# Patient Record
Sex: MALE | Race: WHITE | URBAN - METROPOLITAN AREA
[De-identification: names, ages, dates, MRNs, and addresses within clinical notes are randomized per-mention and may not be internally consistent; named-entity substitution may affect disease eponyms.]

---

## 2017-01-18 ENCOUNTER — TELEPHONE (OUTPATIENT)
Dept: TRANSPLANT | Facility: CLINIC | Age: 57
End: 2017-01-18

## 2017-04-19 ENCOUNTER — TELEPHONE (OUTPATIENT)
Dept: TRANSPLANT | Facility: CLINIC | Age: 57
End: 2017-04-19

## 2017-04-19 NOTE — LETTER
April 19, 2017      Dear Mr. Bailey:      We have made several attempts to get in touch with you but have been unable to reach you to schedule Kidney Evaluation appointments.    If you are still interested and/or have any questions, please contact us at  [phone]   Monday - Friday, between the hours of 7:30-4:00 pm central time.    We look forward to hearing from you.      Regards,     Shirlene Salcedo  KidneyTransplant   [facility]  Solid Organ Transplant  [address] [address]  email: [email or web address]  Office: [phone]  Fax: [phone]

## 2019-09-16 ENCOUNTER — RECORDS - HEALTHEAST (OUTPATIENT)
Dept: SURGERY | Facility: CLINIC | Age: 59
End: 2019-09-16

## 2024-01-01 ENCOUNTER — APPOINTMENT (OUTPATIENT)
Dept: GENERAL RADIOLOGY | Facility: CLINIC | Age: 64
DRG: 003 | End: 2024-01-01
Attending: INTERNAL MEDICINE
Payer: COMMERCIAL

## 2024-01-01 ENCOUNTER — ANCILLARY PROCEDURE (OUTPATIENT)
Dept: CARDIOLOGY | Facility: CLINIC | Age: 64
DRG: 003 | End: 2024-01-01
Attending: STUDENT IN AN ORGANIZED HEALTH CARE EDUCATION/TRAINING PROGRAM
Payer: COMMERCIAL

## 2024-01-01 ENCOUNTER — APPOINTMENT (OUTPATIENT)
Dept: CT IMAGING | Facility: CLINIC | Age: 64
DRG: 003 | End: 2024-01-01
Attending: NURSE PRACTITIONER
Payer: COMMERCIAL

## 2024-01-01 ENCOUNTER — APPOINTMENT (OUTPATIENT)
Dept: NEUROLOGY | Facility: CLINIC | Age: 64
DRG: 003 | End: 2024-01-01
Attending: INTERNAL MEDICINE
Payer: COMMERCIAL

## 2024-01-01 ENCOUNTER — APPOINTMENT (OUTPATIENT)
Dept: CT IMAGING | Facility: CLINIC | Age: 64
DRG: 003 | End: 2024-01-01
Payer: COMMERCIAL

## 2024-01-01 ENCOUNTER — ANESTHESIA (OUTPATIENT)
Dept: CARDIOLOGY | Facility: CLINIC | Age: 64
DRG: 003 | End: 2024-01-01
Payer: COMMERCIAL

## 2024-01-01 ENCOUNTER — APPOINTMENT (OUTPATIENT)
Dept: CT IMAGING | Facility: CLINIC | Age: 64
DRG: 003 | End: 2024-01-01
Attending: STUDENT IN AN ORGANIZED HEALTH CARE EDUCATION/TRAINING PROGRAM
Payer: COMMERCIAL

## 2024-01-01 ENCOUNTER — HOSPITAL ENCOUNTER (INPATIENT)
Facility: CLINIC | Age: 64
LOS: 5 days | DRG: 003 | End: 2024-06-17
Admitting: INTERNAL MEDICINE
Payer: COMMERCIAL

## 2024-01-01 ENCOUNTER — APPOINTMENT (OUTPATIENT)
Dept: ULTRASOUND IMAGING | Facility: CLINIC | Age: 64
DRG: 003 | End: 2024-01-01
Attending: INTERNAL MEDICINE
Payer: COMMERCIAL

## 2024-01-01 ENCOUNTER — APPOINTMENT (OUTPATIENT)
Dept: CARDIOLOGY | Facility: CLINIC | Age: 64
DRG: 003 | End: 2024-01-01
Attending: INTERNAL MEDICINE
Payer: COMMERCIAL

## 2024-01-01 ENCOUNTER — APPOINTMENT (OUTPATIENT)
Dept: ULTRASOUND IMAGING | Facility: CLINIC | Age: 64
DRG: 003 | End: 2024-01-01
Attending: NURSE PRACTITIONER
Payer: COMMERCIAL

## 2024-01-01 ENCOUNTER — ANESTHESIA EVENT (OUTPATIENT)
Dept: CARDIOLOGY | Facility: CLINIC | Age: 64
DRG: 003 | End: 2024-01-01
Payer: COMMERCIAL

## 2024-01-01 VITALS
BODY MASS INDEX: 34.04 KG/M2 | SYSTOLIC BLOOD PRESSURE: 100 MMHG | HEIGHT: 71 IN | RESPIRATION RATE: 10 BRPM | WEIGHT: 243.17 LBS | TEMPERATURE: 98.4 F | OXYGEN SATURATION: 94 % | DIASTOLIC BLOOD PRESSURE: 45 MMHG

## 2024-01-01 DIAGNOSIS — I46.9 CARDIAC ARREST (H): ICD-10-CM

## 2024-01-01 LAB
ABO/RH(D): NORMAL
ACT BLD: 132 SECONDS (ref 74–150)
ACT BLD: 132 SECONDS (ref 74–150)
ACT BLD: 136 SECONDS (ref 74–150)
ACT BLD: 136 SECONDS (ref 74–150)
ACT BLD: 140 SECONDS (ref 74–150)
ACT BLD: 140 SECONDS (ref 74–150)
ACT BLD: 144 SECONDS (ref 74–150)
ACT BLD: 148 SECONDS (ref 74–150)
ACT BLD: 153 SECONDS (ref 74–150)
ACT BLD: 169 SECONDS (ref 74–150)
ACT BLD: 169 SECONDS (ref 74–150)
ACT BLD: 182 SECONDS (ref 74–150)
ACT BLD: 186 SECONDS (ref 74–150)
ACT BLD: 190 SECONDS (ref 74–150)
ACT BLD: 190 SECONDS (ref 74–150)
ACT BLD: 194 SECONDS (ref 74–150)
ACT BLD: 199 SECONDS (ref 74–150)
ACT BLD: 199 SECONDS (ref 74–150)
ACT BLD: 203 SECONDS (ref 74–150)
ACT BLD: 211 SECONDS (ref 74–150)
ACT BLD: 408 SECONDS (ref 74–150)
ACT BLD: 429 SECONDS (ref 74–150)
ACT BLD: >1000 SECONDS (ref 74–150)
ALBUMIN SERPL BCG-MCNC: 1.9 G/DL (ref 3.5–5.2)
ALBUMIN SERPL BCG-MCNC: 2 G/DL (ref 3.5–5.2)
ALBUMIN SERPL BCG-MCNC: 2.1 G/DL (ref 3.5–5.2)
ALBUMIN SERPL BCG-MCNC: 2.2 G/DL (ref 3.5–5.2)
ALLEN'S TEST: ABNORMAL
ALP SERPL-CCNC: 52 U/L (ref 40–150)
ALP SERPL-CCNC: 55 U/L (ref 40–150)
ALP SERPL-CCNC: 56 U/L (ref 40–150)
ALP SERPL-CCNC: 60 U/L (ref 40–150)
ALP SERPL-CCNC: 62 U/L (ref 40–150)
ALP SERPL-CCNC: 62 U/L (ref 40–150)
ALP SERPL-CCNC: 63 U/L (ref 40–150)
ALP SERPL-CCNC: 64 U/L (ref 40–150)
ALP SERPL-CCNC: 65 U/L (ref 40–150)
ALP SERPL-CCNC: 67 U/L (ref 40–150)
ALP SERPL-CCNC: 67 U/L (ref 40–150)
ALP SERPL-CCNC: 68 U/L (ref 40–150)
ALP SERPL-CCNC: 73 U/L (ref 40–150)
ALP SERPL-CCNC: 77 U/L (ref 40–150)
ALP SERPL-CCNC: 86 U/L (ref 40–150)
ALP SERPL-CCNC: 95 U/L (ref 40–150)
ALT SERPL W P-5'-P-CCNC: 16 U/L (ref 0–70)
ALT SERPL W P-5'-P-CCNC: 17 U/L (ref 0–70)
ALT SERPL W P-5'-P-CCNC: 18 U/L (ref 0–70)
ALT SERPL W P-5'-P-CCNC: 19 U/L (ref 0–70)
ALT SERPL W P-5'-P-CCNC: 21 U/L (ref 0–70)
ALT SERPL W P-5'-P-CCNC: 22 U/L (ref 0–70)
ALT SERPL W P-5'-P-CCNC: 25 U/L (ref 0–70)
ALT SERPL W P-5'-P-CCNC: 27 U/L (ref 0–70)
ALT SERPL W P-5'-P-CCNC: 30 U/L (ref 0–70)
ALT SERPL W P-5'-P-CCNC: 34 U/L (ref 0–70)
ALT SERPL W P-5'-P-CCNC: 35 U/L (ref 0–70)
ALT SERPL W P-5'-P-CCNC: 40 U/L (ref 0–70)
ALT SERPL W P-5'-P-CCNC: 40 U/L (ref 0–70)
ALT SERPL W P-5'-P-CCNC: 44 U/L (ref 0–70)
ALT SERPL W P-5'-P-CCNC: 46 U/L (ref 0–70)
ALT SERPL W P-5'-P-CCNC: 48 U/L (ref 0–70)
ALT SERPL W P-5'-P-CCNC: 50 U/L (ref 0–70)
ALT SERPL W P-5'-P-CCNC: 51 U/L (ref 0–70)
ALT SERPL W P-5'-P-CCNC: 52 U/L (ref 0–70)
ANION GAP SERPL CALCULATED.3IONS-SCNC: 10 MMOL/L (ref 7–15)
ANION GAP SERPL CALCULATED.3IONS-SCNC: 11 MMOL/L (ref 7–15)
ANION GAP SERPL CALCULATED.3IONS-SCNC: 12 MMOL/L (ref 7–15)
ANION GAP SERPL CALCULATED.3IONS-SCNC: 13 MMOL/L (ref 7–15)
ANION GAP SERPL CALCULATED.3IONS-SCNC: 16 MMOL/L (ref 7–15)
ANION GAP SERPL CALCULATED.3IONS-SCNC: 19 MMOL/L (ref 7–15)
ANION GAP SERPL CALCULATED.3IONS-SCNC: 20 MMOL/L (ref 7–15)
ANION GAP SERPL CALCULATED.3IONS-SCNC: 20 MMOL/L (ref 7–15)
ANION GAP SERPL CALCULATED.3IONS-SCNC: 21 MMOL/L (ref 7–15)
ANION GAP SERPL CALCULATED.3IONS-SCNC: 21 MMOL/L (ref 7–15)
ANION GAP SERPL CALCULATED.3IONS-SCNC: 7 MMOL/L (ref 7–15)
ANION GAP SERPL CALCULATED.3IONS-SCNC: 8 MMOL/L (ref 7–15)
ANION GAP SERPL CALCULATED.3IONS-SCNC: 9 MMOL/L (ref 7–15)
ANTIBODY SCREEN: NEGATIVE
APTT PPP: 41 SECONDS (ref 22–38)
APTT PPP: 42 SECONDS (ref 22–38)
APTT PPP: 43 SECONDS (ref 22–38)
APTT PPP: 44 SECONDS (ref 22–38)
APTT PPP: 44 SECONDS (ref 22–38)
APTT PPP: 45 SECONDS (ref 22–38)
APTT PPP: 46 SECONDS (ref 22–38)
APTT PPP: 47 SECONDS (ref 22–38)
APTT PPP: 48 SECONDS (ref 22–38)
APTT PPP: 49 SECONDS (ref 22–38)
APTT PPP: 49 SECONDS (ref 22–38)
APTT PPP: 52 SECONDS (ref 22–38)
APTT PPP: 55 SECONDS (ref 22–38)
APTT PPP: 58 SECONDS (ref 22–38)
APTT PPP: 60 SECONDS (ref 22–38)
AST SERPL W P-5'-P-CCNC: 110 U/L (ref 0–45)
AST SERPL W P-5'-P-CCNC: 124 U/L (ref 0–45)
AST SERPL W P-5'-P-CCNC: 130 U/L (ref 0–45)
AST SERPL W P-5'-P-CCNC: 136 U/L (ref 0–45)
AST SERPL W P-5'-P-CCNC: 137 U/L (ref 0–45)
AST SERPL W P-5'-P-CCNC: 156 U/L (ref 0–45)
AST SERPL W P-5'-P-CCNC: 166 U/L (ref 0–45)
AST SERPL W P-5'-P-CCNC: 180 U/L (ref 0–45)
AST SERPL W P-5'-P-CCNC: 187 U/L (ref 0–45)
AST SERPL W P-5'-P-CCNC: 211 U/L (ref 0–45)
AST SERPL W P-5'-P-CCNC: 229 U/L (ref 0–45)
AST SERPL W P-5'-P-CCNC: 244 U/L (ref 0–45)
AST SERPL W P-5'-P-CCNC: 260 U/L (ref 0–45)
AST SERPL W P-5'-P-CCNC: 289 U/L (ref 0–45)
AST SERPL W P-5'-P-CCNC: 350 U/L (ref 0–45)
AST SERPL W P-5'-P-CCNC: 389 U/L (ref 0–45)
AST SERPL W P-5'-P-CCNC: 418 U/L (ref 0–45)
AST SERPL W P-5'-P-CCNC: 437 U/L (ref 0–45)
AST SERPL W P-5'-P-CCNC: 498 U/L (ref 0–45)
AST SERPL W P-5'-P-CCNC: 511 U/L (ref 0–45)
AST SERPL W P-5'-P-CCNC: 512 U/L (ref 0–45)
AT III ACT/NOR PPP CHRO: 37 % (ref 85–135)
AT III ACT/NOR PPP CHRO: 38 % (ref 85–135)
AT III ACT/NOR PPP CHRO: 41 % (ref 85–135)
AT III ACT/NOR PPP CHRO: 41 % (ref 85–135)
AT III ACT/NOR PPP CHRO: 42 % (ref 85–135)
ATRIAL RATE - MUSE: 68 BPM
ATRIAL RATE - MUSE: 74 BPM
ATRIAL RATE - MUSE: 80 BPM
ATRIAL RATE - MUSE: 81 BPM
ATRIAL RATE - MUSE: 88 BPM
ATRIAL RATE - MUSE: NORMAL BPM
BACTERIA SPT CULT: NO GROWTH
BACTERIA SPT CULT: NORMAL
BASE EXCESS BLDA CALC-SCNC: -0.1 MMOL/L (ref -3–3)
BASE EXCESS BLDA CALC-SCNC: -0.2 MMOL/L (ref -3–3)
BASE EXCESS BLDA CALC-SCNC: -0.3 MMOL/L (ref -3–3)
BASE EXCESS BLDA CALC-SCNC: -0.4 MMOL/L (ref -3–3)
BASE EXCESS BLDA CALC-SCNC: -0.4 MMOL/L (ref -3–3)
BASE EXCESS BLDA CALC-SCNC: -0.5 MMOL/L (ref -3–3)
BASE EXCESS BLDA CALC-SCNC: -0.6 MMOL/L (ref -3–3)
BASE EXCESS BLDA CALC-SCNC: -0.9 MMOL/L (ref -3–3)
BASE EXCESS BLDA CALC-SCNC: -1.4 MMOL/L (ref -3–3)
BASE EXCESS BLDA CALC-SCNC: -1.5 MMOL/L (ref -3–3)
BASE EXCESS BLDA CALC-SCNC: -1.6 MMOL/L (ref -3–3)
BASE EXCESS BLDA CALC-SCNC: -1.7 MMOL/L (ref -3–3)
BASE EXCESS BLDA CALC-SCNC: -1.7 MMOL/L (ref -3–3)
BASE EXCESS BLDA CALC-SCNC: -1.8 MMOL/L (ref -3–3)
BASE EXCESS BLDA CALC-SCNC: -1.9 MMOL/L (ref -3–3)
BASE EXCESS BLDA CALC-SCNC: -1.9 MMOL/L (ref -3–3)
BASE EXCESS BLDA CALC-SCNC: -10 MMOL/L (ref -3–3)
BASE EXCESS BLDA CALC-SCNC: -10.1 MMOL/L (ref -3–3)
BASE EXCESS BLDA CALC-SCNC: -10.3 MMOL/L (ref -3–3)
BASE EXCESS BLDA CALC-SCNC: -10.4 MMOL/L (ref -3–3)
BASE EXCESS BLDA CALC-SCNC: -10.4 MMOL/L (ref -3–3)
BASE EXCESS BLDA CALC-SCNC: -10.6 MMOL/L (ref -3–3)
BASE EXCESS BLDA CALC-SCNC: -10.8 MMOL/L (ref -3–3)
BASE EXCESS BLDA CALC-SCNC: -10.8 MMOL/L (ref -3–3)
BASE EXCESS BLDA CALC-SCNC: -11.2 MMOL/L (ref -3–3)
BASE EXCESS BLDA CALC-SCNC: -11.5 MMOL/L (ref -3–3)
BASE EXCESS BLDA CALC-SCNC: -12.8 MMOL/L (ref -3–3)
BASE EXCESS BLDA CALC-SCNC: -19.2 MMOL/L (ref -3–3)
BASE EXCESS BLDA CALC-SCNC: -2.1 MMOL/L (ref -3–3)
BASE EXCESS BLDA CALC-SCNC: -2.3 MMOL/L (ref -3–3)
BASE EXCESS BLDA CALC-SCNC: -2.3 MMOL/L (ref -3–3)
BASE EXCESS BLDA CALC-SCNC: -2.5 MMOL/L (ref -3–3)
BASE EXCESS BLDA CALC-SCNC: -2.6 MMOL/L (ref -3–3)
BASE EXCESS BLDA CALC-SCNC: -2.6 MMOL/L (ref -3–3)
BASE EXCESS BLDA CALC-SCNC: -3.2 MMOL/L (ref -3–3)
BASE EXCESS BLDA CALC-SCNC: -3.3 MMOL/L (ref -3–3)
BASE EXCESS BLDA CALC-SCNC: -3.3 MMOL/L (ref -3–3)
BASE EXCESS BLDA CALC-SCNC: -4 MMOL/L (ref -3–3)
BASE EXCESS BLDA CALC-SCNC: -5 MMOL/L (ref -3–3)
BASE EXCESS BLDA CALC-SCNC: -5.9 MMOL/L (ref -3–3)
BASE EXCESS BLDA CALC-SCNC: -6.4 MMOL/L (ref -3–3)
BASE EXCESS BLDA CALC-SCNC: -6.6 MMOL/L (ref -3–3)
BASE EXCESS BLDA CALC-SCNC: -6.6 MMOL/L (ref -3–3)
BASE EXCESS BLDA CALC-SCNC: -6.7 MMOL/L (ref -3–3)
BASE EXCESS BLDA CALC-SCNC: -7.2 MMOL/L (ref -3–3)
BASE EXCESS BLDA CALC-SCNC: -8.1 MMOL/L (ref -3–3)
BASE EXCESS BLDA CALC-SCNC: -9.2 MMOL/L (ref -3–3)
BASE EXCESS BLDA CALC-SCNC: -9.2 MMOL/L (ref -3–3)
BASE EXCESS BLDA CALC-SCNC: 0 MMOL/L (ref -3–3)
BASE EXCESS BLDA CALC-SCNC: 0 MMOL/L (ref -3–3)
BASE EXCESS BLDA CALC-SCNC: 0.1 MMOL/L (ref -3–3)
BASE EXCESS BLDA CALC-SCNC: 0.2 MMOL/L (ref -3–3)
BASE EXCESS BLDA CALC-SCNC: 0.3 MMOL/L (ref -3–3)
BASE EXCESS BLDA CALC-SCNC: 0.4 MMOL/L (ref -3–3)
BASE EXCESS BLDA CALC-SCNC: 0.4 MMOL/L (ref -3–3)
BASE EXCESS BLDA CALC-SCNC: 0.5 MMOL/L (ref -3–3)
BASE EXCESS BLDA CALC-SCNC: 0.5 MMOL/L (ref -3–3)
BASE EXCESS BLDA CALC-SCNC: 0.6 MMOL/L (ref -3–3)
BASE EXCESS BLDA CALC-SCNC: 0.8 MMOL/L (ref -3–3)
BASE EXCESS BLDA CALC-SCNC: 0.8 MMOL/L (ref -3–3)
BASE EXCESS BLDA CALC-SCNC: 1.1 MMOL/L (ref -3–3)
BASE EXCESS BLDA CALC-SCNC: 1.1 MMOL/L (ref -3–3)
BASE EXCESS BLDA CALC-SCNC: 1.3 MMOL/L (ref -3–3)
BASE EXCESS BLDV CALC-SCNC: -1 MMOL/L (ref -3–3)
BASE EXCESS BLDV CALC-SCNC: -10.4 MMOL/L (ref -3–3)
BASE EXCESS BLDV CALC-SCNC: -10.8 MMOL/L (ref -3–3)
BASE EXCESS BLDV CALC-SCNC: -2.6 MMOL/L (ref -3–3)
BASE EXCESS BLDV CALC-SCNC: -2.7 MMOL/L (ref -3–3)
BASE EXCESS BLDV CALC-SCNC: -5.8 MMOL/L (ref -3–3)
BASE EXCESS BLDV CALC-SCNC: -9.1 MMOL/L (ref -3–3)
BASE EXCESS BLDV CALC-SCNC: 0.6 MMOL/L (ref -3–3)
BASE EXCESS BLDV CALC-SCNC: 0.8 MMOL/L (ref -3–3)
BASE EXCESS BLDV CALC-SCNC: 1.3 MMOL/L (ref -3–3)
BASE EXCESS BLDV CALC-SCNC: 1.5 MMOL/L (ref -3–3)
BASOPHILS # BLD AUTO: 0.1 10E3/UL (ref 0–0.2)
BASOPHILS NFR BLD AUTO: 0 %
BILIRUB DIRECT SERPL-MCNC: 1.05 MG/DL (ref 0–0.3)
BILIRUB DIRECT SERPL-MCNC: 1.41 MG/DL (ref 0–0.3)
BILIRUB SERPL-MCNC: 0.6 MG/DL
BILIRUB SERPL-MCNC: 0.7 MG/DL
BILIRUB SERPL-MCNC: 0.8 MG/DL
BILIRUB SERPL-MCNC: 1 MG/DL
BILIRUB SERPL-MCNC: 1.1 MG/DL
BILIRUB SERPL-MCNC: 1.2 MG/DL
BILIRUB SERPL-MCNC: 1.4 MG/DL
BILIRUB SERPL-MCNC: 1.5 MG/DL
BILIRUB SERPL-MCNC: 1.5 MG/DL
BILIRUB SERPL-MCNC: 1.6 MG/DL
BILIRUB SERPL-MCNC: 1.8 MG/DL
BILIRUB SERPL-MCNC: 1.8 MG/DL
BILIRUB SERPL-MCNC: 2 MG/DL
BILIRUB SERPL-MCNC: 2.4 MG/DL
BILIRUB SERPL-MCNC: 2.5 MG/DL
BLD PROD TYP BPU: NORMAL
BLOOD COMPONENT TYPE: NORMAL
BUN SERPL-MCNC: 11.4 MG/DL (ref 8–23)
BUN SERPL-MCNC: 13.3 MG/DL (ref 8–23)
BUN SERPL-MCNC: 14.1 MG/DL (ref 8–23)
BUN SERPL-MCNC: 15.2 MG/DL (ref 8–23)
BUN SERPL-MCNC: 15.4 MG/DL (ref 8–23)
BUN SERPL-MCNC: 15.6 MG/DL (ref 8–23)
BUN SERPL-MCNC: 15.9 MG/DL (ref 8–23)
BUN SERPL-MCNC: 17.9 MG/DL (ref 8–23)
BUN SERPL-MCNC: 17.9 MG/DL (ref 8–23)
BUN SERPL-MCNC: 19.7 MG/DL (ref 8–23)
BUN SERPL-MCNC: 19.7 MG/DL (ref 8–23)
BUN SERPL-MCNC: 21.4 MG/DL (ref 8–23)
BUN SERPL-MCNC: 22.4 MG/DL (ref 8–23)
BUN SERPL-MCNC: 23.4 MG/DL (ref 8–23)
BUN SERPL-MCNC: 24.3 MG/DL (ref 8–23)
BUN SERPL-MCNC: 24.6 MG/DL (ref 8–23)
BUN SERPL-MCNC: 27.7 MG/DL (ref 8–23)
BUN SERPL-MCNC: 28.2 MG/DL (ref 8–23)
BUN SERPL-MCNC: 30.6 MG/DL (ref 8–23)
BUN SERPL-MCNC: 31.4 MG/DL (ref 8–23)
BUN SERPL-MCNC: 31.4 MG/DL (ref 8–23)
BUN SERPL-MCNC: 32.6 MG/DL (ref 8–23)
BUN SERPL-MCNC: 35.3 MG/DL (ref 8–23)
BUN SERPL-MCNC: 37.4 MG/DL (ref 8–23)
BUN SERPL-MCNC: 39.2 MG/DL (ref 8–23)
BUN SERPL-MCNC: 9.8 MG/DL (ref 8–23)
CA-I BLD-MCNC: 4.3 MG/DL (ref 4.4–5.2)
CA-I BLD-MCNC: 4.3 MG/DL (ref 4.4–5.2)
CA-I BLD-MCNC: 4.7 MG/DL (ref 4.4–5.2)
CA-I BLD-MCNC: 4.7 MG/DL (ref 4.4–5.2)
CA-I BLD-MCNC: 4.9 MG/DL (ref 4.4–5.2)
CA-I BLD-MCNC: 5 MG/DL (ref 4.4–5.2)
CA-I BLD-MCNC: 5.1 MG/DL (ref 4.4–5.2)
CA-I BLD-MCNC: 5.2 MG/DL (ref 4.4–5.2)
CA-I BLD-MCNC: 5.3 MG/DL (ref 4.4–5.2)
CA-I BLD-MCNC: 5.3 MG/DL (ref 4.4–5.2)
CA-I BLD-MCNC: 5.4 MG/DL (ref 4.4–5.2)
CALCIUM SERPL-MCNC: 7.6 MG/DL (ref 8.2–9.6)
CALCIUM SERPL-MCNC: 7.8 MG/DL (ref 8.2–9.6)
CALCIUM SERPL-MCNC: 8.3 MG/DL (ref 8.2–9.6)
CALCIUM SERPL-MCNC: 8.4 MG/DL (ref 8.8–10.2)
CALCIUM SERPL-MCNC: 8.4 MG/DL (ref 8.8–10.2)
CALCIUM SERPL-MCNC: 8.5 MG/DL (ref 8.8–10.2)
CALCIUM SERPL-MCNC: 8.6 MG/DL (ref 8.8–10.2)
CALCIUM SERPL-MCNC: 8.7 MG/DL (ref 8.8–10.2)
CALCIUM SERPL-MCNC: 8.8 MG/DL (ref 8.8–10.2)
CALCIUM SERPL-MCNC: 8.9 MG/DL (ref 8.8–10.2)
CALCIUM SERPL-MCNC: 9 MG/DL (ref 8.8–10.2)
CF REDUC 30M P MA P HEP LENFR BLD TEG: 0 % (ref 0–8)
CF REDUC 30M P MA P HEP LENFR BLD TEG: 0 % (ref 0–8)
CF REDUC 30M P MA P HEP LENFR BLD TEG: 0.1 % (ref 0–8)
CF REDUC 60M P MA P HEPASE LENFR BLD TEG: 0 % (ref 0–15)
CF REDUC 60M P MA P HEPASE LENFR BLD TEG: 0 % (ref 0–15)
CF REDUC 60M P MA P HEPASE LENFR BLD TEG: 2 % (ref 0–15)
CFT P HPASE BLD TEG: 1 MINUTE (ref 1–3)
CFT P HPASE BLD TEG: 1.8 MINUTE (ref 1–3)
CFT P HPASE BLD TEG: 3.2 MINUTE (ref 1–3)
CHLORIDE SERPL-SCNC: 105 MMOL/L (ref 98–107)
CHLORIDE SERPL-SCNC: 106 MMOL/L (ref 98–107)
CHLORIDE SERPL-SCNC: 107 MMOL/L (ref 98–107)
CHLORIDE SERPL-SCNC: 108 MMOL/L (ref 98–107)
CHLORIDE SERPL-SCNC: 109 MMOL/L (ref 98–107)
CHLORIDE SERPL-SCNC: 109 MMOL/L (ref 98–107)
CHLORIDE SERPL-SCNC: 111 MMOL/L (ref 98–107)
CI (COAGULATION INDEX)(Z): -3.3 (ref -3–3)
CI (COAGULATION INDEX)(Z): -8.7 (ref -3–3)
CI (COAGULATION INDEX)(Z): 0.8 (ref -3–3)
CK SERPL-CCNC: 101 U/L (ref 39–308)
CK SERPL-CCNC: 1089 U/L (ref 39–308)
CK SERPL-CCNC: 121 U/L (ref 39–308)
CK SERPL-CCNC: 122 U/L (ref 39–308)
CK SERPL-CCNC: 147 U/L (ref 39–308)
CK SERPL-CCNC: 1489 U/L (ref 39–308)
CK SERPL-CCNC: 162 U/L (ref 39–308)
CK SERPL-CCNC: 1669 U/L (ref 39–308)
CK SERPL-CCNC: 176 U/L (ref 39–308)
CK SERPL-CCNC: 1785 U/L (ref 39–308)
CK SERPL-CCNC: 206 U/L (ref 39–308)
CK SERPL-CCNC: 251 U/L (ref 39–308)
CK SERPL-CCNC: 294 U/L (ref 39–308)
CK SERPL-CCNC: 333 U/L (ref 39–308)
CK SERPL-CCNC: 381 U/L (ref 39–308)
CK SERPL-CCNC: 439 U/L (ref 39–308)
CK SERPL-CCNC: 541 U/L (ref 39–308)
CK SERPL-CCNC: 659 U/L (ref 39–308)
CK SERPL-CCNC: 865 U/L (ref 39–308)
CLOT ANGLE P HPASE BLD TEG: 45.1 DEGREES (ref 53–72)
CLOT ANGLE P HPASE BLD TEG: 60.1 DEGREES (ref 53–72)
CLOT ANGLE P HPASE BLD TEG: 75.3 DEGREES (ref 53–72)
CLOT INIT P HPASE BLD TEG: 14.1 MINUTE (ref 5–10)
CLOT INIT P HPASE BLD TEG: 6.4 MINUTE (ref 5–10)
CLOT INIT P HPASE BLD TEG: 9.2 MINUTE (ref 5–10)
CLOT STRENGTH P HPASE BLD TEG: 4.9 KD/SC (ref 4.5–11)
CLOT STRENGTH P HPASE BLD TEG: 5.8 KD/SC (ref 4.5–11)
CLOT STRENGTH P HPASE BLD TEG: 7.7 KD/SC (ref 4.5–11)
CODING SYSTEM: NORMAL
COHGB MFR BLD: 100 % (ref 95–96)
COHGB MFR BLD: 96.1 % (ref 95–96)
COHGB MFR BLD: 97 % (ref 95–96)
COHGB MFR BLD: 97.3 % (ref 95–96)
COHGB MFR BLD: 97.6 % (ref 95–96)
COHGB MFR BLD: 98.5 % (ref 95–96)
COHGB MFR BLD: 99.2 % (ref 95–96)
COHGB MFR BLD: 99.3 % (ref 95–96)
COHGB MFR BLD: 99.6 % (ref 95–96)
COHGB MFR BLD: 99.8 % (ref 95–96)
COHGB MFR BLD: 99.9 % (ref 95–96)
COHGB MFR BLD: >100 % (ref 95–96)
CORTIS SERPL-MCNC: 13.6 UG/DL
CREAT SERPL-MCNC: 1.89 MG/DL (ref 0.67–1.17)
CREAT SERPL-MCNC: 2 MG/DL (ref 0.67–1.17)
CREAT SERPL-MCNC: 2.03 MG/DL (ref 0.67–1.17)
CREAT SERPL-MCNC: 2.04 MG/DL (ref 0.67–1.17)
CREAT SERPL-MCNC: 2.25 MG/DL (ref 0.67–1.17)
CREAT SERPL-MCNC: 2.51 MG/DL (ref 0.67–1.17)
CREAT SERPL-MCNC: 2.51 MG/DL (ref 0.67–1.17)
CREAT SERPL-MCNC: 2.85 MG/DL (ref 0.67–1.17)
CREAT SERPL-MCNC: 2.9 MG/DL (ref 0.67–1.17)
CREAT SERPL-MCNC: 2.97 MG/DL (ref 0.67–1.17)
CREAT SERPL-MCNC: 3.11 MG/DL (ref 0.67–1.17)
CREAT SERPL-MCNC: 3.2 MG/DL (ref 0.67–1.17)
CREAT SERPL-MCNC: 3.39 MG/DL (ref 0.67–1.17)
CREAT SERPL-MCNC: 3.64 MG/DL (ref 0.67–1.17)
CREAT SERPL-MCNC: 3.75 MG/DL (ref 0.67–1.17)
CREAT SERPL-MCNC: 3.99 MG/DL (ref 0.67–1.17)
CREAT SERPL-MCNC: 4.16 MG/DL (ref 0.67–1.17)
CREAT SERPL-MCNC: 4.16 MG/DL (ref 0.67–1.17)
CREAT SERPL-MCNC: 4.23 MG/DL (ref 0.67–1.17)
CREAT SERPL-MCNC: 4.54 MG/DL (ref 0.67–1.17)
CREAT SERPL-MCNC: 4.77 MG/DL (ref 0.67–1.17)
CREAT SERPL-MCNC: 4.99 MG/DL (ref 0.67–1.17)
CREAT SERPL-MCNC: 5.05 MG/DL (ref 0.67–1.17)
CREAT SERPL-MCNC: 5.1 MG/DL (ref 0.67–1.17)
CREAT SERPL-MCNC: 5.26 MG/DL (ref 0.67–1.17)
CREAT SERPL-MCNC: 5.46 MG/DL (ref 0.67–1.17)
CROSSMATCH: NORMAL
CRP SERPL-MCNC: 12.8 MG/L
CRP SERPL-MCNC: 4.07 MG/L
CRP SERPL-MCNC: 63.2 MG/L
CRP SERPL-MCNC: 68.1 MG/L
CRP SERPL-MCNC: 76.1 MG/L
CRP SERPL-MCNC: 96.8 MG/L
D DIMER PPP FEU-MCNC: 12.74 UG/ML FEU (ref 0–0.5)
D DIMER PPP FEU-MCNC: 2.53 UG/ML FEU (ref 0–0.5)
D DIMER PPP FEU-MCNC: 2.58 UG/ML FEU (ref 0–0.5)
D DIMER PPP FEU-MCNC: 2.69 UG/ML FEU (ref 0–0.5)
D DIMER PPP FEU-MCNC: 2.73 UG/ML FEU (ref 0–0.5)
D DIMER PPP FEU-MCNC: 2.79 UG/ML FEU (ref 0–0.5)
D DIMER PPP FEU-MCNC: 2.99 UG/ML FEU (ref 0–0.5)
D DIMER PPP FEU-MCNC: 3.77 UG/ML FEU (ref 0–0.5)
D DIMER PPP FEU-MCNC: 5 UG/ML FEU (ref 0–0.5)
D DIMER PPP FEU-MCNC: 9.79 UG/ML FEU (ref 0–0.5)
D DIMER PPP FEU-MCNC: <0.27 UG/ML FEU (ref 0–0.5)
DEPRECATED HCO3 PLAS-SCNC: 13 MMOL/L (ref 22–29)
DEPRECATED HCO3 PLAS-SCNC: 13 MMOL/L (ref 22–29)
DEPRECATED HCO3 PLAS-SCNC: 14 MMOL/L (ref 22–29)
DEPRECATED HCO3 PLAS-SCNC: 14 MMOL/L (ref 22–29)
DEPRECATED HCO3 PLAS-SCNC: 16 MMOL/L (ref 22–29)
DEPRECATED HCO3 PLAS-SCNC: 17 MMOL/L (ref 22–29)
DEPRECATED HCO3 PLAS-SCNC: 20 MMOL/L (ref 22–29)
DEPRECATED HCO3 PLAS-SCNC: 21 MMOL/L (ref 22–29)
DEPRECATED HCO3 PLAS-SCNC: 22 MMOL/L (ref 22–29)
DEPRECATED HCO3 PLAS-SCNC: 23 MMOL/L (ref 22–29)
DIASTOLIC BLOOD PRESSURE - MUSE: NORMAL MMHG
EGFRCR SERPLBLD CKD-EPI 2021: 11 ML/MIN/1.73M2
EGFRCR SERPLBLD CKD-EPI 2021: 11 ML/MIN/1.73M2
EGFRCR SERPLBLD CKD-EPI 2021: 12 ML/MIN/1.73M2
EGFRCR SERPLBLD CKD-EPI 2021: 13 ML/MIN/1.73M2
EGFRCR SERPLBLD CKD-EPI 2021: 13 ML/MIN/1.73M2
EGFRCR SERPLBLD CKD-EPI 2021: 14 ML/MIN/1.73M2
EGFRCR SERPLBLD CKD-EPI 2021: 14 ML/MIN/1.73M2
EGFRCR SERPLBLD CKD-EPI 2021: 15 ML/MIN/1.73M2
EGFRCR SERPLBLD CKD-EPI 2021: 16 ML/MIN/1.73M2
EGFRCR SERPLBLD CKD-EPI 2021: 16 ML/MIN/1.73M2
EGFRCR SERPLBLD CKD-EPI 2021: 17 ML/MIN/1.73M2
EGFRCR SERPLBLD CKD-EPI 2021: 18 ML/MIN/1.73M2
EGFRCR SERPLBLD CKD-EPI 2021: 21 ML/MIN/1.73M2
EGFRCR SERPLBLD CKD-EPI 2021: 22 ML/MIN/1.73M2
EGFRCR SERPLBLD CKD-EPI 2021: 24 ML/MIN/1.73M2
EGFRCR SERPLBLD CKD-EPI 2021: 28 ML/MIN/1.73M2
EGFRCR SERPLBLD CKD-EPI 2021: 28 ML/MIN/1.73M2
EGFRCR SERPLBLD CKD-EPI 2021: 31 ML/MIN/1.73M2
EGFRCR SERPLBLD CKD-EPI 2021: 35 ML/MIN/1.73M2
EGFRCR SERPLBLD CKD-EPI 2021: 35 ML/MIN/1.73M2
EGFRCR SERPLBLD CKD-EPI 2021: 36 ML/MIN/1.73M2
EGFRCR SERPLBLD CKD-EPI 2021: 39 ML/MIN/1.73M2
EOSINOPHIL # BLD AUTO: 0 10E3/UL (ref 0–0.7)
EOSINOPHIL NFR BLD AUTO: 0 %
ERYTHROCYTE [DISTWIDTH] IN BLOOD BY AUTOMATED COUNT: 16.7 % (ref 10–15)
ERYTHROCYTE [DISTWIDTH] IN BLOOD BY AUTOMATED COUNT: 17.1 % (ref 10–15)
ERYTHROCYTE [DISTWIDTH] IN BLOOD BY AUTOMATED COUNT: 17.3 % (ref 10–15)
ERYTHROCYTE [DISTWIDTH] IN BLOOD BY AUTOMATED COUNT: 17.3 % (ref 10–15)
ERYTHROCYTE [DISTWIDTH] IN BLOOD BY AUTOMATED COUNT: 17.7 % (ref 10–15)
ERYTHROCYTE [DISTWIDTH] IN BLOOD BY AUTOMATED COUNT: 17.8 % (ref 10–15)
ERYTHROCYTE [DISTWIDTH] IN BLOOD BY AUTOMATED COUNT: 17.8 % (ref 10–15)
ERYTHROCYTE [DISTWIDTH] IN BLOOD BY AUTOMATED COUNT: 17.9 % (ref 10–15)
ERYTHROCYTE [DISTWIDTH] IN BLOOD BY AUTOMATED COUNT: 17.9 % (ref 10–15)
ERYTHROCYTE [DISTWIDTH] IN BLOOD BY AUTOMATED COUNT: 18 % (ref 10–15)
ERYTHROCYTE [DISTWIDTH] IN BLOOD BY AUTOMATED COUNT: 18 % (ref 10–15)
ERYTHROCYTE [DISTWIDTH] IN BLOOD BY AUTOMATED COUNT: 18.1 % (ref 10–15)
ERYTHROCYTE [DISTWIDTH] IN BLOOD BY AUTOMATED COUNT: 18.2 % (ref 10–15)
ERYTHROCYTE [DISTWIDTH] IN BLOOD BY AUTOMATED COUNT: 18.2 % (ref 10–15)
ERYTHROCYTE [DISTWIDTH] IN BLOOD BY AUTOMATED COUNT: 18.3 % (ref 10–15)
ERYTHROCYTE [DISTWIDTH] IN BLOOD BY AUTOMATED COUNT: 18.3 % (ref 10–15)
ERYTHROCYTE [DISTWIDTH] IN BLOOD BY AUTOMATED COUNT: 18.6 % (ref 10–15)
ERYTHROCYTE [DISTWIDTH] IN BLOOD BY AUTOMATED COUNT: 18.7 % (ref 10–15)
ERYTHROCYTE [DISTWIDTH] IN BLOOD BY AUTOMATED COUNT: 18.9 % (ref 10–15)
ERYTHROCYTE [DISTWIDTH] IN BLOOD BY AUTOMATED COUNT: 19 % (ref 10–15)
ERYTHROCYTE [SEDIMENTATION RATE] IN BLOOD BY WESTERGREN METHOD: 16 MM/HR (ref 0–20)
ERYTHROCYTE [SEDIMENTATION RATE] IN BLOOD BY WESTERGREN METHOD: 17 MM/HR (ref 0–20)
ERYTHROCYTE [SEDIMENTATION RATE] IN BLOOD BY WESTERGREN METHOD: 21 MM/HR (ref 0–20)
ERYTHROCYTE [SEDIMENTATION RATE] IN BLOOD BY WESTERGREN METHOD: 22 MM/HR (ref 0–20)
ERYTHROCYTE [SEDIMENTATION RATE] IN BLOOD BY WESTERGREN METHOD: 22 MM/HR (ref 0–30)
ERYTHROCYTE [SEDIMENTATION RATE] IN BLOOD BY WESTERGREN METHOD: 25 MM/HR (ref 0–20)
FIBRINOGEN PPP-MCNC: 212 MG/DL (ref 170–490)
FIBRINOGEN PPP-MCNC: 220 MG/DL (ref 170–490)
FIBRINOGEN PPP-MCNC: 241 MG/DL (ref 170–490)
FIBRINOGEN PPP-MCNC: 242 MG/DL (ref 170–490)
FIBRINOGEN PPP-MCNC: 244 MG/DL (ref 170–490)
FIBRINOGEN PPP-MCNC: 249 MG/DL (ref 170–490)
FIBRINOGEN PPP-MCNC: 251 MG/DL (ref 170–490)
FIBRINOGEN PPP-MCNC: 253 MG/DL (ref 170–490)
FIBRINOGEN PPP-MCNC: 260 MG/DL (ref 170–490)
FIBRINOGEN PPP-MCNC: 263 MG/DL (ref 170–490)
GABAPENTIN SERPLBLD QL SCN: POSITIVE
GABAPENTIN SERPLBLD-MCNC: 5.2 UG/ML
GLUCOSE BLD-MCNC: 116 MG/DL (ref 70–99)
GLUCOSE BLD-MCNC: 118 MG/DL (ref 70–99)
GLUCOSE BLD-MCNC: 119 MG/DL (ref 70–99)
GLUCOSE BLD-MCNC: 121 MG/DL (ref 70–99)
GLUCOSE BLD-MCNC: 121 MG/DL (ref 70–99)
GLUCOSE BLD-MCNC: 125 MG/DL (ref 70–99)
GLUCOSE BLD-MCNC: 126 MG/DL (ref 70–99)
GLUCOSE BLD-MCNC: 126 MG/DL (ref 70–99)
GLUCOSE BLD-MCNC: 135 MG/DL (ref 70–99)
GLUCOSE BLD-MCNC: 136 MG/DL (ref 70–99)
GLUCOSE BLD-MCNC: 137 MG/DL (ref 70–99)
GLUCOSE BLD-MCNC: 140 MG/DL (ref 70–99)
GLUCOSE BLD-MCNC: 144 MG/DL (ref 70–99)
GLUCOSE BLD-MCNC: 145 MG/DL (ref 70–99)
GLUCOSE BLD-MCNC: 146 MG/DL (ref 70–99)
GLUCOSE BLD-MCNC: 150 MG/DL (ref 70–99)
GLUCOSE BLD-MCNC: 150 MG/DL (ref 70–99)
GLUCOSE BLD-MCNC: 154 MG/DL (ref 70–99)
GLUCOSE BLD-MCNC: 157 MG/DL (ref 70–99)
GLUCOSE BLD-MCNC: 161 MG/DL (ref 70–99)
GLUCOSE BLD-MCNC: 91 MG/DL (ref 70–99)
GLUCOSE BLD-MCNC: 98 MG/DL (ref 70–99)
GLUCOSE BLDC GLUCOMTR-MCNC: 102 MG/DL (ref 70–99)
GLUCOSE BLDC GLUCOMTR-MCNC: 102 MG/DL (ref 70–99)
GLUCOSE BLDC GLUCOMTR-MCNC: 103 MG/DL (ref 70–99)
GLUCOSE BLDC GLUCOMTR-MCNC: 109 MG/DL (ref 70–99)
GLUCOSE BLDC GLUCOMTR-MCNC: 112 MG/DL (ref 70–99)
GLUCOSE BLDC GLUCOMTR-MCNC: 121 MG/DL (ref 70–99)
GLUCOSE BLDC GLUCOMTR-MCNC: 126 MG/DL (ref 70–99)
GLUCOSE BLDC GLUCOMTR-MCNC: 127 MG/DL (ref 70–99)
GLUCOSE BLDC GLUCOMTR-MCNC: 128 MG/DL (ref 70–99)
GLUCOSE BLDC GLUCOMTR-MCNC: 132 MG/DL (ref 70–99)
GLUCOSE BLDC GLUCOMTR-MCNC: 138 MG/DL (ref 70–99)
GLUCOSE BLDC GLUCOMTR-MCNC: 142 MG/DL (ref 70–99)
GLUCOSE BLDC GLUCOMTR-MCNC: 143 MG/DL (ref 70–99)
GLUCOSE BLDC GLUCOMTR-MCNC: 144 MG/DL (ref 70–99)
GLUCOSE BLDC GLUCOMTR-MCNC: 144 MG/DL (ref 70–99)
GLUCOSE BLDC GLUCOMTR-MCNC: 145 MG/DL (ref 70–99)
GLUCOSE BLDC GLUCOMTR-MCNC: 145 MG/DL (ref 70–99)
GLUCOSE BLDC GLUCOMTR-MCNC: 146 MG/DL (ref 70–99)
GLUCOSE BLDC GLUCOMTR-MCNC: 146 MG/DL (ref 70–99)
GLUCOSE BLDC GLUCOMTR-MCNC: 147 MG/DL (ref 70–99)
GLUCOSE BLDC GLUCOMTR-MCNC: 147 MG/DL (ref 70–99)
GLUCOSE BLDC GLUCOMTR-MCNC: 150 MG/DL (ref 70–99)
GLUCOSE BLDC GLUCOMTR-MCNC: 151 MG/DL (ref 70–99)
GLUCOSE BLDC GLUCOMTR-MCNC: 152 MG/DL (ref 70–99)
GLUCOSE BLDC GLUCOMTR-MCNC: 153 MG/DL (ref 70–99)
GLUCOSE BLDC GLUCOMTR-MCNC: 153 MG/DL (ref 70–99)
GLUCOSE BLDC GLUCOMTR-MCNC: 154 MG/DL (ref 70–99)
GLUCOSE BLDC GLUCOMTR-MCNC: 156 MG/DL (ref 70–99)
GLUCOSE BLDC GLUCOMTR-MCNC: 158 MG/DL (ref 70–99)
GLUCOSE BLDC GLUCOMTR-MCNC: 161 MG/DL (ref 70–99)
GLUCOSE BLDC GLUCOMTR-MCNC: 162 MG/DL (ref 70–99)
GLUCOSE BLDC GLUCOMTR-MCNC: 163 MG/DL (ref 70–99)
GLUCOSE BLDC GLUCOMTR-MCNC: 166 MG/DL (ref 70–99)
GLUCOSE BLDC GLUCOMTR-MCNC: 167 MG/DL (ref 70–99)
GLUCOSE BLDC GLUCOMTR-MCNC: 169 MG/DL (ref 70–99)
GLUCOSE BLDC GLUCOMTR-MCNC: 76 MG/DL (ref 70–99)
GLUCOSE BLDC GLUCOMTR-MCNC: 80 MG/DL (ref 70–99)
GLUCOSE BLDC GLUCOMTR-MCNC: 92 MG/DL (ref 70–99)
GLUCOSE SERPL-MCNC: 100 MG/DL (ref 70–99)
GLUCOSE SERPL-MCNC: 107 MG/DL (ref 70–99)
GLUCOSE SERPL-MCNC: 108 MG/DL (ref 70–99)
GLUCOSE SERPL-MCNC: 123 MG/DL (ref 70–99)
GLUCOSE SERPL-MCNC: 124 MG/DL (ref 70–99)
GLUCOSE SERPL-MCNC: 124 MG/DL (ref 70–99)
GLUCOSE SERPL-MCNC: 125 MG/DL (ref 70–99)
GLUCOSE SERPL-MCNC: 125 MG/DL (ref 70–99)
GLUCOSE SERPL-MCNC: 126 MG/DL (ref 70–99)
GLUCOSE SERPL-MCNC: 130 MG/DL (ref 70–99)
GLUCOSE SERPL-MCNC: 130 MG/DL (ref 70–99)
GLUCOSE SERPL-MCNC: 131 MG/DL (ref 70–99)
GLUCOSE SERPL-MCNC: 135 MG/DL (ref 70–99)
GLUCOSE SERPL-MCNC: 135 MG/DL (ref 70–99)
GLUCOSE SERPL-MCNC: 143 MG/DL (ref 70–99)
GLUCOSE SERPL-MCNC: 147 MG/DL (ref 70–99)
GLUCOSE SERPL-MCNC: 147 MG/DL (ref 70–99)
GLUCOSE SERPL-MCNC: 150 MG/DL (ref 70–99)
GLUCOSE SERPL-MCNC: 151 MG/DL (ref 70–99)
GLUCOSE SERPL-MCNC: 157 MG/DL (ref 70–99)
GLUCOSE SERPL-MCNC: 158 MG/DL (ref 70–99)
GLUCOSE SERPL-MCNC: 166 MG/DL (ref 70–99)
GLUCOSE SERPL-MCNC: 171 MG/DL (ref 70–99)
GLUCOSE SERPL-MCNC: 178 MG/DL (ref 70–99)
GLUCOSE SERPL-MCNC: 85 MG/DL (ref 70–99)
GLUCOSE SERPL-MCNC: 96 MG/DL (ref 70–99)
GRAM STAIN RESULT: NORMAL
HBA1C MFR BLD: 4.8 %
HCO3 BLD-SCNC: 14 MMOL/L (ref 21–28)
HCO3 BLD-SCNC: 15 MMOL/L (ref 21–28)
HCO3 BLD-SCNC: 16 MMOL/L (ref 21–28)
HCO3 BLD-SCNC: 16 MMOL/L (ref 21–28)
HCO3 BLD-SCNC: 17 MMOL/L (ref 21–28)
HCO3 BLD-SCNC: 18 MMOL/L (ref 21–28)
HCO3 BLD-SCNC: 18 MMOL/L (ref 21–28)
HCO3 BLD-SCNC: 19 MMOL/L (ref 21–28)
HCO3 BLD-SCNC: 20 MMOL/L (ref 21–28)
HCO3 BLD-SCNC: 20 MMOL/L (ref 21–28)
HCO3 BLD-SCNC: 21 MMOL/L (ref 21–28)
HCO3 BLD-SCNC: 21 MMOL/L (ref 21–28)
HCO3 BLD-SCNC: 22 MMOL/L (ref 21–28)
HCO3 BLD-SCNC: 23 MMOL/L (ref 21–28)
HCO3 BLD-SCNC: 24 MMOL/L (ref 21–28)
HCO3 BLD-SCNC: 25 MMOL/L (ref 21–28)
HCO3 BLD-SCNC: 26 MMOL/L (ref 21–28)
HCO3 BLDA-SCNC: 11 MMOL/L (ref 21–28)
HCO3 BLDA-SCNC: 14 MMOL/L (ref 21–28)
HCO3 BLDA-SCNC: 18 MMOL/L (ref 21–28)
HCO3 BLDA-SCNC: 19 MMOL/L (ref 21–28)
HCO3 BLDA-SCNC: 19 MMOL/L (ref 21–28)
HCO3 BLDA-SCNC: 22 MMOL/L (ref 21–28)
HCO3 BLDA-SCNC: 24 MMOL/L (ref 21–28)
HCO3 BLDA-SCNC: 24 MMOL/L (ref 21–28)
HCO3 BLDA-SCNC: 25 MMOL/L (ref 21–28)
HCO3 BLDA-SCNC: 26 MMOL/L (ref 21–28)
HCO3 BLDA-SCNC: 27 MMOL/L (ref 21–28)
HCO3 BLDV-SCNC: 16 MMOL/L (ref 21–28)
HCO3 BLDV-SCNC: 19 MMOL/L (ref 21–28)
HCO3 BLDV-SCNC: 19 MMOL/L (ref 21–28)
HCO3 BLDV-SCNC: 23 MMOL/L (ref 21–28)
HCO3 BLDV-SCNC: 24 MMOL/L (ref 21–28)
HCO3 BLDV-SCNC: 24 MMOL/L (ref 21–28)
HCO3 BLDV-SCNC: 26 MMOL/L (ref 21–28)
HCO3 BLDV-SCNC: 27 MMOL/L (ref 21–28)
HCO3 BLDV-SCNC: 28 MMOL/L (ref 21–28)
HCT VFR BLD AUTO: 20.8 % (ref 40–53)
HCT VFR BLD AUTO: 21.6 % (ref 40–53)
HCT VFR BLD AUTO: 22.2 % (ref 40–53)
HCT VFR BLD AUTO: 23.1 % (ref 40–53)
HCT VFR BLD AUTO: 23.2 % (ref 40–53)
HCT VFR BLD AUTO: 23.2 % (ref 40–53)
HCT VFR BLD AUTO: 23.4 % (ref 40–53)
HCT VFR BLD AUTO: 23.5 % (ref 40–53)
HCT VFR BLD AUTO: 23.9 % (ref 40–53)
HCT VFR BLD AUTO: 24 % (ref 40–53)
HCT VFR BLD AUTO: 24 % (ref 40–53)
HCT VFR BLD AUTO: 24.2 % (ref 40–53)
HCT VFR BLD AUTO: 24.3 % (ref 40–53)
HCT VFR BLD AUTO: 24.5 % (ref 40–53)
HCT VFR BLD AUTO: 24.6 % (ref 40–53)
HCT VFR BLD AUTO: 24.7 % (ref 40–53)
HCT VFR BLD AUTO: 24.8 % (ref 40–53)
HCT VFR BLD AUTO: 24.9 % (ref 40–53)
HCT VFR BLD AUTO: 25.4 % (ref 40–53)
HCT VFR BLD AUTO: 25.5 % (ref 40–53)
HCT VFR BLD AUTO: 26.9 % (ref 40–53)
HCT VFR BLD AUTO: 27.2 % (ref 35–53)
HGB BLD-MCNC: 5.7 G/DL (ref 11.7–17.7)
HGB BLD-MCNC: 6.2 G/DL (ref 11.7–17.7)
HGB BLD-MCNC: 7 G/DL (ref 13.3–17.7)
HGB BLD-MCNC: 7.3 G/DL (ref 13.3–17.7)
HGB BLD-MCNC: 7.6 G/DL (ref 13.3–17.7)
HGB BLD-MCNC: 7.6 G/DL (ref 13.3–17.7)
HGB BLD-MCNC: 7.7 G/DL (ref 13.3–17.7)
HGB BLD-MCNC: 7.8 G/DL (ref 13.3–17.7)
HGB BLD-MCNC: 7.9 G/DL (ref 13.3–17.7)
HGB BLD-MCNC: 7.9 G/DL (ref 13.3–17.7)
HGB BLD-MCNC: 8 G/DL (ref 13.3–17.7)
HGB BLD-MCNC: 8 G/DL (ref 13.3–17.7)
HGB BLD-MCNC: 8.1 G/DL (ref 13.3–17.7)
HGB BLD-MCNC: 8.1 G/DL (ref 13.3–17.7)
HGB BLD-MCNC: 8.2 G/DL (ref 13.3–17.7)
HGB BLD-MCNC: 8.4 G/DL (ref 13.3–17.7)
HGB BLD-MCNC: 8.4 G/DL (ref 13.3–17.7)
HGB BLD-MCNC: 8.5 G/DL (ref 13.3–17.7)
HGB FREE PLAS-MCNC: 30 MG/DL
HGB FREE PLAS-MCNC: 50 MG/DL
HGB FREE PLAS-MCNC: <30 MG/DL
IMM GRANULOCYTES # BLD: 0.3 10E3/UL
IMM GRANULOCYTES NFR BLD: 2 %
INR PPP: 1.38 (ref 0.85–1.15)
INR PPP: 1.41 (ref 0.85–1.15)
INR PPP: 1.43 (ref 0.85–1.15)
INR PPP: 1.43 (ref 0.85–1.15)
INR PPP: 1.5 (ref 0.85–1.15)
INR PPP: 1.54 (ref 0.85–1.15)
INR PPP: 1.58 (ref 0.85–1.15)
INR PPP: 1.62 (ref 0.85–1.15)
INR PPP: 1.66 (ref 0.85–1.15)
INR PPP: 1.79 (ref 0.85–1.15)
INR PPP: 1.88 (ref 0.85–1.15)
INR PPP: 1.98 (ref 0.85–1.15)
INR PPP: 2.04 (ref 0.85–1.15)
INR PPP: 2.16 (ref 0.85–1.15)
INR PPP: 2.17 (ref 0.85–1.15)
INR PPP: 2.37 (ref 0.85–1.15)
INTERPRETATION ECG - MUSE: NORMAL
INTERPRETATION TEGPIA: NORMAL
INTERPRETATION TEGPIA: NORMAL
ISSUE DATE AND TIME: NORMAL
LACTATE BLD-SCNC: 13.2 MMOL/L (ref 0.7–2)
LACTATE BLD-SCNC: 14.1 MMOL/L (ref 0.7–2)
LACTATE SERPL-SCNC: 1.1 MMOL/L (ref 0.7–2)
LACTATE SERPL-SCNC: 1.3 MMOL/L (ref 0.7–2)
LACTATE SERPL-SCNC: 1.6 MMOL/L (ref 0.7–2)
LACTATE SERPL-SCNC: 1.6 MMOL/L (ref 0.7–2)
LACTATE SERPL-SCNC: 1.7 MMOL/L (ref 0.7–2)
LACTATE SERPL-SCNC: 1.8 MMOL/L (ref 0.7–2)
LACTATE SERPL-SCNC: 1.9 MMOL/L (ref 0.7–2)
LACTATE SERPL-SCNC: 10.3 MMOL/L (ref 0.7–2)
LACTATE SERPL-SCNC: 10.4 MMOL/L (ref 0.7–2)
LACTATE SERPL-SCNC: 10.6 MMOL/L (ref 0.7–2)
LACTATE SERPL-SCNC: 10.6 MMOL/L (ref 0.7–2)
LACTATE SERPL-SCNC: 10.8 MMOL/L (ref 0.7–2)
LACTATE SERPL-SCNC: 10.9 MMOL/L (ref 0.7–2)
LACTATE SERPL-SCNC: 10.9 MMOL/L (ref 0.7–2)
LACTATE SERPL-SCNC: 11 MMOL/L (ref 0.7–2)
LACTATE SERPL-SCNC: 11.3 MMOL/L (ref 0.7–2)
LACTATE SERPL-SCNC: 11.3 MMOL/L (ref 0.7–2)
LACTATE SERPL-SCNC: 11.9 MMOL/L (ref 0.7–2)
LACTATE SERPL-SCNC: 11.9 MMOL/L (ref 0.7–2)
LACTATE SERPL-SCNC: 2 MMOL/L (ref 0.7–2)
LACTATE SERPL-SCNC: 2.1 MMOL/L (ref 0.7–2)
LACTATE SERPL-SCNC: 2.3 MMOL/L (ref 0.7–2)
LACTATE SERPL-SCNC: 2.8 MMOL/L (ref 0.7–2)
LACTATE SERPL-SCNC: 3.3 MMOL/L (ref 0.7–2)
LACTATE SERPL-SCNC: 4.2 MMOL/L (ref 0.7–2)
LACTATE SERPL-SCNC: 4.6 MMOL/L (ref 0.7–2)
LACTATE SERPL-SCNC: 5.2 MMOL/L (ref 0.7–2)
LACTATE SERPL-SCNC: 5.6 MMOL/L (ref 0.7–2)
LACTATE SERPL-SCNC: 6.1 MMOL/L (ref 0.7–2)
LACTATE SERPL-SCNC: 6.7 MMOL/L (ref 0.7–2)
LACTATE SERPL-SCNC: 6.8 MMOL/L (ref 0.7–2)
LACTATE SERPL-SCNC: 7.2 MMOL/L (ref 0.7–2)
LACTATE SERPL-SCNC: 8 MMOL/L (ref 0.7–2)
LACTATE SERPL-SCNC: 8.9 MMOL/L (ref 0.7–2)
LACTATE SERPL-SCNC: 9.7 MMOL/L (ref 0.7–2)
LDH SERPL L TO P-CCNC: 698 U/L (ref 0–250)
LDH SERPL L TO P-CCNC: 789 U/L (ref 0–250)
LDH SERPL L TO P-CCNC: 822 U/L (ref 0–250)
LDH SERPL L TO P-CCNC: 827 U/L (ref 0–250)
LDH SERPL L TO P-CCNC: 966 U/L (ref 0–250)
LDH SERPL L TO P-CCNC: 994 U/L (ref 0–250)
LVEF ECHO: NORMAL
LYMPHOCYTES # BLD AUTO: 0.8 10E3/UL (ref 0.8–5.3)
LYMPHOCYTES NFR BLD AUTO: 5 %
MAGNESIUM SERPL-MCNC: 1.9 MG/DL (ref 1.7–2.3)
MAGNESIUM SERPL-MCNC: 1.9 MG/DL (ref 1.7–2.3)
MAGNESIUM SERPL-MCNC: 2 MG/DL (ref 1.7–2.3)
MAGNESIUM SERPL-MCNC: 2 MG/DL (ref 1.7–2.3)
MAGNESIUM SERPL-MCNC: 2.1 MG/DL (ref 1.7–2.3)
MAGNESIUM SERPL-MCNC: 2.2 MG/DL (ref 1.7–2.3)
MAGNESIUM SERPL-MCNC: 2.3 MG/DL (ref 1.7–2.3)
MAGNESIUM SERPL-MCNC: 2.3 MG/DL (ref 1.7–2.3)
MCF P HPASE BLD TEG: 49.7 MM (ref 50–70)
MCF P HPASE BLD TEG: 53.9 MM (ref 50–70)
MCF P HPASE BLD TEG: 60.7 MM (ref 50–70)
MCH RBC QN AUTO: 30.2 PG (ref 26.5–33)
MCH RBC QN AUTO: 30.4 PG (ref 26.5–33)
MCH RBC QN AUTO: 30.5 PG (ref 26.5–33)
MCH RBC QN AUTO: 30.6 PG (ref 26.5–33)
MCH RBC QN AUTO: 30.6 PG (ref 26.5–33)
MCH RBC QN AUTO: 30.7 PG (ref 26.5–33)
MCH RBC QN AUTO: 30.8 PG (ref 26.5–33)
MCH RBC QN AUTO: 30.8 PG (ref 26.5–33)
MCH RBC QN AUTO: 30.9 PG (ref 26.5–33)
MCH RBC QN AUTO: 31 PG (ref 26.5–33)
MCH RBC QN AUTO: 31.1 PG (ref 26.5–33)
MCH RBC QN AUTO: 31.2 PG (ref 26.5–33)
MCH RBC QN AUTO: 31.4 PG (ref 26.5–33)
MCHC RBC AUTO-ENTMCNC: 31.3 G/DL (ref 31.5–36.5)
MCHC RBC AUTO-ENTMCNC: 31.6 G/DL (ref 31.5–36.5)
MCHC RBC AUTO-ENTMCNC: 31.8 G/DL (ref 31.5–36.5)
MCHC RBC AUTO-ENTMCNC: 31.9 G/DL (ref 31.5–36.5)
MCHC RBC AUTO-ENTMCNC: 31.9 G/DL (ref 31.5–36.5)
MCHC RBC AUTO-ENTMCNC: 32.1 G/DL (ref 31.5–36.5)
MCHC RBC AUTO-ENTMCNC: 32.8 G/DL (ref 31.5–36.5)
MCHC RBC AUTO-ENTMCNC: 33.3 G/DL (ref 31.5–36.5)
MCHC RBC AUTO-ENTMCNC: 33.5 G/DL (ref 31.5–36.5)
MCHC RBC AUTO-ENTMCNC: 33.6 G/DL (ref 31.5–36.5)
MCHC RBC AUTO-ENTMCNC: 33.6 G/DL (ref 31.5–36.5)
MCHC RBC AUTO-ENTMCNC: 33.7 G/DL (ref 31.5–36.5)
MCHC RBC AUTO-ENTMCNC: 33.8 G/DL (ref 31.5–36.5)
MCHC RBC AUTO-ENTMCNC: 34 G/DL (ref 31.5–36.5)
MCHC RBC AUTO-ENTMCNC: 34.1 G/DL (ref 31.5–36.5)
MCHC RBC AUTO-ENTMCNC: 34.2 G/DL (ref 31.5–36.5)
MCHC RBC AUTO-ENTMCNC: 34.3 G/DL (ref 31.5–36.5)
MCV RBC AUTO: 90 FL (ref 78–100)
MCV RBC AUTO: 91 FL (ref 78–100)
MCV RBC AUTO: 92 FL (ref 78–100)
MCV RBC AUTO: 95 FL (ref 78–100)
MCV RBC AUTO: 95 FL (ref 78–100)
MCV RBC AUTO: 96 FL (ref 78–100)
MCV RBC AUTO: 97 FL (ref 78–100)
MCV RBC AUTO: 97 FL (ref 78–100)
MCV RBC AUTO: 98 FL (ref 78–100)
MCV RBC AUTO: 99 FL (ref 78–100)
MDC_IDC_EPISODE_DTM: NORMAL
MDC_IDC_EPISODE_DURATION: 0 S
MDC_IDC_EPISODE_DURATION: 1 S
MDC_IDC_EPISODE_DURATION: 1012 S
MDC_IDC_EPISODE_DURATION: 105 S
MDC_IDC_EPISODE_DURATION: 119 S
MDC_IDC_EPISODE_DURATION: 125 S
MDC_IDC_EPISODE_DURATION: 128 S
MDC_IDC_EPISODE_DURATION: 13 S
MDC_IDC_EPISODE_DURATION: 137 S
MDC_IDC_EPISODE_DURATION: 142 S
MDC_IDC_EPISODE_DURATION: 146 S
MDC_IDC_EPISODE_DURATION: 153 S
MDC_IDC_EPISODE_DURATION: 162 S
MDC_IDC_EPISODE_DURATION: 169 S
MDC_IDC_EPISODE_DURATION: 178 S
MDC_IDC_EPISODE_DURATION: 18 S
MDC_IDC_EPISODE_DURATION: 18 S
MDC_IDC_EPISODE_DURATION: 197 S
MDC_IDC_EPISODE_DURATION: 199 S
MDC_IDC_EPISODE_DURATION: 201 S
MDC_IDC_EPISODE_DURATION: 203 S
MDC_IDC_EPISODE_DURATION: 223 S
MDC_IDC_EPISODE_DURATION: 24 S
MDC_IDC_EPISODE_DURATION: 25 S
MDC_IDC_EPISODE_DURATION: 27 S
MDC_IDC_EPISODE_DURATION: 27 S
MDC_IDC_EPISODE_DURATION: 270 S
MDC_IDC_EPISODE_DURATION: 286 S
MDC_IDC_EPISODE_DURATION: 3 S
MDC_IDC_EPISODE_DURATION: 30 S
MDC_IDC_EPISODE_DURATION: 31 S
MDC_IDC_EPISODE_DURATION: 319 S
MDC_IDC_EPISODE_DURATION: 34 S
MDC_IDC_EPISODE_DURATION: 347 S
MDC_IDC_EPISODE_DURATION: 35 S
MDC_IDC_EPISODE_DURATION: 37 S
MDC_IDC_EPISODE_DURATION: 384 S
MDC_IDC_EPISODE_DURATION: 45 S
MDC_IDC_EPISODE_DURATION: 47 S
MDC_IDC_EPISODE_DURATION: 477 S
MDC_IDC_EPISODE_DURATION: 481 S
MDC_IDC_EPISODE_DURATION: 49 S
MDC_IDC_EPISODE_DURATION: 54 S
MDC_IDC_EPISODE_DURATION: 57 S
MDC_IDC_EPISODE_DURATION: 58 S
MDC_IDC_EPISODE_DURATION: 59 S
MDC_IDC_EPISODE_DURATION: 640 S
MDC_IDC_EPISODE_DURATION: 74 S
MDC_IDC_EPISODE_DURATION: 84 S
MDC_IDC_EPISODE_DURATION: 88 S
MDC_IDC_EPISODE_DURATION: 89 S
MDC_IDC_EPISODE_DURATION: 92 S
MDC_IDC_EPISODE_DURATION: 98 S
MDC_IDC_EPISODE_ID: NORMAL
MDC_IDC_EPISODE_TYPE: NORMAL
MDC_IDC_EPISODE_TYPE_INDUCED: NO
MDC_IDC_LEAD_CONNECTION_STATUS: NORMAL
MDC_IDC_LEAD_CONNECTION_STATUS: NORMAL
MDC_IDC_LEAD_IMPLANT_DT: NORMAL
MDC_IDC_LEAD_IMPLANT_DT: NORMAL
MDC_IDC_LEAD_LOCATION: NORMAL
MDC_IDC_LEAD_LOCATION: NORMAL
MDC_IDC_LEAD_LOCATION_DETAIL_1: NORMAL
MDC_IDC_LEAD_LOCATION_DETAIL_1: NORMAL
MDC_IDC_LEAD_MFG: NORMAL
MDC_IDC_LEAD_MFG: NORMAL
MDC_IDC_LEAD_MODEL: NORMAL
MDC_IDC_LEAD_MODEL: NORMAL
MDC_IDC_LEAD_POLARITY_TYPE: NORMAL
MDC_IDC_LEAD_POLARITY_TYPE: NORMAL
MDC_IDC_LEAD_SERIAL: NORMAL
MDC_IDC_LEAD_SERIAL: NORMAL
MDC_IDC_MSMT_BATTERY_DTM: NORMAL
MDC_IDC_MSMT_BATTERY_REMAINING_LONGEVITY: 42 MO
MDC_IDC_MSMT_BATTERY_RRT_TRIGGER: 2.62
MDC_IDC_MSMT_BATTERY_STATUS: NORMAL
MDC_IDC_MSMT_BATTERY_VOLTAGE: 2.95 V
MDC_IDC_MSMT_LEADCHNL_RA_IMPEDANCE_VALUE: 228 OHM
MDC_IDC_MSMT_LEADCHNL_RA_IMPEDANCE_VALUE: 285 OHM
MDC_IDC_MSMT_LEADCHNL_RA_PACING_THRESHOLD_AMPLITUDE: 1 V
MDC_IDC_MSMT_LEADCHNL_RA_PACING_THRESHOLD_PULSEWIDTH: 0.4 MS
MDC_IDC_MSMT_LEADCHNL_RA_SENSING_INTR_AMPL: 0.5 MV
MDC_IDC_MSMT_LEADCHNL_RV_IMPEDANCE_VALUE: 228 OHM
MDC_IDC_MSMT_LEADCHNL_RV_IMPEDANCE_VALUE: 285 OHM
MDC_IDC_MSMT_LEADCHNL_RV_PACING_THRESHOLD_AMPLITUDE: 1.75 V
MDC_IDC_MSMT_LEADCHNL_RV_PACING_THRESHOLD_PULSEWIDTH: 0.4 MS
MDC_IDC_PG_IMPLANT_DTM: NORMAL
MDC_IDC_PG_MFG: NORMAL
MDC_IDC_PG_MODEL: NORMAL
MDC_IDC_PG_SERIAL: NORMAL
MDC_IDC_PG_TYPE: NORMAL
MDC_IDC_SESS_CLINIC_NAME: NORMAL
MDC_IDC_SESS_DTM: NORMAL
MDC_IDC_SESS_TYPE: NORMAL
MDC_IDC_SET_BRADY_AT_MODE_SWITCH_RATE: 171 {BEATS}/MIN
MDC_IDC_SET_BRADY_HYSTRATE: NORMAL
MDC_IDC_SET_BRADY_LOWRATE: 60 {BEATS}/MIN
MDC_IDC_SET_BRADY_MAX_SENSOR_RATE: 130 {BEATS}/MIN
MDC_IDC_SET_BRADY_MAX_TRACKING_RATE: 130 {BEATS}/MIN
MDC_IDC_SET_BRADY_MODE: NORMAL
MDC_IDC_SET_BRADY_PAV_DELAY_LOW: 180 MS
MDC_IDC_SET_BRADY_SAV_DELAY_LOW: 150 MS
MDC_IDC_SET_LEADCHNL_RA_PACING_AMPLITUDE: 1.75 V
MDC_IDC_SET_LEADCHNL_RA_PACING_ANODE_ELECTRODE_1: NORMAL
MDC_IDC_SET_LEADCHNL_RA_PACING_ANODE_LOCATION_1: NORMAL
MDC_IDC_SET_LEADCHNL_RA_PACING_CAPTURE_MODE: NORMAL
MDC_IDC_SET_LEADCHNL_RA_PACING_CATHODE_ELECTRODE_1: NORMAL
MDC_IDC_SET_LEADCHNL_RA_PACING_CATHODE_LOCATION_1: NORMAL
MDC_IDC_SET_LEADCHNL_RA_PACING_POLARITY: NORMAL
MDC_IDC_SET_LEADCHNL_RA_PACING_PULSEWIDTH: 0.4 MS
MDC_IDC_SET_LEADCHNL_RA_SENSING_ANODE_ELECTRODE_1: NORMAL
MDC_IDC_SET_LEADCHNL_RA_SENSING_ANODE_LOCATION_1: NORMAL
MDC_IDC_SET_LEADCHNL_RA_SENSING_CATHODE_ELECTRODE_1: NORMAL
MDC_IDC_SET_LEADCHNL_RA_SENSING_CATHODE_LOCATION_1: NORMAL
MDC_IDC_SET_LEADCHNL_RA_SENSING_POLARITY: NORMAL
MDC_IDC_SET_LEADCHNL_RA_SENSING_SENSITIVITY: 0.15 MV
MDC_IDC_SET_LEADCHNL_RV_PACING_AMPLITUDE: 3.75 V
MDC_IDC_SET_LEADCHNL_RV_PACING_ANODE_ELECTRODE_1: NORMAL
MDC_IDC_SET_LEADCHNL_RV_PACING_ANODE_LOCATION_1: NORMAL
MDC_IDC_SET_LEADCHNL_RV_PACING_CAPTURE_MODE: NORMAL
MDC_IDC_SET_LEADCHNL_RV_PACING_CATHODE_ELECTRODE_1: NORMAL
MDC_IDC_SET_LEADCHNL_RV_PACING_CATHODE_LOCATION_1: NORMAL
MDC_IDC_SET_LEADCHNL_RV_PACING_POLARITY: NORMAL
MDC_IDC_SET_LEADCHNL_RV_PACING_PULSEWIDTH: 0.4 MS
MDC_IDC_SET_LEADCHNL_RV_SENSING_ANODE_ELECTRODE_1: NORMAL
MDC_IDC_SET_LEADCHNL_RV_SENSING_ANODE_LOCATION_1: NORMAL
MDC_IDC_SET_LEADCHNL_RV_SENSING_CATHODE_ELECTRODE_1: NORMAL
MDC_IDC_SET_LEADCHNL_RV_SENSING_CATHODE_LOCATION_1: NORMAL
MDC_IDC_SET_LEADCHNL_RV_SENSING_POLARITY: NORMAL
MDC_IDC_SET_LEADCHNL_RV_SENSING_SENSITIVITY: 0.9 MV
MDC_IDC_SET_ZONE_DETECTION_INTERVAL: 350 MS
MDC_IDC_SET_ZONE_DETECTION_INTERVAL: 350 MS
MDC_IDC_SET_ZONE_STATUS: NORMAL
MDC_IDC_SET_ZONE_STATUS: NORMAL
MDC_IDC_SET_ZONE_TYPE: NORMAL
MDC_IDC_SET_ZONE_VENDOR_TYPE: NORMAL
MDC_IDC_STAT_AT_BURDEN_PERCENT: 36.7 %
MDC_IDC_STAT_AT_DTM_END: NORMAL
MDC_IDC_STAT_AT_DTM_START: NORMAL
MDC_IDC_STAT_BRADY_AP_VP_PERCENT: 40.26 %
MDC_IDC_STAT_BRADY_AP_VS_PERCENT: 0.91 %
MDC_IDC_STAT_BRADY_AS_VP_PERCENT: 45.35 %
MDC_IDC_STAT_BRADY_AS_VS_PERCENT: 13.44 %
MDC_IDC_STAT_BRADY_DTM_END: NORMAL
MDC_IDC_STAT_BRADY_DTM_START: NORMAL
MDC_IDC_STAT_BRADY_RA_PERCENT_PACED: 35.97 %
MDC_IDC_STAT_BRADY_RV_PERCENT_PACED: 84.03 %
MDC_IDC_STAT_EPISODE_RECENT_COUNT: 0
MDC_IDC_STAT_EPISODE_RECENT_COUNT: 0
MDC_IDC_STAT_EPISODE_RECENT_COUNT: 1
MDC_IDC_STAT_EPISODE_RECENT_COUNT: 26
MDC_IDC_STAT_EPISODE_RECENT_COUNT: 5199
MDC_IDC_STAT_EPISODE_RECENT_COUNT_DTM_END: NORMAL
MDC_IDC_STAT_EPISODE_RECENT_COUNT_DTM_START: NORMAL
MDC_IDC_STAT_EPISODE_TOTAL_COUNT: 0
MDC_IDC_STAT_EPISODE_TOTAL_COUNT: 0
MDC_IDC_STAT_EPISODE_TOTAL_COUNT: 1
MDC_IDC_STAT_EPISODE_TOTAL_COUNT: 43
MDC_IDC_STAT_EPISODE_TOTAL_COUNT: NORMAL
MDC_IDC_STAT_EPISODE_TOTAL_COUNT_DTM_END: NORMAL
MDC_IDC_STAT_EPISODE_TOTAL_COUNT_DTM_START: NORMAL
MDC_IDC_STAT_EPISODE_TYPE: NORMAL
MDC_IDC_STAT_TACHYTHERAPY_RECENT_DTM_END: NORMAL
MDC_IDC_STAT_TACHYTHERAPY_RECENT_DTM_START: NORMAL
MDC_IDC_STAT_TACHYTHERAPY_TOTAL_DTM_END: NORMAL
MDC_IDC_STAT_TACHYTHERAPY_TOTAL_DTM_START: NORMAL
MONOCYTES # BLD AUTO: 0.1 10E3/UL (ref 0–1.3)
MONOCYTES NFR BLD AUTO: 1 %
MRSA DNA SPEC QL NAA+PROBE: NEGATIVE
NEUTROPHILS # BLD AUTO: 14.8 10E3/UL (ref 1.6–8.3)
NEUTROPHILS NFR BLD AUTO: 92 %
NRBC # BLD AUTO: 0.1 10E3/UL
NRBC BLD AUTO-RTO: 0 /100
NSE SERPL IA-MCNC: 117.3 NG/ML
NSE SERPL IA-MCNC: 52.6 NG/ML
NSE SERPL IA-MCNC: 91.9 NG/ML
O2/TOTAL GAS SETTING VFR VENT: 100 %
O2/TOTAL GAS SETTING VFR VENT: 100 %
O2/TOTAL GAS SETTING VFR VENT: 30 %
O2/TOTAL GAS SETTING VFR VENT: 40 %
O2/TOTAL GAS SETTING VFR VENT: 50 %
O2/TOTAL GAS SETTING VFR VENT: 55 %
O2/TOTAL GAS SETTING VFR VENT: 60 %
O2/TOTAL GAS SETTING VFR VENT: 70 %
O2/TOTAL GAS SETTING VFR VENT: 70 %
OXYHGB MFR BLDA: 13 % (ref 92–100)
OXYHGB MFR BLDA: 90 % (ref 75–100)
OXYHGB MFR BLDA: 91 % (ref 75–100)
OXYHGB MFR BLDA: 92 % (ref 75–100)
OXYHGB MFR BLDA: 93 % (ref 75–100)
OXYHGB MFR BLDA: 96 % (ref 75–100)
OXYHGB MFR BLDA: 97 % (ref 75–100)
OXYHGB MFR BLDA: 97 % (ref 92–100)
OXYHGB MFR BLDA: 98 % (ref 75–100)
OXYHGB MFR BLDV: 67 %
OXYHGB MFR BLDV: 67 %
OXYHGB MFR BLDV: 68 %
OXYHGB MFR BLDV: 68 %
OXYHGB MFR BLDV: 73 %
OXYHGB MFR BLDV: 75 %
OXYHGB MFR BLDV: 76 %
OXYHGB MFR BLDV: 78 %
OXYHGB MFR BLDV: 78 %
OXYHGB MFR BLDV: 80 %
OXYHGB MFR BLDV: 80 %
P AXIS - MUSE: NORMAL DEGREES
PA AA BLD-ACNC: 100 %
PA AA BLD-ACNC: 21 %
PA ADP BLD-ACNC: 35 %
PA ADP BLD-ACNC: 44 %
PCO2 BLD: 24 MM HG (ref 35–45)
PCO2 BLD: 27 MM HG (ref 35–45)
PCO2 BLD: 30 MM HG (ref 35–45)
PCO2 BLD: 30 MM HG (ref 35–45)
PCO2 BLD: 31 MM HG (ref 35–45)
PCO2 BLD: 31 MM HG (ref 35–45)
PCO2 BLD: 32 MM HG (ref 35–45)
PCO2 BLD: 32 MM HG (ref 35–45)
PCO2 BLD: 33 MM HG (ref 35–45)
PCO2 BLD: 34 MM HG (ref 35–45)
PCO2 BLD: 34 MM HG (ref 35–45)
PCO2 BLD: 35 MM HG (ref 35–45)
PCO2 BLD: 36 MM HG (ref 35–45)
PCO2 BLD: 37 MM HG (ref 35–45)
PCO2 BLD: 38 MM HG (ref 35–45)
PCO2 BLD: 38 MM HG (ref 35–45)
PCO2 BLD: 39 MM HG (ref 35–45)
PCO2 BLD: 40 MM HG (ref 35–45)
PCO2 BLD: 41 MM HG (ref 35–45)
PCO2 BLD: 42 MM HG (ref 35–45)
PCO2 BLD: 43 MM HG (ref 35–45)
PCO2 BLD: 44 MM HG (ref 35–45)
PCO2 BLD: 47 MM HG (ref 35–45)
PCO2 BLDA: 30 MM HG (ref 35–45)
PCO2 BLDA: 45 MM HG (ref 35–45)
PCO2 BLDA: 46 MM HG (ref 35–45)
PCO2 BLDA: 47 MM HG (ref 35–45)
PCO2 BLDA: 49 MM HG (ref 35–45)
PCO2 BLDA: 49 MM HG (ref 35–45)
PCO2 BLDA: 51 MM HG (ref 35–45)
PCO2 BLDA: 52 MM HG (ref 35–45)
PCO2 BLDA: 53 MM HG (ref 35–45)
PCO2 BLDA: 53 MM HG (ref 35–45)
PCO2 BLDA: 54 MM HG (ref 35–45)
PCO2 BLDA: 55 MM HG (ref 35–45)
PCO2 BLDA: 58 MM HG (ref 35–45)
PCO2 BLDA: 59 MM HG (ref 35–45)
PCO2 BLDA: 64 MM HG (ref 35–45)
PCO2 BLDA: >130 MM HG (ref 35–45)
PCO2 BLDV: 36 MM HG (ref 40–50)
PCO2 BLDV: 48 MM HG (ref 40–50)
PCO2 BLDV: 50 MM HG (ref 40–50)
PCO2 BLDV: 50 MM HG (ref 40–50)
PCO2 BLDV: 52 MM HG (ref 40–50)
PCO2 BLDV: 52 MM HG (ref 40–50)
PCO2 BLDV: 53 MM HG (ref 40–50)
PCO2 BLDV: 55 MM HG (ref 40–50)
PCO2 BLDV: 56 MM HG (ref 40–50)
PCO2 BLDV: 57 MM HG (ref 40–50)
PCO2 BLDV: 65 MM HG (ref 40–50)
PEEP: 5 CM H2O
PEEP: 5 CM H2O
PEEP: 7 CM H2O
PEEP: 8 CM H2O
PH BLD: 7.23 [PH] (ref 7.35–7.45)
PH BLD: 7.25 [PH] (ref 7.35–7.45)
PH BLD: 7.26 [PH] (ref 7.35–7.45)
PH BLD: 7.27 [PH] (ref 7.35–7.45)
PH BLD: 7.28 [PH] (ref 7.35–7.45)
PH BLD: 7.3 [PH] (ref 7.35–7.45)
PH BLD: 7.31 [PH] (ref 7.35–7.45)
PH BLD: 7.31 [PH] (ref 7.35–7.45)
PH BLD: 7.32 [PH] (ref 7.35–7.45)
PH BLD: 7.33 [PH] (ref 7.35–7.45)
PH BLD: 7.34 [PH] (ref 7.35–7.45)
PH BLD: 7.35 [PH] (ref 7.35–7.45)
PH BLD: 7.36 [PH] (ref 7.35–7.45)
PH BLD: 7.36 [PH] (ref 7.35–7.45)
PH BLD: 7.37 [PH] (ref 7.35–7.45)
PH BLD: 7.38 [PH] (ref 7.35–7.45)
PH BLD: 7.39 [PH] (ref 7.35–7.45)
PH BLD: 7.4 [PH] (ref 7.35–7.45)
PH BLD: 7.41 [PH] (ref 7.35–7.45)
PH BLD: 7.41 [PH] (ref 7.35–7.45)
PH BLD: 7.42 [PH] (ref 7.35–7.45)
PH BLD: 7.43 [PH] (ref 7.35–7.45)
PH BLD: 7.44 [PH] (ref 7.35–7.45)
PH BLD: 7.45 [PH] (ref 7.35–7.45)
PH BLDA: 6.95 [PH] (ref 7.35–7.45)
PH BLDA: 7.1 [PH] (ref 7.35–7.45)
PH BLDA: 7.13 [PH] (ref 7.35–7.45)
PH BLDA: 7.15 [PH] (ref 7.35–7.45)
PH BLDA: 7.17 [PH] (ref 7.35–7.45)
PH BLDA: 7.28 [PH] (ref 7.35–7.45)
PH BLDA: 7.29 [PH] (ref 7.35–7.45)
PH BLDA: 7.3 [PH] (ref 7.35–7.45)
PH BLDA: 7.3 [PH] (ref 7.35–7.45)
PH BLDA: 7.31 [PH] (ref 7.35–7.45)
PH BLDA: 7.35 [PH] (ref 7.35–7.45)
PH BLDA: 7.36 [PH] (ref 7.35–7.45)
PH BLDA: 7.37 [PH] (ref 7.35–7.45)
PH BLDA: <6.75 [PH] (ref 7.35–7.45)
PH BLDV: 7.13 [PH] (ref 7.32–7.43)
PH BLDV: 7.16 [PH] (ref 7.32–7.43)
PH BLDV: 7.17 [PH] (ref 7.32–7.43)
PH BLDV: 7.25 [PH] (ref 7.32–7.43)
PH BLDV: 7.27 [PH] (ref 7.32–7.43)
PH BLDV: 7.29 [PH] (ref 7.32–7.43)
PH BLDV: 7.3 [PH] (ref 7.32–7.43)
PH BLDV: 7.35 [PH] (ref 7.32–7.43)
PH BLDV: 7.37 [PH] (ref 7.32–7.43)
PHOSPHATE SERPL-MCNC: 3 MG/DL (ref 2.5–4.5)
PHOSPHATE SERPL-MCNC: 3 MG/DL (ref 2.5–4.5)
PHOSPHATE SERPL-MCNC: 3.4 MG/DL (ref 2.5–4.5)
PHOSPHATE SERPL-MCNC: 3.4 MG/DL (ref 2.5–4.5)
PHOSPHATE SERPL-MCNC: 3.5 MG/DL (ref 2.5–4.5)
PHOSPHATE SERPL-MCNC: 3.9 MG/DL (ref 2.5–4.5)
PHOSPHATE SERPL-MCNC: 4.6 MG/DL (ref 2.5–4.5)
PHOSPHATE SERPL-MCNC: 4.6 MG/DL (ref 2.5–4.5)
PHOSPHATE SERPL-MCNC: 4.9 MG/DL (ref 2.5–4.5)
PHOSPHATE SERPL-MCNC: 6.3 MG/DL (ref 2.5–4.5)
PHOSPHATE SERPL-MCNC: 6.3 MG/DL (ref 2.5–4.5)
PHOSPHATE SERPL-MCNC: 7.2 MG/DL (ref 2.5–4.5)
PLATELET # BLD AUTO: 101 10E3/UL (ref 150–450)
PLATELET # BLD AUTO: 102 10E3/UL (ref 150–450)
PLATELET # BLD AUTO: 103 10E3/UL (ref 150–450)
PLATELET # BLD AUTO: 105 10E3/UL (ref 150–450)
PLATELET # BLD AUTO: 105 10E3/UL (ref 150–450)
PLATELET # BLD AUTO: 111 10E3/UL (ref 150–450)
PLATELET # BLD AUTO: 116 10E3/UL (ref 150–450)
PLATELET # BLD AUTO: 123 10E3/UL (ref 150–450)
PLATELET # BLD AUTO: 149 10E3/UL (ref 150–450)
PLATELET # BLD AUTO: 152 10E3/UL (ref 150–450)
PLATELET # BLD AUTO: 153 10E3/UL (ref 150–450)
PLATELET # BLD AUTO: 162 10E3/UL (ref 150–450)
PLATELET # BLD AUTO: 81 10E3/UL (ref 150–450)
PLATELET # BLD AUTO: 81 10E3/UL (ref 150–450)
PLATELET # BLD AUTO: 84 10E3/UL (ref 150–450)
PLATELET # BLD AUTO: 87 10E3/UL (ref 150–450)
PLATELET # BLD AUTO: 87 10E3/UL (ref 150–450)
PLATELET # BLD AUTO: 93 10E3/UL (ref 150–450)
PLATELET # BLD AUTO: 96 10E3/UL (ref 150–450)
PLATELET # BLD AUTO: 97 10E3/UL (ref 150–450)
PLATELET # BLD AUTO: 97 10E3/UL (ref 150–450)
PLATELET # BLD AUTO: 99 10E3/UL (ref 150–450)
PO2 BLD: 100 MM HG (ref 80–105)
PO2 BLD: 101 MM HG (ref 80–105)
PO2 BLD: 106 MM HG (ref 80–105)
PO2 BLD: 107 MM HG (ref 80–105)
PO2 BLD: 107 MM HG (ref 80–105)
PO2 BLD: 110 MM HG (ref 80–105)
PO2 BLD: 111 MM HG (ref 80–105)
PO2 BLD: 113 MM HG (ref 80–105)
PO2 BLD: 113 MM HG (ref 80–105)
PO2 BLD: 114 MM HG (ref 80–105)
PO2 BLD: 116 MM HG (ref 80–105)
PO2 BLD: 117 MM HG (ref 80–105)
PO2 BLD: 117 MM HG (ref 80–105)
PO2 BLD: 119 MM HG (ref 80–105)
PO2 BLD: 121 MM HG (ref 80–105)
PO2 BLD: 122 MM HG (ref 80–105)
PO2 BLD: 124 MM HG (ref 80–105)
PO2 BLD: 128 MM HG (ref 80–105)
PO2 BLD: 130 MM HG (ref 80–105)
PO2 BLD: 131 MM HG (ref 80–105)
PO2 BLD: 132 MM HG (ref 80–105)
PO2 BLD: 134 MM HG (ref 80–105)
PO2 BLD: 136 MM HG (ref 80–105)
PO2 BLD: 136 MM HG (ref 80–105)
PO2 BLD: 138 MM HG (ref 80–105)
PO2 BLD: 140 MM HG (ref 80–105)
PO2 BLD: 141 MM HG (ref 80–105)
PO2 BLD: 146 MM HG (ref 80–105)
PO2 BLD: 147 MM HG (ref 80–105)
PO2 BLD: 147 MM HG (ref 80–105)
PO2 BLD: 148 MM HG (ref 80–105)
PO2 BLD: 149 MM HG (ref 80–105)
PO2 BLD: 155 MM HG (ref 80–105)
PO2 BLD: 160 MM HG (ref 80–105)
PO2 BLD: 166 MM HG (ref 80–105)
PO2 BLD: 170 MM HG (ref 80–105)
PO2 BLD: 171 MM HG (ref 80–105)
PO2 BLD: 171 MM HG (ref 80–105)
PO2 BLD: 176 MM HG (ref 80–105)
PO2 BLD: 176 MM HG (ref 80–105)
PO2 BLD: 193 MM HG (ref 80–105)
PO2 BLD: 198 MM HG (ref 80–105)
PO2 BLD: 200 MM HG (ref 80–105)
PO2 BLD: 70 MM HG (ref 80–105)
PO2 BLD: 72 MM HG (ref 80–105)
PO2 BLD: 73 MM HG (ref 80–105)
PO2 BLD: 75 MM HG (ref 80–105)
PO2 BLD: 92 MM HG (ref 80–105)
PO2 BLDA: 115 MM HG (ref 80–105)
PO2 BLDA: 117 MM HG (ref 80–105)
PO2 BLDA: 121 MM HG (ref 80–105)
PO2 BLDA: 136 MM HG (ref 80–105)
PO2 BLDA: 146 MM HG (ref 80–105)
PO2 BLDA: 153 MM HG (ref 80–105)
PO2 BLDA: 160 MM HG (ref 80–105)
PO2 BLDA: 177 MM HG (ref 80–105)
PO2 BLDA: 182 MM HG (ref 80–105)
PO2 BLDA: 193 MM HG (ref 80–105)
PO2 BLDA: 202 MM HG (ref 80–105)
PO2 BLDA: 22 MM HG (ref 80–105)
PO2 BLDA: 245 MM HG (ref 80–105)
PO2 BLDA: 457 MM HG (ref 80–105)
PO2 BLDA: 63 MM HG (ref 80–105)
PO2 BLDA: 68 MM HG (ref 80–105)
PO2 BLDA: 80 MM HG (ref 80–105)
PO2 BLDA: 83 MM HG (ref 80–105)
PO2 BLDV: 35 MM HG (ref 25–47)
PO2 BLDV: 35 MM HG (ref 25–47)
PO2 BLDV: 37 MM HG (ref 25–47)
PO2 BLDV: 39 MM HG (ref 25–47)
PO2 BLDV: 41 MM HG (ref 25–47)
PO2 BLDV: 42 MM HG (ref 25–47)
PO2 BLDV: 45 MM HG (ref 25–47)
PO2 BLDV: 45 MM HG (ref 25–47)
PO2 BLDV: 47 MM HG (ref 25–47)
PO2 BLDV: 49 MM HG (ref 25–47)
PO2 BLDV: 50 MM HG (ref 25–47)
POTASSIUM BLD-SCNC: 3.4 MMOL/L (ref 3.4–5.3)
POTASSIUM BLD-SCNC: 5.7 MMOL/L (ref 3.4–5.3)
POTASSIUM SERPL-SCNC: 3.3 MMOL/L (ref 3.4–5.3)
POTASSIUM SERPL-SCNC: 3.4 MMOL/L (ref 3.4–5.3)
POTASSIUM SERPL-SCNC: 3.5 MMOL/L (ref 3.4–5.3)
POTASSIUM SERPL-SCNC: 3.5 MMOL/L (ref 3.4–5.3)
POTASSIUM SERPL-SCNC: 3.6 MMOL/L (ref 3.4–5.3)
POTASSIUM SERPL-SCNC: 3.6 MMOL/L (ref 3.4–5.3)
POTASSIUM SERPL-SCNC: 3.8 MMOL/L (ref 3.4–5.3)
POTASSIUM SERPL-SCNC: 3.8 MMOL/L (ref 3.4–5.3)
POTASSIUM SERPL-SCNC: 4.1 MMOL/L (ref 3.4–5.3)
POTASSIUM SERPL-SCNC: 4.3 MMOL/L (ref 3.4–5.3)
POTASSIUM SERPL-SCNC: 4.3 MMOL/L (ref 3.4–5.3)
POTASSIUM SERPL-SCNC: 4.5 MMOL/L (ref 3.4–5.3)
POTASSIUM SERPL-SCNC: 4.7 MMOL/L (ref 3.4–5.3)
POTASSIUM SERPL-SCNC: 4.7 MMOL/L (ref 3.4–5.3)
POTASSIUM SERPL-SCNC: 4.9 MMOL/L (ref 3.4–5.3)
POTASSIUM SERPL-SCNC: 5 MMOL/L (ref 3.4–5.3)
PR INTERVAL - MUSE: NORMAL MS
PROCALCITONIN SERPL IA-MCNC: 0.56 NG/ML
PROCALCITONIN SERPL IA-MCNC: 42.8 NG/ML
PROT SERPL-MCNC: 3.7 G/DL (ref 6.4–8.3)
PROT SERPL-MCNC: 3.8 G/DL (ref 6.4–8.3)
PROT SERPL-MCNC: 3.9 G/DL (ref 6.4–8.3)
PROT SERPL-MCNC: 4 G/DL (ref 6.4–8.3)
PROT SERPL-MCNC: 4.1 G/DL (ref 6.4–8.3)
PROT SERPL-MCNC: 4.2 G/DL (ref 6.4–8.3)
PROT SERPL-MCNC: 4.3 G/DL (ref 6.4–8.3)
PROT SERPL-MCNC: 4.5 G/DL (ref 6.4–8.3)
QRS DURATION - MUSE: 166 MS
QRS DURATION - MUSE: 180 MS
QRS DURATION - MUSE: 192 MS
QRS DURATION - MUSE: 192 MS
QRS DURATION - MUSE: 218 MS
QRS DURATION - MUSE: 232 MS
QT - MUSE: 426 MS
QT - MUSE: 458 MS
QT - MUSE: 476 MS
QT - MUSE: 486 MS
QT - MUSE: 508 MS
QT - MUSE: 530 MS
QTC - MUSE: 501 MS
QTC - MUSE: 509 MS
QTC - MUSE: 528 MS
QTC - MUSE: 530 MS
QTC - MUSE: 564 MS
QTC - MUSE: 596 MS
R AXIS - MUSE: -53 DEGREES
R AXIS - MUSE: 109 DEGREES
R AXIS - MUSE: 165 DEGREES
R AXIS - MUSE: 180 DEGREES
R AXIS - MUSE: 227 DEGREES
R AXIS - MUSE: 247 DEGREES
RADIOLOGIST FLAGS: ABNORMAL
RBC # BLD AUTO: 2.29 10E6/UL (ref 4.4–5.9)
RBC # BLD AUTO: 2.38 10E6/UL (ref 4.4–5.9)
RBC # BLD AUTO: 2.45 10E6/UL (ref 4.4–5.9)
RBC # BLD AUTO: 2.47 10E6/UL (ref 4.4–5.9)
RBC # BLD AUTO: 2.47 10E6/UL (ref 4.4–5.9)
RBC # BLD AUTO: 2.51 10E6/UL (ref 4.4–5.9)
RBC # BLD AUTO: 2.54 10E6/UL (ref 4.4–5.9)
RBC # BLD AUTO: 2.54 10E6/UL (ref 4.4–5.9)
RBC # BLD AUTO: 2.58 10E6/UL (ref 4.4–5.9)
RBC # BLD AUTO: 2.59 10E6/UL (ref 4.4–5.9)
RBC # BLD AUTO: 2.6 10E6/UL (ref 4.4–5.9)
RBC # BLD AUTO: 2.62 10E6/UL (ref 4.4–5.9)
RBC # BLD AUTO: 2.65 10E6/UL (ref 4.4–5.9)
RBC # BLD AUTO: 2.69 10E6/UL (ref 4.4–5.9)
RBC # BLD AUTO: 2.7 10E6/UL (ref 4.4–5.9)
RBC # BLD AUTO: 2.71 10E6/UL (ref 4.4–5.9)
RBC # BLD AUTO: 2.73 10E6/UL (ref 4.4–5.9)
RBC # BLD AUTO: 2.75 10E6/UL (ref 3.8–5.9)
RBC # BLD AUTO: 2.77 10E6/UL (ref 4.4–5.9)
RBC # BLD AUTO: 2.81 10E6/UL (ref 4.4–5.9)
SA TARGET DNA: NEGATIVE
SAO2 % BLDA: 100 % (ref 95–96)
SAO2 % BLDA: 93 % (ref 92–100)
SAO2 % BLDA: 93 % (ref 92–100)
SAO2 % BLDA: 93.8 % (ref 95–96)
SAO2 % BLDA: 94 % (ref 92–100)
SAO2 % BLDA: 94 % (ref 95–96)
SAO2 % BLDA: 94.9 % (ref 95–96)
SAO2 % BLDA: 95 % (ref 92–100)
SAO2 % BLDA: 96 % (ref 92–100)
SAO2 % BLDA: 97 % (ref 92–100)
SAO2 % BLDA: 97.6 % (ref 95–96)
SAO2 % BLDA: 98 % (ref 92–100)
SAO2 % BLDA: 99 % (ref 92–100)
SAO2 % BLDA: 99 % (ref 95–96)
SAO2 % BLDA: 99.3 % (ref 95–96)
SAO2 % BLDA: 99.9 % (ref 95–96)
SAO2 % BLDA: >100 % (ref 95–96)
SAO2 % BLDV: 69.7 % (ref 70–75)
SAO2 % BLDV: 69.8 % (ref 70–75)
SAO2 % BLDV: 70 % (ref 70–75)
SAO2 % BLDV: 70.2 % (ref 70–75)
SAO2 % BLDV: 75.2 % (ref 70–75)
SAO2 % BLDV: 78.5 % (ref 70–75)
SAO2 % BLDV: 78.8 % (ref 70–75)
SAO2 % BLDV: 79.9 % (ref 70–75)
SAO2 % BLDV: 80.7 % (ref 70–75)
SAO2 % BLDV: 81.5 % (ref 70–75)
SAO2 % BLDV: 83.1 % (ref 70–75)
SODIUM BLD-SCNC: 137 MMOL/L (ref 135–145)
SODIUM BLD-SCNC: 141 MMOL/L (ref 135–145)
SODIUM SERPL-SCNC: 136 MMOL/L (ref 135–145)
SODIUM SERPL-SCNC: 137 MMOL/L (ref 135–145)
SODIUM SERPL-SCNC: 138 MMOL/L (ref 135–145)
SODIUM SERPL-SCNC: 139 MMOL/L (ref 135–145)
SODIUM SERPL-SCNC: 139 MMOL/L (ref 135–145)
SODIUM SERPL-SCNC: 140 MMOL/L (ref 135–145)
SODIUM SERPL-SCNC: 141 MMOL/L (ref 135–145)
SODIUM SERPL-SCNC: 142 MMOL/L (ref 135–145)
SODIUM SERPL-SCNC: 143 MMOL/L (ref 135–145)
SODIUM SERPL-SCNC: 143 MMOL/L (ref 135–145)
SODIUM SERPL-SCNC: 144 MMOL/L (ref 135–145)
SODIUM SERPL-SCNC: 144 MMOL/L (ref 135–145)
SPECIMEN EXPIRATION DATE: NORMAL
SYSTOLIC BLOOD PRESSURE - MUSE: NORMAL MMHG
T AXIS - MUSE: -16 DEGREES
T AXIS - MUSE: -85 DEGREES
T AXIS - MUSE: 150 DEGREES
T AXIS - MUSE: 34 DEGREES
T AXIS - MUSE: 50 DEGREES
T AXIS - MUSE: 59 DEGREES
T3 SERPL-MCNC: 47 NG/DL (ref 85–202)
T3 SERPL-MCNC: 49 NG/DL (ref 85–202)
T3 SERPL-MCNC: 49 NG/DL (ref 85–202)
T3 SERPL-MCNC: 51 NG/DL (ref 85–202)
T3 SERPL-MCNC: 58 NG/DL (ref 85–202)
T3 SERPL-MCNC: 69 NG/DL (ref 85–202)
T3FREE SERPL-MCNC: 1 PG/ML (ref 2–4.4)
T3FREE SERPL-MCNC: 1.1 PG/ML (ref 2–4.4)
T3FREE SERPL-MCNC: 1.4 PG/ML (ref 2–4.4)
T3FREE SERPL-MCNC: 1.7 PG/ML (ref 2–4.4)
T4 FREE SERPL-MCNC: 0.5 NG/DL (ref 0.9–1.7)
T4 FREE SERPL-MCNC: 0.54 NG/DL (ref 0.9–1.7)
T4 FREE SERPL-MCNC: 0.58 NG/DL (ref 0.9–1.7)
T4 FREE SERPL-MCNC: 0.61 NG/DL (ref 0.9–1.7)
T4 FREE SERPL-MCNC: 0.77 NG/DL (ref 0.9–1.7)
T4 FREE SERPL-MCNC: 0.84 NG/DL (ref 0.9–1.7)
TROPONIN T SERPL HS-MCNC: 3958 NG/L
TROPONIN T SERPL HS-MCNC: 4079 NG/L
TROPONIN T SERPL HS-MCNC: 4225 NG/L
TROPONIN T SERPL HS-MCNC: 4229 NG/L
TROPONIN T SERPL HS-MCNC: 4463 NG/L
TROPONIN T SERPL HS-MCNC: 4502 NG/L
TROPONIN T SERPL HS-MCNC: 4824 NG/L
TROPONIN T SERPL HS-MCNC: 5312 NG/L
TROPONIN T SERPL HS-MCNC: 6386 NG/L
TROPONIN T SERPL HS-MCNC: 6444 NG/L
TROPONIN T SERPL HS-MCNC: ABNORMAL NG/L
TSH SERPL DL<=0.005 MIU/L-ACNC: 0.2 UIU/ML (ref 0.3–4.2)
TSH SERPL DL<=0.005 MIU/L-ACNC: 0.22 UIU/ML (ref 0.3–4.2)
TSH SERPL DL<=0.005 MIU/L-ACNC: 0.45 UIU/ML (ref 0.3–4.2)
TSH SERPL DL<=0.005 MIU/L-ACNC: 0.8 UIU/ML (ref 0.3–4.2)
TSH SERPL DL<=0.005 MIU/L-ACNC: 1.68 UIU/ML (ref 0.3–4.2)
TSH SERPL DL<=0.005 MIU/L-ACNC: 2.27 UIU/ML (ref 0.3–4.2)
UFH PPP CHRO-ACNC: 0.12 IU/ML
UFH PPP CHRO-ACNC: 0.15 IU/ML
UFH PPP CHRO-ACNC: 0.22 IU/ML
UFH PPP CHRO-ACNC: 0.28 IU/ML
UFH PPP CHRO-ACNC: 0.34 IU/ML
UFH PPP CHRO-ACNC: 0.38 IU/ML
UFH PPP CHRO-ACNC: 0.46 IU/ML
UFH PPP CHRO-ACNC: 0.51 IU/ML
UFH PPP CHRO-ACNC: 0.66 IU/ML
UFH PPP CHRO-ACNC: 0.73 IU/ML
UFH PPP CHRO-ACNC: 0.77 IU/ML
UFH PPP CHRO-ACNC: 0.85 IU/ML
UFH PPP CHRO-ACNC: 1.09 IU/ML
UFH PPP CHRO-ACNC: <0.1 IU/ML
UFH PPP CHRO-ACNC: >1.1 IU/ML
UNIT ABO/RH: NORMAL
UNIT NUMBER: NORMAL
UNIT STATUS: NORMAL
UNIT TYPE ISBT: 6200
VANCOMYCIN SERPL-MCNC: 15.3 UG/ML
VENTRICULAR RATE- MUSE: 60 BPM
VENTRICULAR RATE- MUSE: 72 BPM
VENTRICULAR RATE- MUSE: 74 BPM
VENTRICULAR RATE- MUSE: 81 BPM
VENTRICULAR RATE- MUSE: 83 BPM
VENTRICULAR RATE- MUSE: 86 BPM
WBC # BLD AUTO: 16.2 10E3/UL (ref 4–11)
WBC # BLD AUTO: 16.7 10E3/UL (ref 4–11)
WBC # BLD AUTO: 16.9 10E3/UL (ref 4–11)
WBC # BLD AUTO: 16.9 10E3/UL (ref 4–11)
WBC # BLD AUTO: 17 10E3/UL (ref 4–11)
WBC # BLD AUTO: 17.4 10E3/UL (ref 4–11)
WBC # BLD AUTO: 17.6 10E3/UL (ref 4–11)
WBC # BLD AUTO: 17.9 10E3/UL (ref 4–11)
WBC # BLD AUTO: 18.3 10E3/UL (ref 4–11)
WBC # BLD AUTO: 18.9 10E3/UL (ref 4–11)
WBC # BLD AUTO: 20.1 10E3/UL (ref 4–11)
WBC # BLD AUTO: 20.4 10E3/UL (ref 4–11)
WBC # BLD AUTO: 20.7 10E3/UL (ref 4–11)
WBC # BLD AUTO: 21.7 10E3/UL (ref 4–11)
WBC # BLD AUTO: 23 10E3/UL (ref 4–11)
WBC # BLD AUTO: 23.6 10E3/UL (ref 4–11)
WBC # BLD AUTO: 25.1 10E3/UL (ref 4–11)
WBC # BLD AUTO: 26.3 10E3/UL (ref 4–11)
WBC # BLD AUTO: 27.1 10E3/UL (ref 4–11)
WBC # BLD AUTO: 27.6 10E3/UL (ref 4–11)

## 2024-01-01 PROCEDURE — P9016 RBC LEUKOCYTES REDUCED: HCPCS | Performed by: INTERNAL MEDICINE

## 2024-01-01 PROCEDURE — 83615 LACTATE (LD) (LDH) ENZYME: CPT

## 2024-01-01 PROCEDURE — 80053 COMPREHEN METABOLIC PANEL: CPT | Performed by: INTERNAL MEDICINE

## 2024-01-01 PROCEDURE — 71045 X-RAY EXAM CHEST 1 VIEW: CPT | Mod: 26 | Performed by: RADIOLOGY

## 2024-01-01 PROCEDURE — 80171 DRUG SCREEN QUANT GABAPENTIN: CPT | Performed by: STUDENT IN AN ORGANIZED HEALTH CARE EDUCATION/TRAINING PROGRAM

## 2024-01-01 PROCEDURE — 258N000003 HC RX IP 258 OP 636

## 2024-01-01 PROCEDURE — 250N000011 HC RX IP 250 OP 636: Mod: JZ | Performed by: INTERNAL MEDICINE

## 2024-01-01 PROCEDURE — 250N000013 HC RX MED GY IP 250 OP 250 PS 637

## 2024-01-01 PROCEDURE — 86316 IMMUNOASSAY TUMOR OTHER: CPT | Performed by: STUDENT IN AN ORGANIZED HEALTH CARE EDUCATION/TRAINING PROGRAM

## 2024-01-01 PROCEDURE — 85396 CLOTTING ASSAY WHOLE BLOOD: CPT | Performed by: STUDENT IN AN ORGANIZED HEALTH CARE EDUCATION/TRAINING PROGRAM

## 2024-01-01 PROCEDURE — 70498 CT ANGIOGRAPHY NECK: CPT | Mod: 26 | Performed by: STUDENT IN AN ORGANIZED HEALTH CARE EDUCATION/TRAINING PROGRAM

## 2024-01-01 PROCEDURE — 84439 ASSAY OF FREE THYROXINE: CPT

## 2024-01-01 PROCEDURE — 83051 HEMOGLOBIN PLASMA: CPT

## 2024-01-01 PROCEDURE — 82330 ASSAY OF CALCIUM: CPT

## 2024-01-01 PROCEDURE — 82805 BLOOD GASES W/O2 SATURATION: CPT | Performed by: STUDENT IN AN ORGANIZED HEALTH CARE EDUCATION/TRAINING PROGRAM

## 2024-01-01 PROCEDURE — 82805 BLOOD GASES W/O2 SATURATION: CPT

## 2024-01-01 PROCEDURE — 250N000011 HC RX IP 250 OP 636

## 2024-01-01 PROCEDURE — 84484 ASSAY OF TROPONIN QUANT: CPT | Performed by: INTERNAL MEDICINE

## 2024-01-01 PROCEDURE — 250N000011 HC RX IP 250 OP 636: Mod: JZ | Performed by: NURSE PRACTITIONER

## 2024-01-01 PROCEDURE — 85520 HEPARIN ASSAY: CPT

## 2024-01-01 PROCEDURE — 85300 ANTITHROMBIN III ACTIVITY: CPT

## 2024-01-01 PROCEDURE — 84155 ASSAY OF PROTEIN SERUM: CPT

## 2024-01-01 PROCEDURE — 87040 BLOOD CULTURE FOR BACTERIA: CPT

## 2024-01-01 PROCEDURE — 999N000185 HC STATISTIC TRANSPORT TIME EA 15 MIN

## 2024-01-01 PROCEDURE — 93005 ELECTROCARDIOGRAM TRACING: CPT

## 2024-01-01 PROCEDURE — 71045 X-RAY EXAM CHEST 1 VIEW: CPT

## 2024-01-01 PROCEDURE — 70496 CT ANGIOGRAPHY HEAD: CPT

## 2024-01-01 PROCEDURE — 93455 CORONARY ART/GRFT ANGIO S&I: CPT | Mod: 26 | Performed by: INTERNAL MEDICINE

## 2024-01-01 PROCEDURE — 200N000002 HC R&B ICU UMMC

## 2024-01-01 PROCEDURE — 85379 FIBRIN DEGRADATION QUANT: CPT

## 2024-01-01 PROCEDURE — P9037 PLATE PHERES LEUKOREDU IRRAD: HCPCS | Performed by: INTERNAL MEDICINE

## 2024-01-01 PROCEDURE — 85610 PROTHROMBIN TIME: CPT

## 2024-01-01 PROCEDURE — 85027 COMPLETE CBC AUTOMATED: CPT

## 2024-01-01 PROCEDURE — 80053 COMPREHEN METABOLIC PANEL: CPT

## 2024-01-01 PROCEDURE — 85347 COAGULATION TIME ACTIVATED: CPT

## 2024-01-01 PROCEDURE — 272N000057 HC CATH BALLOON IABP

## 2024-01-01 PROCEDURE — 84481 FREE ASSAY (FT-3): CPT | Performed by: STUDENT IN AN ORGANIZED HEALTH CARE EDUCATION/TRAINING PROGRAM

## 2024-01-01 PROCEDURE — 33949 ECMO/ECLS DAILY MGMT ARTERY: CPT | Performed by: INTERNAL MEDICINE

## 2024-01-01 PROCEDURE — 86900 BLOOD TYPING SEROLOGIC ABO: CPT | Performed by: STUDENT IN AN ORGANIZED HEALTH CARE EDUCATION/TRAINING PROGRAM

## 2024-01-01 PROCEDURE — 250N000009 HC RX 250: Performed by: NURSE PRACTITIONER

## 2024-01-01 PROCEDURE — 250N000009 HC RX 250

## 2024-01-01 PROCEDURE — 84450 TRANSFERASE (AST) (SGOT): CPT

## 2024-01-01 PROCEDURE — 82947 ASSAY GLUCOSE BLOOD QUANT: CPT

## 2024-01-01 PROCEDURE — 82247 BILIRUBIN TOTAL: CPT

## 2024-01-01 PROCEDURE — 250N000013 HC RX MED GY IP 250 OP 250 PS 637: Performed by: STUDENT IN AN ORGANIZED HEALTH CARE EDUCATION/TRAINING PROGRAM

## 2024-01-01 PROCEDURE — 85652 RBC SED RATE AUTOMATED: CPT | Performed by: STUDENT IN AN ORGANIZED HEALTH CARE EDUCATION/TRAINING PROGRAM

## 2024-01-01 PROCEDURE — 85025 COMPLETE CBC W/AUTO DIFF WBC: CPT | Performed by: STUDENT IN AN ORGANIZED HEALTH CARE EDUCATION/TRAINING PROGRAM

## 2024-01-01 PROCEDURE — 83735 ASSAY OF MAGNESIUM: CPT

## 2024-01-01 PROCEDURE — 80048 BASIC METABOLIC PNL TOTAL CA: CPT

## 2024-01-01 PROCEDURE — 83605 ASSAY OF LACTIC ACID: CPT | Performed by: STUDENT IN AN ORGANIZED HEALTH CARE EDUCATION/TRAINING PROGRAM

## 2024-01-01 PROCEDURE — 85027 COMPLETE CBC AUTOMATED: CPT | Performed by: STUDENT IN AN ORGANIZED HEALTH CARE EDUCATION/TRAINING PROGRAM

## 2024-01-01 PROCEDURE — 87106 FUNGI IDENTIFICATION YEAST: CPT

## 2024-01-01 PROCEDURE — 83615 LACTATE (LD) (LDH) ENZYME: CPT | Performed by: STUDENT IN AN ORGANIZED HEALTH CARE EDUCATION/TRAINING PROGRAM

## 2024-01-01 PROCEDURE — 250N000009 HC RX 250: Performed by: STUDENT IN AN ORGANIZED HEALTH CARE EDUCATION/TRAINING PROGRAM

## 2024-01-01 PROCEDURE — 82533 TOTAL CORTISOL: CPT | Performed by: STUDENT IN AN ORGANIZED HEALTH CARE EDUCATION/TRAINING PROGRAM

## 2024-01-01 PROCEDURE — 999N000075 HC STATISTIC IABP MONITORING

## 2024-01-01 PROCEDURE — 85610 PROTHROMBIN TIME: CPT | Performed by: STUDENT IN AN ORGANIZED HEALTH CARE EDUCATION/TRAINING PROGRAM

## 2024-01-01 PROCEDURE — 36415 COLL VENOUS BLD VENIPUNCTURE: CPT

## 2024-01-01 PROCEDURE — 84100 ASSAY OF PHOSPHORUS: CPT | Performed by: CLINICAL NURSE SPECIALIST

## 2024-01-01 PROCEDURE — 83735 ASSAY OF MAGNESIUM: CPT | Performed by: INTERNAL MEDICINE

## 2024-01-01 PROCEDURE — 250N000011 HC RX IP 250 OP 636: Mod: JZ | Performed by: STUDENT IN AN ORGANIZED HEALTH CARE EDUCATION/TRAINING PROGRAM

## 2024-01-01 PROCEDURE — 250N000009 HC RX 250: Performed by: INTERNAL MEDICINE

## 2024-01-01 PROCEDURE — 99291 CRITICAL CARE FIRST HOUR: CPT | Mod: FS

## 2024-01-01 PROCEDURE — 82550 ASSAY OF CK (CPK): CPT | Performed by: NURSE PRACTITIONER

## 2024-01-01 PROCEDURE — 99291 CRITICAL CARE FIRST HOUR: CPT | Mod: 25 | Performed by: NURSE PRACTITIONER

## 2024-01-01 PROCEDURE — 87205 SMEAR GRAM STAIN: CPT

## 2024-01-01 PROCEDURE — 95714 VEEG EA 12-26 HR UNMNTR: CPT

## 2024-01-01 PROCEDURE — 3E043XZ INTRODUCTION OF VASOPRESSOR INTO CENTRAL VEIN, PERCUTANEOUS APPROACH: ICD-10-PCS | Performed by: INTERNAL MEDICINE

## 2024-01-01 PROCEDURE — 85730 THROMBOPLASTIN TIME PARTIAL: CPT

## 2024-01-01 PROCEDURE — B2111ZZ FLUOROSCOPY OF MULTIPLE CORONARY ARTERIES USING LOW OSMOLAR CONTRAST: ICD-10-PCS | Performed by: INTERNAL MEDICINE

## 2024-01-01 PROCEDURE — 70450 CT HEAD/BRAIN W/O DYE: CPT | Mod: 26 | Performed by: RADIOLOGY

## 2024-01-01 PROCEDURE — 272N000053 HC CANNULA, ARTERIAL FEMORAL

## 2024-01-01 PROCEDURE — 70450 CT HEAD/BRAIN W/O DYE: CPT

## 2024-01-01 PROCEDURE — 94003 VENT MGMT INPAT SUBQ DAY: CPT

## 2024-01-01 PROCEDURE — 84484 ASSAY OF TROPONIN QUANT: CPT | Performed by: STUDENT IN AN ORGANIZED HEALTH CARE EDUCATION/TRAINING PROGRAM

## 2024-01-01 PROCEDURE — 84100 ASSAY OF PHOSPHORUS: CPT | Performed by: INTERNAL MEDICINE

## 2024-01-01 PROCEDURE — 85384 FIBRINOGEN ACTIVITY: CPT

## 2024-01-01 PROCEDURE — 84480 ASSAY TRIIODOTHYRONINE (T3): CPT

## 2024-01-01 PROCEDURE — 33949 ECMO/ECLS DAILY MGMT ARTERY: CPT

## 2024-01-01 PROCEDURE — 999N000157 HC STATISTIC RCP TIME EA 10 MIN

## 2024-01-01 PROCEDURE — 82805 BLOOD GASES W/O2 SATURATION: CPT | Performed by: INTERNAL MEDICINE

## 2024-01-01 PROCEDURE — 272N000555 HC SENSOR NIRS OXIMETER, ADULT

## 2024-01-01 PROCEDURE — 83735 ASSAY OF MAGNESIUM: CPT | Performed by: CLINICAL NURSE SPECIALIST

## 2024-01-01 PROCEDURE — 90947 DIALYSIS REPEATED EVAL: CPT

## 2024-01-01 PROCEDURE — 2894A VOIDCORRECT: CPT | Performed by: INTERNAL MEDICINE

## 2024-01-01 PROCEDURE — 87186 SC STD MICRODIL/AGAR DIL: CPT

## 2024-01-01 PROCEDURE — 258N000001 HC RX 258: Performed by: NURSE PRACTITIONER

## 2024-01-01 PROCEDURE — 85730 THROMBOPLASTIN TIME PARTIAL: CPT | Performed by: STUDENT IN AN ORGANIZED HEALTH CARE EDUCATION/TRAINING PROGRAM

## 2024-01-01 PROCEDURE — 71250 CT THORAX DX C-: CPT

## 2024-01-01 PROCEDURE — 83605 ASSAY OF LACTIC ACID: CPT | Performed by: INTERNAL MEDICINE

## 2024-01-01 PROCEDURE — C9113 INJ PANTOPRAZOLE SODIUM, VIA: HCPCS | Mod: JZ | Performed by: NURSE PRACTITIONER

## 2024-01-01 PROCEDURE — 82040 ASSAY OF SERUM ALBUMIN: CPT | Performed by: INTERNAL MEDICINE

## 2024-01-01 PROCEDURE — 85652 RBC SED RATE AUTOMATED: CPT | Performed by: INTERNAL MEDICINE

## 2024-01-01 PROCEDURE — 258N000001 HC RX 258: Performed by: STUDENT IN AN ORGANIZED HEALTH CARE EDUCATION/TRAINING PROGRAM

## 2024-01-01 PROCEDURE — 93925 LOWER EXTREMITY STUDY: CPT | Mod: 26 | Performed by: RADIOLOGY

## 2024-01-01 PROCEDURE — 94002 VENT MGMT INPAT INIT DAY: CPT

## 2024-01-01 PROCEDURE — 36556 INSERT NON-TUNNEL CV CATH: CPT | Mod: XU | Performed by: INTERNAL MEDICINE

## 2024-01-01 PROCEDURE — 84100 ASSAY OF PHOSPHORUS: CPT

## 2024-01-01 PROCEDURE — 258N000003 HC RX IP 258 OP 636: Mod: JZ | Performed by: STUDENT IN AN ORGANIZED HEALTH CARE EDUCATION/TRAINING PROGRAM

## 2024-01-01 PROCEDURE — 93455 CORONARY ART/GRFT ANGIO S&I: CPT | Performed by: INTERNAL MEDICINE

## 2024-01-01 PROCEDURE — 272N000237 HC CARDIOHELP CIRCUIT

## 2024-01-01 PROCEDURE — 5A1522G EXTRACORPOREAL OXYGENATION, MEMBRANE, PERIPHERAL VENO-ARTERIAL: ICD-10-PCS | Performed by: INTERNAL MEDICINE

## 2024-01-01 PROCEDURE — 93925 LOWER EXTREMITY STUDY: CPT

## 2024-01-01 PROCEDURE — 84443 ASSAY THYROID STIM HORMONE: CPT | Performed by: INTERNAL MEDICINE

## 2024-01-01 PROCEDURE — 84481 FREE ASSAY (FT-3): CPT

## 2024-01-01 PROCEDURE — 86140 C-REACTIVE PROTEIN: CPT

## 2024-01-01 PROCEDURE — 82330 ASSAY OF CALCIUM: CPT | Performed by: CLINICAL NURSE SPECIALIST

## 2024-01-01 PROCEDURE — 272N000001 HC OR GENERAL SUPPLY STERILE: Performed by: INTERNAL MEDICINE

## 2024-01-01 PROCEDURE — 33952 ECMO/ECLS INSJ PRPH CANNULA: CPT | Mod: GC | Performed by: INTERNAL MEDICINE

## 2024-01-01 PROCEDURE — 33947 ECMO/ECLS INITIATION ARTERY: CPT | Mod: GC | Performed by: INTERNAL MEDICINE

## 2024-01-01 PROCEDURE — 85014 HEMATOCRIT: CPT

## 2024-01-01 PROCEDURE — 74176 CT ABD & PELVIS W/O CONTRAST: CPT | Mod: 26 | Performed by: RADIOLOGY

## 2024-01-01 PROCEDURE — 36556 INSERT NON-TUNNEL CV CATH: CPT | Performed by: INTERNAL MEDICINE

## 2024-01-01 PROCEDURE — 85520 HEPARIN ASSAY: CPT | Performed by: STUDENT IN AN ORGANIZED HEALTH CARE EDUCATION/TRAINING PROGRAM

## 2024-01-01 PROCEDURE — 87205 SMEAR GRAM STAIN: CPT | Performed by: STUDENT IN AN ORGANIZED HEALTH CARE EDUCATION/TRAINING PROGRAM

## 2024-01-01 PROCEDURE — 33967 INSERT I-AORT PERCUT DEVICE: CPT | Performed by: INTERNAL MEDICINE

## 2024-01-01 PROCEDURE — 85379 FIBRIN DEGRADATION QUANT: CPT | Performed by: STUDENT IN AN ORGANIZED HEALTH CARE EDUCATION/TRAINING PROGRAM

## 2024-01-01 PROCEDURE — 86316 IMMUNOASSAY TUMOR OTHER: CPT

## 2024-01-01 PROCEDURE — 95720 EEG PHY/QHP EA INCR W/VEEG: CPT | Performed by: PSYCHIATRY & NEUROLOGY

## 2024-01-01 PROCEDURE — 99418 PROLNG IP/OBS E/M EA 15 MIN: CPT | Performed by: CLINICAL NURSE SPECIALIST

## 2024-01-01 PROCEDURE — 82330 ASSAY OF CALCIUM: CPT | Performed by: INTERNAL MEDICINE

## 2024-01-01 PROCEDURE — 84443 ASSAY THYROID STIM HORMONE: CPT

## 2024-01-01 PROCEDURE — 258N000003 HC RX IP 258 OP 636: Mod: JZ

## 2024-01-01 PROCEDURE — 250N000011 HC RX IP 250 OP 636: Mod: JZ

## 2024-01-01 PROCEDURE — 95720 EEG PHY/QHP EA INCR W/VEEG: CPT | Mod: GC | Performed by: PSYCHIATRY & NEUROLOGY

## 2024-01-01 PROCEDURE — 84443 ASSAY THYROID STIM HORMONE: CPT | Performed by: STUDENT IN AN ORGANIZED HEALTH CARE EDUCATION/TRAINING PROGRAM

## 2024-01-01 PROCEDURE — 95718 EEG PHYS/QHP 2-12 HR W/VEEG: CPT | Performed by: PSYCHIATRY & NEUROLOGY

## 2024-01-01 PROCEDURE — 70450 CT HEAD/BRAIN W/O DYE: CPT | Mod: 26 | Performed by: STUDENT IN AN ORGANIZED HEALTH CARE EDUCATION/TRAINING PROGRAM

## 2024-01-01 PROCEDURE — 83036 HEMOGLOBIN GLYCOSYLATED A1C: CPT | Performed by: STUDENT IN AN ORGANIZED HEALTH CARE EDUCATION/TRAINING PROGRAM

## 2024-01-01 PROCEDURE — 83735 ASSAY OF MAGNESIUM: CPT | Performed by: STUDENT IN AN ORGANIZED HEALTH CARE EDUCATION/TRAINING PROGRAM

## 2024-01-01 PROCEDURE — C1769 GUIDE WIRE: HCPCS | Performed by: INTERNAL MEDICINE

## 2024-01-01 PROCEDURE — C1894 INTRO/SHEATH, NON-LASER: HCPCS | Performed by: INTERNAL MEDICINE

## 2024-01-01 PROCEDURE — 80307 DRUG TEST PRSMV CHEM ANLYZR: CPT | Performed by: STUDENT IN AN ORGANIZED HEALTH CARE EDUCATION/TRAINING PROGRAM

## 2024-01-01 PROCEDURE — 86900 BLOOD TYPING SEROLOGIC ABO: CPT | Performed by: INTERNAL MEDICINE

## 2024-01-01 PROCEDURE — 84145 PROCALCITONIN (PCT): CPT

## 2024-01-01 PROCEDURE — 95711 VEEG 2-12 HR UNMONITORED: CPT

## 2024-01-01 PROCEDURE — 370N000003 HC ANESTHESIA WARD SERVICE: Performed by: ANESTHESIOLOGY

## 2024-01-01 PROCEDURE — 99233 SBSQ HOSP IP/OBS HIGH 50: CPT | Mod: FS | Performed by: CLINICAL NURSE SPECIALIST

## 2024-01-01 PROCEDURE — 99292 CRITICAL CARE ADDL 30 MIN: CPT | Mod: 25 | Performed by: INTERNAL MEDICINE

## 2024-01-01 PROCEDURE — 93306 TTE W/DOPPLER COMPLETE: CPT

## 2024-01-01 PROCEDURE — 84155 ASSAY OF PROTEIN SERUM: CPT | Performed by: STUDENT IN AN ORGANIZED HEALTH CARE EDUCATION/TRAINING PROGRAM

## 2024-01-01 PROCEDURE — 33952 ECMO/ECLS INSJ PRPH CANNULA: CPT | Performed by: INTERNAL MEDICINE

## 2024-01-01 PROCEDURE — 5A1955Z RESPIRATORY VENTILATION, GREATER THAN 96 CONSECUTIVE HOURS: ICD-10-PCS | Performed by: INTERNAL MEDICINE

## 2024-01-01 PROCEDURE — 99233 SBSQ HOSP IP/OBS HIGH 50: CPT | Performed by: INTERNAL MEDICINE

## 2024-01-01 PROCEDURE — 93280 PM DEVICE PROGR EVAL DUAL: CPT

## 2024-01-01 PROCEDURE — 86140 C-REACTIVE PROTEIN: CPT | Performed by: STUDENT IN AN ORGANIZED HEALTH CARE EDUCATION/TRAINING PROGRAM

## 2024-01-01 PROCEDURE — 86923 COMPATIBILITY TEST ELECTRIC: CPT | Performed by: INTERNAL MEDICINE

## 2024-01-01 PROCEDURE — 84439 ASSAY OF FREE THYROXINE: CPT | Performed by: STUDENT IN AN ORGANIZED HEALTH CARE EDUCATION/TRAINING PROGRAM

## 2024-01-01 PROCEDURE — 70496 CT ANGIOGRAPHY HEAD: CPT | Mod: 26 | Performed by: STUDENT IN AN ORGANIZED HEALTH CARE EDUCATION/TRAINING PROGRAM

## 2024-01-01 PROCEDURE — 84100 ASSAY OF PHOSPHORUS: CPT | Performed by: STUDENT IN AN ORGANIZED HEALTH CARE EDUCATION/TRAINING PROGRAM

## 2024-01-01 PROCEDURE — 272N000232 HC CANNULA, VENOUS FEMORAL, ADULT

## 2024-01-01 PROCEDURE — 93880 EXTRACRANIAL BILAT STUDY: CPT

## 2024-01-01 PROCEDURE — 93306 TTE W/DOPPLER COMPLETE: CPT | Mod: 26 | Performed by: INTERNAL MEDICINE

## 2024-01-01 PROCEDURE — 999N000127 HC STATISTIC PERIPHERAL IV START W US GUIDANCE

## 2024-01-01 PROCEDURE — 80202 ASSAY OF VANCOMYCIN: CPT | Performed by: INTERNAL MEDICINE

## 2024-01-01 PROCEDURE — 250N000009 HC RX 250: Performed by: CLINICAL NURSE SPECIALIST

## 2024-01-01 PROCEDURE — 999N000065 XR CHEST PORT 1 VIEW

## 2024-01-01 PROCEDURE — 85027 COMPLETE CBC AUTOMATED: CPT | Performed by: INTERNAL MEDICINE

## 2024-01-01 PROCEDURE — 99253 IP/OBS CNSLTJ NEW/EST LOW 45: CPT | Performed by: PHYSICIAN ASSISTANT

## 2024-01-01 PROCEDURE — 258N000003 HC RX IP 258 OP 636: Mod: JZ | Performed by: INTERNAL MEDICINE

## 2024-01-01 PROCEDURE — 82330 ASSAY OF CALCIUM: CPT | Performed by: STUDENT IN AN ORGANIZED HEALTH CARE EDUCATION/TRAINING PROGRAM

## 2024-01-01 PROCEDURE — 5A02210 ASSISTANCE WITH CARDIAC OUTPUT USING BALLOON PUMP, CONTINUOUS: ICD-10-PCS | Performed by: INTERNAL MEDICINE

## 2024-01-01 PROCEDURE — 93280 PM DEVICE PROGR EVAL DUAL: CPT | Mod: 26 | Performed by: INTERNAL MEDICINE

## 2024-01-01 PROCEDURE — 84480 ASSAY TRIIODOTHYRONINE (T3): CPT | Performed by: STUDENT IN AN ORGANIZED HEALTH CARE EDUCATION/TRAINING PROGRAM

## 2024-01-01 PROCEDURE — 33947 ECMO/ECLS INITIATION ARTERY: CPT

## 2024-01-01 PROCEDURE — 83051 HEMOGLOBIN PLASMA: CPT | Performed by: STUDENT IN AN ORGANIZED HEALTH CARE EDUCATION/TRAINING PROGRAM

## 2024-01-01 PROCEDURE — 71250 CT THORAX DX C-: CPT | Mod: 26 | Performed by: RADIOLOGY

## 2024-01-01 PROCEDURE — 99232 SBSQ HOSP IP/OBS MODERATE 35: CPT | Mod: FS | Performed by: CLINICAL NURSE SPECIALIST

## 2024-01-01 PROCEDURE — C1751 CATH, INF, PER/CENT/MIDLINE: HCPCS | Performed by: INTERNAL MEDICINE

## 2024-01-01 PROCEDURE — C1887 CATHETER, GUIDING: HCPCS | Performed by: INTERNAL MEDICINE

## 2024-01-01 PROCEDURE — 33967 INSERT I-AORT PERCUT DEVICE: CPT | Mod: GC | Performed by: INTERNAL MEDICINE

## 2024-01-01 PROCEDURE — 82248 BILIRUBIN DIRECT: CPT | Performed by: INTERNAL MEDICINE

## 2024-01-01 PROCEDURE — P9016 RBC LEUKOCYTES REDUCED: HCPCS

## 2024-01-01 PROCEDURE — 87641 MR-STAPH DNA AMP PROBE: CPT

## 2024-01-01 PROCEDURE — 5A1D90Z PERFORMANCE OF URINARY FILTRATION, CONTINUOUS, GREATER THAN 18 HOURS PER DAY: ICD-10-PCS | Performed by: INTERNAL MEDICINE

## 2024-01-01 PROCEDURE — 99291 CRITICAL CARE FIRST HOUR: CPT | Mod: 25 | Performed by: INTERNAL MEDICINE

## 2024-01-01 PROCEDURE — 82947 ASSAY GLUCOSE BLOOD QUANT: CPT | Performed by: STUDENT IN AN ORGANIZED HEALTH CARE EDUCATION/TRAINING PROGRAM

## 2024-01-01 PROCEDURE — C9113 INJ PANTOPRAZOLE SODIUM, VIA: HCPCS | Mod: JZ | Performed by: STUDENT IN AN ORGANIZED HEALTH CARE EDUCATION/TRAINING PROGRAM

## 2024-01-01 PROCEDURE — 36620 INSERTION CATHETER ARTERY: CPT | Mod: XU | Performed by: INTERNAL MEDICINE

## 2024-01-01 PROCEDURE — 36415 COLL VENOUS BLD VENIPUNCTURE: CPT | Performed by: INTERNAL MEDICINE

## 2024-01-01 PROCEDURE — 84145 PROCALCITONIN (PCT): CPT | Performed by: STUDENT IN AN ORGANIZED HEALTH CARE EDUCATION/TRAINING PROGRAM

## 2024-01-01 PROCEDURE — 999N000077 HC STATISTIC INSERT IABP

## 2024-01-01 PROCEDURE — 410N000003 HC PER-PERFUSION 1ST 30 MIN: Performed by: INTERNAL MEDICINE

## 2024-01-01 PROCEDURE — G0463 HOSPITAL OUTPT CLINIC VISIT: HCPCS | Mod: 25

## 2024-01-01 PROCEDURE — 93880 EXTRACRANIAL BILAT STUDY: CPT | Mod: 26 | Performed by: RADIOLOGY

## 2024-01-01 PROCEDURE — 99233 SBSQ HOSP IP/OBS HIGH 50: CPT | Performed by: CLINICAL NURSE SPECIALIST

## 2024-01-01 DEVICE — CATH CENTRAL LINE MULTI LUMEN 7FRX20CM CDC-45703-XP1A: Type: IMPLANTABLE DEVICE | Status: FUNCTIONAL

## 2024-01-01 RX ORDER — IOPAMIDOL 755 MG/ML
67 INJECTION, SOLUTION INTRAVASCULAR ONCE
Status: COMPLETED | OUTPATIENT
Start: 2024-01-01 | End: 2024-01-01

## 2024-01-01 RX ORDER — GLYCOPYRROLATE 0.2 MG/ML
0.2 INJECTION, SOLUTION INTRAMUSCULAR; INTRAVENOUS ONCE
Status: COMPLETED | OUTPATIENT
Start: 2024-01-01 | End: 2024-01-01

## 2024-01-01 RX ORDER — HEPARIN SODIUM 10000 [USP'U]/100ML
10-50 INJECTION, SOLUTION INTRAVENOUS CONTINUOUS
Status: DISCONTINUED | OUTPATIENT
Start: 2024-01-01 | End: 2024-06-18 | Stop reason: HOSPADM

## 2024-01-01 RX ORDER — HEPARIN SODIUM 10000 [USP'U]/100ML
500 INJECTION, SOLUTION INTRAVENOUS CONTINUOUS
Status: DISCONTINUED | OUTPATIENT
Start: 2024-01-01 | End: 2024-01-01

## 2024-01-01 RX ORDER — HEPARIN SODIUM 200 [USP'U]/100ML
3 INJECTION, SOLUTION INTRAVENOUS CONTINUOUS
Status: DISCONTINUED | OUTPATIENT
Start: 2024-01-01 | End: 2024-06-18 | Stop reason: HOSPADM

## 2024-01-01 RX ORDER — CALCIUM CHLORIDE, MAGNESIUM CHLORIDE, DEXTROSE MONOHYDRATE, LACTIC ACID, SODIUM CHLORIDE, SODIUM BICARBONATE AND POTASSIUM CHLORIDE 5.15; 2.03; 22; 5.4; 6.46; 3.09; .157 G/L; G/L; G/L; G/L; G/L; G/L; G/L
12.5 INJECTION INTRAVENOUS CONTINUOUS
Status: DISCONTINUED | OUTPATIENT
Start: 2024-01-01 | End: 2024-01-01

## 2024-01-01 RX ORDER — CALCITRIOL 0.25 UG/1
0.75 CAPSULE, LIQUID FILLED ORAL
COMMUNITY

## 2024-01-01 RX ORDER — CALCIUM GLUCONATE 20 MG/ML
2 INJECTION, SOLUTION INTRAVENOUS EVERY 8 HOURS PRN
Status: DISCONTINUED | OUTPATIENT
Start: 2024-01-01 | End: 2024-01-01

## 2024-01-01 RX ORDER — AMOXICILLIN 250 MG
1 CAPSULE ORAL 2 TIMES DAILY PRN
Status: DISCONTINUED | OUTPATIENT
Start: 2024-01-01 | End: 2024-06-18 | Stop reason: HOSPADM

## 2024-01-01 RX ORDER — DEXTROSE MONOHYDRATE 100 MG/ML
INJECTION, SOLUTION INTRAVENOUS CONTINUOUS PRN
Status: DISCONTINUED | OUTPATIENT
Start: 2024-01-01 | End: 2024-01-01

## 2024-01-01 RX ORDER — ASPIRIN 81 MG/1
81 TABLET, CHEWABLE ORAL DAILY
Status: DISCONTINUED | OUTPATIENT
Start: 2024-01-01 | End: 2024-06-18 | Stop reason: HOSPADM

## 2024-01-01 RX ORDER — MIDODRINE HYDROCHLORIDE 10 MG/1
10 TABLET ORAL DAILY
COMMUNITY

## 2024-01-01 RX ORDER — POTASSIUM CHLORIDE 29.8 MG/ML
20 INJECTION INTRAVENOUS EVERY 8 HOURS PRN
Status: DISCONTINUED | OUTPATIENT
Start: 2024-01-01 | End: 2024-01-01

## 2024-01-01 RX ORDER — POTASSIUM CHLORIDE 29.8 MG/ML
20 INJECTION INTRAVENOUS EVERY 8 HOURS PRN
Status: DISCONTINUED | OUTPATIENT
Start: 2024-01-01 | End: 2024-06-18 | Stop reason: HOSPADM

## 2024-01-01 RX ORDER — EPINEPHRINE 0.1 MG/ML
INJECTION INTRAVENOUS
Status: DISCONTINUED | OUTPATIENT
Start: 2024-01-01 | End: 2024-01-01 | Stop reason: HOSPADM

## 2024-01-01 RX ORDER — CALCIUM CHLORIDE, MAGNESIUM CHLORIDE, SODIUM CHLORIDE, SODIUM BICARBONATE, POTASSIUM CHLORIDE AND SODIUM PHOSPHATE DIBASIC DIHYDRATE 3.68; 3.05; 6.34; 3.09; .314; .187 G/L; G/L; G/L; G/L; G/L; G/L
12.5 INJECTION INTRAVENOUS CONTINUOUS
Status: DISCONTINUED | OUTPATIENT
Start: 2024-01-01 | End: 2024-06-18 | Stop reason: HOSPADM

## 2024-01-01 RX ORDER — GABAPENTIN 300 MG/1
300 CAPSULE ORAL 3 TIMES DAILY
COMMUNITY

## 2024-01-01 RX ORDER — FENTANYL CITRATE 50 UG/ML
100-200 INJECTION, SOLUTION INTRAMUSCULAR; INTRAVENOUS EVERY 5 MIN PRN
Status: DISCONTINUED | OUTPATIENT
Start: 2024-01-01 | End: 2024-06-18 | Stop reason: HOSPADM

## 2024-01-01 RX ORDER — CALCIUM GLUCONATE 20 MG/ML
1 INJECTION, SOLUTION INTRAVENOUS EVERY 6 HOURS PRN
Status: DISCONTINUED | OUTPATIENT
Start: 2024-01-01 | End: 2024-01-01

## 2024-01-01 RX ORDER — MAGNESIUM SULFATE HEPTAHYDRATE 40 MG/ML
2 INJECTION, SOLUTION INTRAVENOUS EVERY 8 HOURS PRN
Status: DISCONTINUED | OUTPATIENT
Start: 2024-01-01 | End: 2024-01-01

## 2024-01-01 RX ORDER — MAGNESIUM SULFATE HEPTAHYDRATE 40 MG/ML
2 INJECTION, SOLUTION INTRAVENOUS EVERY 8 HOURS PRN
Status: DISCONTINUED | OUTPATIENT
Start: 2024-01-01 | End: 2024-06-18 | Stop reason: HOSPADM

## 2024-01-01 RX ORDER — VASOPRESSIN 20 U/ML
INJECTION PARENTERAL
Status: DISCONTINUED | OUTPATIENT
Start: 2024-01-01 | End: 2024-01-01 | Stop reason: HOSPADM

## 2024-01-01 RX ORDER — POTASSIUM CHLORIDE 29.8 MG/ML
20 INJECTION INTRAVENOUS ONCE
Qty: 50 ML | Refills: 0 | Status: DISCONTINUED | OUTPATIENT
Start: 2024-01-01 | End: 2024-01-01

## 2024-01-01 RX ORDER — DEXTROSE MONOHYDRATE 100 MG/ML
INJECTION, SOLUTION INTRAVENOUS CONTINUOUS PRN
Status: DISCONTINUED | OUTPATIENT
Start: 2024-01-01 | End: 2024-06-18 | Stop reason: HOSPADM

## 2024-01-01 RX ORDER — POLYETHYLENE GLYCOL 3350 17 G/17G
17 POWDER, FOR SOLUTION ORAL DAILY PRN
Status: DISCONTINUED | OUTPATIENT
Start: 2024-01-01 | End: 2024-06-18 | Stop reason: HOSPADM

## 2024-01-01 RX ORDER — POTASSIUM CHLORIDE 29.8 MG/ML
20 INJECTION INTRAVENOUS
Status: DISCONTINUED | OUTPATIENT
Start: 2024-01-01 | End: 2024-01-01

## 2024-01-01 RX ORDER — ONDANSETRON 2 MG/ML
4 INJECTION INTRAMUSCULAR; INTRAVENOUS EVERY 6 HOURS PRN
Status: DISCONTINUED | OUTPATIENT
Start: 2024-01-01 | End: 2024-06-18 | Stop reason: HOSPADM

## 2024-01-01 RX ORDER — DEXTROSE MONOHYDRATE 25 G/50ML
25-50 INJECTION, SOLUTION INTRAVENOUS
Status: DISCONTINUED | OUTPATIENT
Start: 2024-01-01 | End: 2024-06-18 | Stop reason: HOSPADM

## 2024-01-01 RX ORDER — NITROGLYCERIN 0.4 MG/1
0.4 TABLET SUBLINGUAL EVERY 5 MIN PRN
COMMUNITY

## 2024-01-01 RX ORDER — HEPARIN SODIUM 10000 [USP'U]/100ML
10-50 INJECTION, SOLUTION INTRAVENOUS CONTINUOUS
Status: DISCONTINUED | OUTPATIENT
Start: 2024-01-01 | End: 2024-01-01

## 2024-01-01 RX ORDER — PROCHLORPERAZINE MALEATE 5 MG
5 TABLET ORAL EVERY 6 HOURS PRN
Status: DISCONTINUED | OUTPATIENT
Start: 2024-01-01 | End: 2024-06-18 | Stop reason: HOSPADM

## 2024-01-01 RX ORDER — IOPAMIDOL 755 MG/ML
INJECTION, SOLUTION INTRAVASCULAR
Status: DISCONTINUED | OUTPATIENT
Start: 2024-01-01 | End: 2024-01-01 | Stop reason: HOSPADM

## 2024-01-01 RX ORDER — CEFTRIAXONE 2 G/1
2 INJECTION, POWDER, FOR SOLUTION INTRAMUSCULAR; INTRAVENOUS EVERY 24 HOURS
Qty: 100 ML | Refills: 0 | Status: COMPLETED | OUTPATIENT
Start: 2024-01-01 | End: 2024-01-01

## 2024-01-01 RX ORDER — MAGNESIUM SULFATE HEPTAHYDRATE 40 MG/ML
2 INJECTION, SOLUTION INTRAVENOUS DAILY PRN
Status: DISCONTINUED | OUTPATIENT
Start: 2024-01-01 | End: 2024-01-01

## 2024-01-01 RX ORDER — CHLORHEXIDINE GLUCONATE ORAL RINSE 1.2 MG/ML
15 SOLUTION DENTAL EVERY 12 HOURS
Status: DISCONTINUED | OUTPATIENT
Start: 2024-01-01 | End: 2024-06-18 | Stop reason: HOSPADM

## 2024-01-01 RX ORDER — ONDANSETRON 4 MG/1
4 TABLET, ORALLY DISINTEGRATING ORAL EVERY 6 HOURS PRN
Status: DISCONTINUED | OUTPATIENT
Start: 2024-01-01 | End: 2024-06-18 | Stop reason: HOSPADM

## 2024-01-01 RX ORDER — NICOTINE POLACRILEX 4 MG
15-30 LOZENGE BUCCAL
Status: DISCONTINUED | OUTPATIENT
Start: 2024-01-01 | End: 2024-06-18 | Stop reason: HOSPADM

## 2024-01-01 RX ORDER — CALCIUM CHLORIDE, MAGNESIUM CHLORIDE, SODIUM CHLORIDE, SODIUM BICARBONATE, POTASSIUM CHLORIDE AND SODIUM PHOSPHATE DIBASIC DIHYDRATE 3.68; 3.05; 6.34; 3.09; .314; .187 G/L; G/L; G/L; G/L; G/L; G/L
INJECTION INTRAVENOUS CONTINUOUS
Status: DISCONTINUED | OUTPATIENT
Start: 2024-01-01 | End: 2024-06-18 | Stop reason: HOSPADM

## 2024-01-01 RX ORDER — MAGNESIUM SULFATE HEPTAHYDRATE 500 MG/ML
INJECTION, SOLUTION INTRAMUSCULAR; INTRAVENOUS CONTINUOUS PRN
Status: COMPLETED | OUTPATIENT
Start: 2024-01-01 | End: 2024-01-01

## 2024-01-01 RX ORDER — NOREPINEPHRINE BITARTRATE 0.06 MG/ML
INJECTION, SOLUTION INTRAVENOUS CONTINUOUS PRN
Status: COMPLETED | OUTPATIENT
Start: 2024-01-01 | End: 2024-01-01

## 2024-01-01 RX ORDER — CALCIUM CHLORIDE, MAGNESIUM CHLORIDE, DEXTROSE MONOHYDRATE, LACTIC ACID, SODIUM CHLORIDE, SODIUM BICARBONATE AND POTASSIUM CHLORIDE 5.15; 2.03; 22; 5.4; 6.46; 3.09; .157 G/L; G/L; G/L; G/L; G/L; G/L; G/L
INJECTION INTRAVENOUS CONTINUOUS
Status: DISCONTINUED | OUTPATIENT
Start: 2024-01-01 | End: 2024-01-01

## 2024-01-01 RX ORDER — NOREPINEPHRINE BITARTRATE 0.06 MG/ML
.01-.6 INJECTION, SOLUTION INTRAVENOUS CONTINUOUS
Status: DISCONTINUED | OUTPATIENT
Start: 2024-01-01 | End: 2024-01-01

## 2024-01-01 RX ORDER — NOREPINEPHRINE BITARTRATE 0.06 MG/ML
.01-.6 INJECTION, SOLUTION INTRAVENOUS CONTINUOUS
Status: DISCONTINUED | OUTPATIENT
Start: 2024-01-01 | End: 2024-06-18 | Stop reason: HOSPADM

## 2024-01-01 RX ORDER — CALCIUM GLUCONATE 20 MG/ML
2 INJECTION, SOLUTION INTRAVENOUS EVERY 8 HOURS PRN
Status: DISCONTINUED | OUTPATIENT
Start: 2024-01-01 | End: 2024-06-18 | Stop reason: HOSPADM

## 2024-01-01 RX ORDER — FENTANYL CITRATE 50 UG/ML
50-200 INJECTION, SOLUTION INTRAMUSCULAR; INTRAVENOUS EVERY 10 MIN PRN
Status: DISCONTINUED | OUTPATIENT
Start: 2024-01-01 | End: 2024-06-18 | Stop reason: HOSPADM

## 2024-01-01 RX ORDER — GLYCOPYRROLATE 0.2 MG/ML
0.1 INJECTION, SOLUTION INTRAMUSCULAR; INTRAVENOUS EVERY 4 HOURS PRN
Status: DISCONTINUED | OUTPATIENT
Start: 2024-01-01 | End: 2024-06-18 | Stop reason: HOSPADM

## 2024-01-01 RX ORDER — LIDOCAINE/PRILOCAINE 2.5 %-2.5%
CREAM (GRAM) TOPICAL PRN
COMMUNITY

## 2024-01-01 RX ORDER — VITAMIN B COMPLEX
2 TABLET ORAL DAILY
COMMUNITY

## 2024-01-01 RX ORDER — FENTANYL CITRATE 50 UG/ML
50-100 INJECTION, SOLUTION INTRAMUSCULAR; INTRAVENOUS ONCE
Status: COMPLETED | OUTPATIENT
Start: 2024-01-01 | End: 2024-01-01

## 2024-01-01 RX ORDER — PROCHLORPERAZINE 25 MG
12.5 SUPPOSITORY, RECTAL RECTAL EVERY 12 HOURS PRN
Status: DISCONTINUED | OUTPATIENT
Start: 2024-01-01 | End: 2024-06-18 | Stop reason: HOSPADM

## 2024-01-01 RX ORDER — MAGNESIUM SULFATE HEPTAHYDRATE 40 MG/ML
4 INJECTION, SOLUTION INTRAVENOUS EVERY 4 HOURS PRN
Status: DISCONTINUED | OUTPATIENT
Start: 2024-01-01 | End: 2024-01-01

## 2024-01-01 RX ORDER — LIDOCAINE 40 MG/G
CREAM TOPICAL
Status: DISCONTINUED | OUTPATIENT
Start: 2024-01-01 | End: 2024-06-18 | Stop reason: HOSPADM

## 2024-01-01 RX ORDER — B COMPLEX C NO.10/FOLIC ACID 900MCG/5ML
5 LIQUID (ML) ORAL DAILY
Status: DISCONTINUED | OUTPATIENT
Start: 2024-01-01 | End: 2024-06-18 | Stop reason: HOSPADM

## 2024-01-01 RX ORDER — FUROSEMIDE 10 MG/ML
40 INJECTION INTRAMUSCULAR; INTRAVENOUS ONCE
Status: DISCONTINUED | OUTPATIENT
Start: 2024-01-01 | End: 2024-01-01

## 2024-01-01 RX ORDER — ATORVASTATIN CALCIUM 40 MG/1
40 TABLET, FILM COATED ORAL DAILY
COMMUNITY

## 2024-01-01 RX ORDER — PIPERACILLIN SODIUM, TAZOBACTAM SODIUM 4; .5 G/20ML; G/20ML
4.5 INJECTION, POWDER, LYOPHILIZED, FOR SOLUTION INTRAVENOUS EVERY 6 HOURS
Qty: 560 ML | Refills: 0 | Status: DISCONTINUED | OUTPATIENT
Start: 2024-01-01 | End: 2024-06-18 | Stop reason: HOSPADM

## 2024-01-01 RX ADMIN — CALCIUM CHLORIDE, MAGNESIUM CHLORIDE, SODIUM CHLORIDE, SODIUM BICARBONATE, POTASSIUM CHLORIDE AND SODIUM PHOSPHATE DIBASIC DIHYDRATE 12.5 ML/KG/HR: 3.68; 3.05; 6.34; 3.09; .314; .187 INJECTION INTRAVENOUS at 10:00

## 2024-01-01 RX ADMIN — CEFTRIAXONE SODIUM 2 G: 2 INJECTION, POWDER, FOR SOLUTION INTRAMUSCULAR; INTRAVENOUS at 19:44

## 2024-01-01 RX ADMIN — NOREPINEPHRINE BITARTRATE 0.1 MCG/KG/MIN: 0.06 INJECTION, SOLUTION INTRAVENOUS at 20:21

## 2024-01-01 RX ADMIN — CALCIUM CHLORIDE, MAGNESIUM CHLORIDE, SODIUM CHLORIDE, SODIUM BICARBONATE, POTASSIUM CHLORIDE AND SODIUM PHOSPHATE DIBASIC DIHYDRATE: 3.68; 3.05; 6.34; 3.09; .314; .187 INJECTION INTRAVENOUS at 10:00

## 2024-01-01 RX ADMIN — HEPARIN SODIUM 500 UNITS/HR: 10000 INJECTION, SOLUTION INTRAVENOUS at 15:59

## 2024-01-01 RX ADMIN — CALCIUM CHLORIDE, MAGNESIUM CHLORIDE, DEXTROSE MONOHYDRATE, LACTIC ACID, SODIUM CHLORIDE, SODIUM BICARBONATE AND POTASSIUM CHLORIDE 12.5 ML/KG/HR: 5.15; 2.03; 22; 5.4; 6.46; 3.09; .157 INJECTION INTRAVENOUS at 01:32

## 2024-01-01 RX ADMIN — CALCIUM CHLORIDE, MAGNESIUM CHLORIDE, DEXTROSE MONOHYDRATE, LACTIC ACID, SODIUM CHLORIDE, SODIUM BICARBONATE AND POTASSIUM CHLORIDE 12.5 ML/KG/HR: 5.15; 2.03; 22; 5.4; 6.46; 3.09; .157 INJECTION INTRAVENOUS at 16:45

## 2024-01-01 RX ADMIN — PANTOPRAZOLE SODIUM 40 MG: 40 INJECTION, POWDER, FOR SOLUTION INTRAVENOUS at 20:09

## 2024-01-01 RX ADMIN — GLYCOPYRROLATE 0.2 MG: 0.2 INJECTION INTRAMUSCULAR; INTRAVENOUS at 21:57

## 2024-01-01 RX ADMIN — SODIUM CHLORIDE 10 ML/HR: 4 INJECTION, SOLUTION, CONCENTRATE INTRAVENOUS at 11:45

## 2024-01-01 RX ADMIN — CALCIUM CHLORIDE, MAGNESIUM CHLORIDE, DEXTROSE MONOHYDRATE, LACTIC ACID, SODIUM CHLORIDE, SODIUM BICARBONATE AND POTASSIUM CHLORIDE 12.5 ML/KG/HR: 5.15; 2.03; 22; 5.4; 6.46; 3.09; .157 INJECTION INTRAVENOUS at 18:09

## 2024-01-01 RX ADMIN — CHLORHEXIDINE GLUCONATE 0.12% ORAL RINSE 15 ML: 1.2 LIQUID ORAL at 19:48

## 2024-01-01 RX ADMIN — CALCIUM CHLORIDE, MAGNESIUM CHLORIDE, DEXTROSE MONOHYDRATE, LACTIC ACID, SODIUM CHLORIDE, SODIUM BICARBONATE AND POTASSIUM CHLORIDE 12.5 ML/KG/HR: 5.15; 2.03; 22; 5.4; 6.46; 3.09; .157 INJECTION INTRAVENOUS at 12:30

## 2024-01-01 RX ADMIN — CALCIUM CHLORIDE, MAGNESIUM CHLORIDE, DEXTROSE MONOHYDRATE, LACTIC ACID, SODIUM CHLORIDE, SODIUM BICARBONATE AND POTASSIUM CHLORIDE 12.5 ML/KG/HR: 5.15; 2.03; 22; 5.4; 6.46; 3.09; .157 INJECTION INTRAVENOUS at 05:21

## 2024-01-01 RX ADMIN — Medication 4 UNITS/HR: at 14:27

## 2024-01-01 RX ADMIN — CALCIUM CHLORIDE, MAGNESIUM CHLORIDE, DEXTROSE MONOHYDRATE, LACTIC ACID, SODIUM CHLORIDE, SODIUM BICARBONATE AND POTASSIUM CHLORIDE 12.5 ML/KG/HR: 5.15; 2.03; 22; 5.4; 6.46; 3.09; .157 INJECTION INTRAVENOUS at 08:05

## 2024-01-01 RX ADMIN — EPINEPHRINE 0.06 MCG/KG/MIN: 1 INJECTION INTRAMUSCULAR; INTRAVENOUS; SUBCUTANEOUS at 12:44

## 2024-01-01 RX ADMIN — ASPIRIN 81 MG CHEWABLE TABLET 81 MG: 81 TABLET CHEWABLE at 08:44

## 2024-01-01 RX ADMIN — CALCIUM CHLORIDE, MAGNESIUM CHLORIDE, DEXTROSE MONOHYDRATE, LACTIC ACID, SODIUM CHLORIDE, SODIUM BICARBONATE AND POTASSIUM CHLORIDE 12.5 ML/KG/HR: 5.15; 2.03; 22; 5.4; 6.46; 3.09; .157 INJECTION INTRAVENOUS at 08:52

## 2024-01-01 RX ADMIN — CEFTRIAXONE SODIUM 2 G: 2 INJECTION, POWDER, FOR SOLUTION INTRAMUSCULAR; INTRAVENOUS at 20:07

## 2024-01-01 RX ADMIN — CALCIUM CHLORIDE, MAGNESIUM CHLORIDE, DEXTROSE MONOHYDRATE, LACTIC ACID, SODIUM CHLORIDE, SODIUM BICARBONATE AND POTASSIUM CHLORIDE 12.5 ML/KG/HR: 5.15; 2.03; 22; 5.4; 6.46; 3.09; .157 INJECTION INTRAVENOUS at 00:45

## 2024-01-01 RX ADMIN — EPINEPHRINE 0.1 MCG/KG/MIN: 1 INJECTION INTRAMUSCULAR; INTRAVENOUS; SUBCUTANEOUS at 20:51

## 2024-01-01 RX ADMIN — CALCIUM CHLORIDE, MAGNESIUM CHLORIDE, DEXTROSE MONOHYDRATE, LACTIC ACID, SODIUM CHLORIDE, SODIUM BICARBONATE AND POTASSIUM CHLORIDE 12.5 ML/KG/HR: 5.15; 2.03; 22; 5.4; 6.46; 3.09; .157 INJECTION INTRAVENOUS at 08:04

## 2024-01-01 RX ADMIN — Medication 5 ML: at 08:44

## 2024-01-01 RX ADMIN — CALCIUM CHLORIDE, MAGNESIUM CHLORIDE, DEXTROSE MONOHYDRATE, LACTIC ACID, SODIUM CHLORIDE, SODIUM BICARBONATE AND POTASSIUM CHLORIDE 12.5 ML/KG/HR: 5.15; 2.03; 22; 5.4; 6.46; 3.09; .157 INJECTION INTRAVENOUS at 14:13

## 2024-01-01 RX ADMIN — CALCIUM CHLORIDE, MAGNESIUM CHLORIDE, DEXTROSE MONOHYDRATE, LACTIC ACID, SODIUM CHLORIDE, SODIUM BICARBONATE AND POTASSIUM CHLORIDE 12.5 ML/KG/HR: 5.15; 2.03; 22; 5.4; 6.46; 3.09; .157 INJECTION INTRAVENOUS at 22:09

## 2024-01-01 RX ADMIN — NOREPINEPHRINE BITARTRATE 0.16 MCG/KG/MIN: 0.06 INJECTION, SOLUTION INTRAVENOUS at 19:46

## 2024-01-01 RX ADMIN — MAGNESIUM SULFATE HEPTAHYDRATE 2 G: 2 INJECTION, SOLUTION INTRAVENOUS at 00:44

## 2024-01-01 RX ADMIN — CALCIUM CHLORIDE, MAGNESIUM CHLORIDE, DEXTROSE MONOHYDRATE, LACTIC ACID, SODIUM CHLORIDE, SODIUM BICARBONATE AND POTASSIUM CHLORIDE 12.5 ML/KG/HR: 5.15; 2.03; 22; 5.4; 6.46; 3.09; .157 INJECTION INTRAVENOUS at 00:04

## 2024-01-01 RX ADMIN — PIPERACILLIN AND TAZOBACTAM 4.5 G: 4; .5 INJECTION, POWDER, FOR SOLUTION INTRAVENOUS at 15:56

## 2024-01-01 RX ADMIN — NOREPINEPHRINE BITARTRATE 0.06 MCG/KG/MIN: 0.06 INJECTION, SOLUTION INTRAVENOUS at 05:53

## 2024-01-01 RX ADMIN — SODIUM BICARBONATE 100 MEQ: 84 INJECTION, SOLUTION INTRAVENOUS at 02:34

## 2024-01-01 RX ADMIN — Medication 3 UNITS/HR: at 23:32

## 2024-01-01 RX ADMIN — CHLORHEXIDINE GLUCONATE 0.12% ORAL RINSE 15 ML: 1.2 LIQUID ORAL at 07:41

## 2024-01-01 RX ADMIN — CALCIUM CHLORIDE, MAGNESIUM CHLORIDE, DEXTROSE MONOHYDRATE, LACTIC ACID, SODIUM CHLORIDE, SODIUM BICARBONATE AND POTASSIUM CHLORIDE: 5.15; 2.03; 22; 5.4; 6.46; 3.09; .157 INJECTION INTRAVENOUS at 16:44

## 2024-01-01 RX ADMIN — HEPARIN SODIUM 500 UNITS/HR: 10000 INJECTION, SOLUTION INTRAVENOUS at 16:46

## 2024-01-01 RX ADMIN — CHLORHEXIDINE GLUCONATE 0.12% ORAL RINSE 15 ML: 1.2 LIQUID ORAL at 08:35

## 2024-01-01 RX ADMIN — ASPIRIN 81 MG CHEWABLE TABLET 81 MG: 81 TABLET CHEWABLE at 08:35

## 2024-01-01 RX ADMIN — HEPARIN SODIUM 3 ML/HR: 200 INJECTION, SOLUTION INTRAVENOUS at 20:50

## 2024-01-01 RX ADMIN — POTASSIUM CHLORIDE 20 MEQ: 29.8 INJECTION, SOLUTION INTRAVENOUS at 23:43

## 2024-01-01 RX ADMIN — ASPIRIN 81 MG CHEWABLE TABLET 81 MG: 81 TABLET CHEWABLE at 11:05

## 2024-01-01 RX ADMIN — CALCIUM CHLORIDE, MAGNESIUM CHLORIDE, SODIUM CHLORIDE, SODIUM BICARBONATE, POTASSIUM CHLORIDE AND SODIUM PHOSPHATE DIBASIC DIHYDRATE 12.5 ML/KG/HR: 3.68; 3.05; 6.34; 3.09; .314; .187 INJECTION INTRAVENOUS at 15:54

## 2024-01-01 RX ADMIN — CHLORHEXIDINE GLUCONATE 0.12% ORAL RINSE 15 ML: 1.2 LIQUID ORAL at 07:46

## 2024-01-01 RX ADMIN — Medication 4 UNITS/HR: at 22:09

## 2024-01-01 RX ADMIN — PANTOPRAZOLE SODIUM 40 MG: 40 INJECTION, POWDER, FOR SOLUTION INTRAVENOUS at 19:58

## 2024-01-01 RX ADMIN — PANTOPRAZOLE SODIUM 40 MG: 40 INJECTION, POWDER, FOR SOLUTION INTRAVENOUS at 19:47

## 2024-01-01 RX ADMIN — PANTOPRAZOLE SODIUM 40 MG: 40 INJECTION, POWDER, FOR SOLUTION INTRAVENOUS at 08:35

## 2024-01-01 RX ADMIN — EPINEPHRINE 0.1 MCG/KG/MIN: 1 INJECTION INTRAMUSCULAR; INTRAVENOUS; SUBCUTANEOUS at 07:51

## 2024-01-01 RX ADMIN — CEFTRIAXONE SODIUM 2 G: 2 INJECTION, POWDER, FOR SOLUTION INTRAMUSCULAR; INTRAVENOUS at 20:12

## 2024-01-01 RX ADMIN — NOREPINEPHRINE BITARTRATE 0.06 MCG/KG/MIN: 0.06 INJECTION, SOLUTION INTRAVENOUS at 05:48

## 2024-01-01 RX ADMIN — ASPIRIN 81 MG CHEWABLE TABLET 81 MG: 81 TABLET CHEWABLE at 07:46

## 2024-01-01 RX ADMIN — THIAMINE HCL TAB 100 MG 100 MG: 100 TAB at 08:44

## 2024-01-01 RX ADMIN — PANTOPRAZOLE SODIUM 40 MG: 40 INJECTION, POWDER, FOR SOLUTION INTRAVENOUS at 08:44

## 2024-01-01 RX ADMIN — CEFTRIAXONE SODIUM 2 G: 2 INJECTION, POWDER, FOR SOLUTION INTRAMUSCULAR; INTRAVENOUS at 19:58

## 2024-01-01 RX ADMIN — Medication 2 UNITS/HR: at 20:26

## 2024-01-01 RX ADMIN — SODIUM BICARBONATE 50 MEQ: 84 INJECTION, SOLUTION INTRAVENOUS at 20:36

## 2024-01-01 RX ADMIN — Medication 100 MEQ: at 02:34

## 2024-01-01 RX ADMIN — PANTOPRAZOLE SODIUM 40 MG: 40 INJECTION, POWDER, FOR SOLUTION INTRAVENOUS at 07:22

## 2024-01-01 RX ADMIN — CHLORHEXIDINE GLUCONATE 0.12% ORAL RINSE 15 ML: 1.2 LIQUID ORAL at 20:09

## 2024-01-01 RX ADMIN — PANTOPRAZOLE SODIUM 40 MG: 40 INJECTION, POWDER, FOR SOLUTION INTRAVENOUS at 19:48

## 2024-01-01 RX ADMIN — PANTOPRAZOLE SODIUM 40 MG: 40 INJECTION, POWDER, FOR SOLUTION INTRAVENOUS at 19:44

## 2024-01-01 RX ADMIN — MAGNESIUM SULFATE HEPTAHYDRATE 2 G: 2 INJECTION, SOLUTION INTRAVENOUS at 05:43

## 2024-01-01 RX ADMIN — CALCIUM CHLORIDE, MAGNESIUM CHLORIDE, SODIUM CHLORIDE, SODIUM BICARBONATE, POTASSIUM CHLORIDE AND SODIUM PHOSPHATE DIBASIC DIHYDRATE 12.5 ML/KG/HR: 3.68; 3.05; 6.34; 3.09; .314; .187 INJECTION INTRAVENOUS at 19:34

## 2024-01-01 RX ADMIN — CHLORHEXIDINE GLUCONATE 0.12% ORAL RINSE 15 ML: 1.2 LIQUID ORAL at 19:47

## 2024-01-01 RX ADMIN — CHLORHEXIDINE GLUCONATE 0.12% ORAL RINSE 15 ML: 1.2 LIQUID ORAL at 20:25

## 2024-01-01 RX ADMIN — CHLORHEXIDINE GLUCONATE 0.12% ORAL RINSE 15 ML: 1.2 LIQUID ORAL at 07:22

## 2024-01-01 RX ADMIN — PANTOPRAZOLE SODIUM 40 MG: 40 INJECTION, POWDER, FOR SOLUTION INTRAVENOUS at 07:46

## 2024-01-01 RX ADMIN — Medication 4 UNITS/HR: at 20:55

## 2024-01-01 RX ADMIN — CHLORHEXIDINE GLUCONATE 0.12% ORAL RINSE 15 ML: 1.2 LIQUID ORAL at 19:58

## 2024-01-01 RX ADMIN — Medication 4 UNITS/HR: at 13:20

## 2024-01-01 RX ADMIN — Medication 4 UNITS/HR: at 08:08

## 2024-01-01 RX ADMIN — Medication 3 UNITS/HR: at 00:44

## 2024-01-01 RX ADMIN — CALCIUM CHLORIDE, MAGNESIUM CHLORIDE, DEXTROSE MONOHYDRATE, LACTIC ACID, SODIUM CHLORIDE, SODIUM BICARBONATE AND POTASSIUM CHLORIDE 12.5 ML/KG/HR: 5.15; 2.03; 22; 5.4; 6.46; 3.09; .157 INJECTION INTRAVENOUS at 16:44

## 2024-01-01 RX ADMIN — FENTANYL CITRATE 100 MCG: 50 INJECTION, SOLUTION INTRAMUSCULAR; INTRAVENOUS at 21:57

## 2024-01-01 RX ADMIN — Medication 4 UNITS/HR: at 00:04

## 2024-01-01 RX ADMIN — POTASSIUM CHLORIDE 20 MEQ: 29.8 INJECTION, SOLUTION INTRAVENOUS at 06:01

## 2024-01-01 RX ADMIN — CHLORHEXIDINE GLUCONATE 0.12% ORAL RINSE 15 ML: 1.2 LIQUID ORAL at 08:44

## 2024-01-01 RX ADMIN — CALCIUM CHLORIDE, MAGNESIUM CHLORIDE, DEXTROSE MONOHYDRATE, LACTIC ACID, SODIUM CHLORIDE, SODIUM BICARBONATE AND POTASSIUM CHLORIDE: 5.15; 2.03; 22; 5.4; 6.46; 3.09; .157 INJECTION INTRAVENOUS at 14:13

## 2024-01-01 RX ADMIN — Medication 4 UNITS/HR: at 04:15

## 2024-01-01 RX ADMIN — CEFTRIAXONE SODIUM 2 G: 2 INJECTION, POWDER, FOR SOLUTION INTRAMUSCULAR; INTRAVENOUS at 19:48

## 2024-01-01 RX ADMIN — MIDAZOLAM 2 MG: 1 INJECTION INTRAMUSCULAR; INTRAVENOUS at 21:57

## 2024-01-01 RX ADMIN — Medication 3 UNITS/HR: at 05:54

## 2024-01-01 RX ADMIN — NOREPINEPHRINE BITARTRATE 0.1 MCG/KG/MIN: 0.06 INJECTION, SOLUTION INTRAVENOUS at 22:09

## 2024-01-01 RX ADMIN — VANCOMYCIN HYDROCHLORIDE 2250 MG: 1 INJECTION, POWDER, LYOPHILIZED, FOR SOLUTION INTRAVENOUS at 21:18

## 2024-01-01 RX ADMIN — DEXTROSE MONOHYDRATE 1000 ML: 100 INJECTION, SOLUTION INTRAVENOUS at 21:59

## 2024-01-01 RX ADMIN — SODIUM CHLORIDE, POTASSIUM CHLORIDE, SODIUM LACTATE AND CALCIUM CHLORIDE 1000 ML: 600; 310; 30; 20 INJECTION, SOLUTION INTRAVENOUS at 01:11

## 2024-01-01 RX ADMIN — THIAMINE HCL TAB 100 MG 100 MG: 100 TAB at 08:35

## 2024-01-01 RX ADMIN — Medication 4 UNITS/HR: at 12:44

## 2024-01-01 RX ADMIN — CALCIUM CHLORIDE 1 G: 100 INJECTION, SOLUTION INTRAVENOUS at 23:59

## 2024-01-01 RX ADMIN — IOPAMIDOL 67 ML: 755 INJECTION, SOLUTION INTRAVENOUS at 11:57

## 2024-01-01 RX ADMIN — PANTOPRAZOLE SODIUM 40 MG: 40 INJECTION, POWDER, FOR SOLUTION INTRAVENOUS at 07:41

## 2024-01-01 RX ADMIN — Medication 3 UNITS/HR: at 01:11

## 2024-01-01 RX ADMIN — Medication 3 UNITS/HR: at 11:45

## 2024-01-01 ASSESSMENT — ACTIVITIES OF DAILY LIVING (ADL)
ADLS_ACUITY_SCORE: 55
ADLS_ACUITY_SCORE: 59
ADLS_ACUITY_SCORE: 59
ADLS_ACUITY_SCORE: 63
ADLS_ACUITY_SCORE: 63
ADLS_ACUITY_SCORE: 59
ADLS_ACUITY_SCORE: 55
ADLS_ACUITY_SCORE: 63
ADLS_ACUITY_SCORE: 59
ADLS_ACUITY_SCORE: 63
ADLS_ACUITY_SCORE: 63
ADLS_ACUITY_SCORE: 59
ADLS_ACUITY_SCORE: 51
ADLS_ACUITY_SCORE: 59
ADLS_ACUITY_SCORE: 35
ADLS_ACUITY_SCORE: 59
ADLS_ACUITY_SCORE: 55
ADLS_ACUITY_SCORE: 59
ADLS_ACUITY_SCORE: 63
ADLS_ACUITY_SCORE: 59
ADLS_ACUITY_SCORE: 63
ADLS_ACUITY_SCORE: 47
ADLS_ACUITY_SCORE: 47
ADLS_ACUITY_SCORE: 59
ADLS_ACUITY_SCORE: 55
ADLS_ACUITY_SCORE: 59
ADLS_ACUITY_SCORE: 63
ADLS_ACUITY_SCORE: 59
ADLS_ACUITY_SCORE: 47
ADLS_ACUITY_SCORE: 59
ADLS_ACUITY_SCORE: 59
ADLS_ACUITY_SCORE: 63
ADLS_ACUITY_SCORE: 59
ADLS_ACUITY_SCORE: 63
ADLS_ACUITY_SCORE: 59
ADLS_ACUITY_SCORE: 63
ADLS_ACUITY_SCORE: 59
ADLS_ACUITY_SCORE: 55
ADLS_ACUITY_SCORE: 59
ADLS_ACUITY_SCORE: 63
ADLS_ACUITY_SCORE: 63
ADLS_ACUITY_SCORE: 55
ADLS_ACUITY_SCORE: 59
ADLS_ACUITY_SCORE: 35
ADLS_ACUITY_SCORE: 59
ADLS_ACUITY_SCORE: 63
ADLS_ACUITY_SCORE: 59
ADLS_ACUITY_SCORE: 63
ADLS_ACUITY_SCORE: 35
ADLS_ACUITY_SCORE: 59
ADLS_ACUITY_SCORE: 63
ADLS_ACUITY_SCORE: 59
ADLS_ACUITY_SCORE: 63
ADLS_ACUITY_SCORE: 59
ADLS_ACUITY_SCORE: 63
ADLS_ACUITY_SCORE: 59
ADLS_ACUITY_SCORE: 63
ADLS_ACUITY_SCORE: 55
ADLS_ACUITY_SCORE: 63
ADLS_ACUITY_SCORE: 59
ADLS_ACUITY_SCORE: 59

## 2024-01-01 ASSESSMENT — VISUAL ACUITY
OU: NOT TESTABLE
OU: NOT TESTABLE

## 2024-06-12 PROBLEM — I46.9 CARDIAC ARREST (H): Status: ACTIVE | Noted: 2024-01-01

## 2024-06-12 NOTE — PROGRESS NOTES
Patient belongings:  Patient came in wearing a navy blue and gray plaid flannel shirt. Had a cellular phone and 100.00 in cash with him as well.  All belongings are placed in a labeled bag and transferred to the ICU with patient.

## 2024-06-12 NOTE — PROGRESS NOTES
[facility]         Intra-Aortic Balloon Pump Insertion:       Arrival Date: 6/12/2024  Arrival Time:   Inserted at: [facility]    Insertion Date: 6/12/2024  Insertion Time: 1755  Insertion Locations: CCL    Insertion Details:  Physician: Kale  Site: Left Femoral Artery  Catheter Size: 8 Fr.  Balloon Volume: 50 cc  Fiberoptic: YES and NO  Sheath: YES  Position verified with: fluoroscopy    Initial Settings:  Mode: Auto        Machine #: [id number]      Michelle Cervantes RT  ECMO Specialist  6/12/2024 6:01 [facility]

## 2024-06-12 NOTE — PROGRESS NOTES
[facility]    ECLS Cannulation Note:     Date on: 6/12/2024  Time on: 1653  Cannulating Physician: Kale  Pre-cannulation ABG: pH <6.75/PaCO2 >130/ PaO2 22.1/Lactate 14.1  ABG Time: 16:47    Arterial Cannula: 17 Fr. In the Right Femoral Artery  Venous Cannula: 25 Fr. In the Right Femoral Vein  Distal Perfusion Catheter: 8 Fr. In the Right Superficial Femoral Artery    ECMO components include:  Cardiohelp Oxy Lot #[id number]  Cardiohelp Circuit Lot #[id number]     Cannulation was performed in the Cath Lab, placement was verified by fluoroscopy, cannula position is good. Patient's blood type is pending.    Jessica Barnes, RT  ECMO Specialist  6/12/2024 6:12 PM

## 2024-06-12 NOTE — H&P
[facility]    Cardiology ICU History and Physical      HPI:     Tyler Renteria ( [birth year], MRN [id number]) is 63 YO male with past medical history of end-stage kidney disease on hemodialysis MWF, coronary artery disease (status post CABG , redo CABG , multiple PCIs of unknown targets), afib, type 2 diabetes, ischemic cardiomyopathy (LVEF 30-35% on 2024, care everywhere) and trifascicular block status post PPM. He was recently admitted to [facility] from 24 to 24 for CHF exacerbation, hypotension, anemia and GI bleed. Underwent EGD 24 which showed 4 small non-bleeding angioectasias which were treated with APC and a single AVM in duodenum was treated with APC and clip. Colonoscopy on 24 showed multiple polyps and a large cecal polyp not amenable to removal endoscopically. Biopsy of mass consistent with tubulovillous adenoma. It was discussed that he would need hemicolectomy for removal of that mass but likely wouldn't tolerate the procedure currently as he didn't tolerate colonoscopy. Patient contemplated comfort cares/hospice but was still full code at discharge. A    Earlier today, he was riding scooter on the freeway when he collapsed, CPR initiated.  No-flow time around 5 minutes.  Received 16 shocks in total, rhythm was asystole and VT/VF, total downtime 1 hour.  Rhythm and Cath Lab was asystole.  Underwent VA ECMO cannulation and placement of intra-aortic balloon pump.  Coronary angiogram showed  of the ostial circumflex with a patent vein graft to OM1 and OM 2, small to medium caliber LAD with patent LIMA to distal LAD graft, completely occluded large caliber diagonal vessel due to significant in-stent stenosis, diffusely diseased native RCA with extremely sluggish flow and a patent vein graft to RPDA.     Initial rhythm in the cath lab: Asystole/VF  First ABG: pH less than 6.75, CO2 130, pO2 22, lactate  "14    Medications:   I have reviewed this patient's current medications    No past medical history on file.    No family history on file.  Social History     Socioeconomic History    Marital status: Not on file     Spouse name: Not on file    Number of children: Not on file    Years of education: Not on file    Highest education level: Not on file   Occupational History    Not on file   Tobacco Use    Smoking status: Not on file    Smokeless tobacco: Not on file   Substance and Sexual Activity    Alcohol use: Not on file    Drug use: Not on file    Sexual activity: Not on file   Other Topics Concern    Not on file   Social History Narrative    Not on file     Social Determinants of Health     Financial Resource Strain: Not on file   Food Insecurity: Not on file   Transportation Needs: Not on file   Physical Activity: Not on file   Stress: Not on file   Social Connections: Not on file   Interpersonal Safety: Not on file   Housing Stability: Not on file       Review of Systems:   Unable to obtain ROS as patient is intubated and sedated     Physical Exam:   Ht 1.8 m (5' 10.87\")   Wt 107.4 kg (236 lb 12.4 oz)   SpO2 100%   BMI 33.15 kg/m      GEN: intubated, sedated  HEENT: no icterus  CV: RRR, normal s1/s2, no murmurs   CHEST: mechanical breath sounds   ABD: soft, NT/ND, NABS  : no flank/suprapubic tenderness  NEURO: intubated sedated  EXTREMITIES:  LE edema        Data:   All lab results reviewed    Coronary angiogram:  VT/V-fib arrest.  Successful ECMO cannulation with 17 Bengali right common femoral arterial and 25 Bengali right femoral venous cannulas.  Successful intra-aortic balloon pump placement via 9 Bengali left common femoral arterial sheath.  Successful placement of triple-lumen central venous catheter in the left femoral vein.  Successful placement of right radial arterial line    Severe multivessel coronary disease status post coronary bypass grafting.  Ostial circumflex is completely occluded with " patent vein graft to OM1 and OM 2.  Small to medium caliber LAD with patent LIMA to distal LAD graft. Distal LAD supplies very small territory.  Completely occluded large caliber diagonal vessel due to significant in-stent restenosis.  Native RCA is diffusely disease with extremely sluggish flow.  Patent vein graft to RPDA.    Assessment and Plan:     Tyler Renteria ( [birth year], MRN [id number]) is 63 YO male with past medical history of end-stage kidney disease on hemodialysis MWF, coronary artery disease (status post CABG , redo CABG , multiple PCIs of unknown targets), afib, type 2 diabetes, ischemic cardiomyopathy (LVEF 30-35% on 2024, care everywhere), trifascicular block status post PPM and recent diagnosis of tubulovillous adenoma of the cecum who presents after out of hospital VF arrest, status post VA ECMO cannulation.    Neuro:  #Concern for anoxic brain injury:  - Intubated, sedated, paralyzed. Prevent hyperthermia  - Neuroprotective measures, Permisssive hyper --155, and permissive hypercapnea. Avoid hypotonic solutions.   - No sedation on board   - Cis as needed to maintain paralysis if shivering  - Neuro-crit consulted   - vEEG   - Palliative care consulted.   - Head CT, will repeat on day 3.    CV:  #Refractory VF Cardiac arrest requiring VA ECMO  #CAD s/p CABG , redo CABG , multiple PCIs  #ICM - LVEF 30-35% on 2024  #Dyslipidemia:  - Peripheral V-A ECMO inserted via RCFA and RCFV  - TTE: ordered  - EKG:V-paced rhythm   - ECMO via R common femoral artery and vein  - IBAP: 1:1  Plan:  - Routine early ECMO cares overnight, trend perfusion markers   - ACT goal 180-200  - US LE w/ arterial duplex  - Carotid artery US      Pulm:  #Acute hypoxemic respiratory failure requiring intubation  #Concern for aspiration pneumonia  FiO2 (%): 70 %  - CXR: Ordered   Plan:  - Wean vent as able  - Daily CXR  - Q2h ABGs   - Consider scheduled duonebs if signs of lung dz, currently PRN       GI/Nutrition:  #Concern for shock liver 2/2 cardiac arrest  #Upper and lower GI bleed   Plan:  - Monitor LFTs  - NPO while cooled   - Bowel regimen - on hold for now due to hypothermia  - Trend Hgb     #GI Prophylaxis:   - PPI; Protonix 40 mg daily    #Nutrition   - OG in place - Nutrition consult pending feeding tube placement once he is warmed     Renal/Electrolytes:  #ESRD on HD MWF   # Lactic acidosis  Plan:  - Renal consult for CRRT   - Trend lactate     ID:  #Concern for aspiration Pneumonia in the setting of cardiac arrest  #Leukocytosis:  - Leukocytosis c/w arrest.   - Monitor for signs of infection given cooling, lines, and leukocytosis  Plan:  - Daily blood cultures.  - Vancomycin 06/12 - present   - CTX 06/12 - present      Heme/Onc:  #Anemia, 2/2 GI bleed and critical illness   #Tubulovillous adenoma of the cecum  Plan:  - Receiving heparin for ECMO with ACT goal 180-200   - Transfuse for Hgb<7  - DVT PPX: Heparin as above    Risk of Coagulopathy  - Trend INR, PT/PTT, fibrinogen, PLT. Transfuse products as appropriate     Endo:  Plan:  - Insulin gtt or subcutaneous as needed   - A1c, TSH ordered     Lines:  - Montesinos - in place  - 17fr Arterial Cannula - RCFA  - 25fr Venous Cannula - RCFV  - Right radial arterial line - in place  - ETT - in place  - Current lines/drains/tubes are required for patient management    Family update by me today: Will do    Goals of care per notes during recent admission to [facility]:  Specific Treatment Preferences:   a.) Code Status: CPR/Attempt Resuscitation   Unless my doctor determines any one of the following:  -I have an incurable illness or injury and am dying; OR  -I have no reasonable chance of survival if my heart stops; OR  -I have little chance of long term survival if my heart stops and the process of CPR would cause significant suffering.    b.) Goals of Treatment:  ii. Limited Interventions and treat reversible conditions. Provide interventions aimed at  treatment of new or reversible illness/injury or non-life threatening chronic conditions. Duration of invasive or uncomfortable interventions should generally be limited.- Trial of intubation short term for recovery purposes if possible. not to be used long term/permanent     c.) Interventions and Treatments:   i. Antibiotics: - Use Aggressive antibiotic treatment    ii. Nutrition/Hydration: - Time limited trial of nutrition/hydration specific focused use only for recovery with no impairments. If otherwise, no tube feeding.     iv. Dialysis: - Dialysis for short term specific time limited only for full recovery only.      Code Status: FULL    Discussed with Dr. Kale Wolfe MD  Cardiology Fellow

## 2024-06-12 NOTE — Clinical Note
saphenous vein graft Cine(s)  injected and visualized utilizing power injector system. Jump Graft - OM 1 and OM 2

## 2024-06-12 NOTE — Clinical Note
IABP inserted in the left femoral artery. IABP inserted with 50 cc balloon volume Lot number is: [id number]

## 2024-06-12 NOTE — ANESTHESIA PROCEDURE NOTES
Airway       Patient location: Cath Lab.       Procedure Start/Stop Times: 6/12/2024 4:59 PM  Staff -        Anesthesiologist:  Marcus Muñoz MD       Resident/Fellow: Mahnaz Jerez MD       Performed By: resident  Consent for Airway        Urgency: emergent       Consent: The procedure was performed in an emergent situation.  Indications and Patient Condition       Indications for airway management: cardiovascular arrest         Mask difficulty assessment: 0 - not attempted (LMA in situ)    Final Airway Details       Final airway type: endotracheal airway       Successful airway: ETT - single  Endotracheal Airway Details        ETT size (mm): 8.0       Cuffed: yes       Successful intubation technique: video laryngoscopy       VL Blade Size: Glidescope 4       Grade View of Cords: 1       Adjucts: stylet       Position: Right       Measured from: gums/teeth       Secured at (cm): 24    Post intubation assessment        Number of attempts at approach: 1       Secured with: commercial tube cardenas       Ease of procedure: easy       Dentition: Intact and Unchanged    Medication(s) Administered   Medication Administration Time: 6/12/2024 4:59 PM    Additional Comments       Patient already cannulated on ECMO; supraglottic airway in situ. Removed immediately prior to intubation. Blood/secretions suctioned from airway.

## 2024-06-12 NOTE — PROGRESS NOTES
Patient arrived with [facility]ACHEL CPR in progress. Patient identified by EMS, no 's license provided.    Name: JOANNA VILA JR  : 1960  [facility] MRN: [id number]    No family available at this time. Will defer to primary team (SHAWN Henley) for family notification.

## 2024-06-13 NOTE — PROGRESS NOTES
Nephrology Initial Consult  June 13, 2024      Tyler Renteria Jr MRN:[id number] YOB: 1902  Date of Admission:6/12/2024  Primary care provider: No primary care provider on file.  Requesting physician: Invasive, Cardiologist, *      Assessment & Recommendations:   ESRD-Unknown details other than MWF until records are available.  Currently on ECMO, will need RRT  but can hold today with no urgent indication, lactate is up and bicarb is 14 but pH is 7.30 currently, will plan CRRT likely tomorrow am.    -ESRD, will need RRT but no urgent indication.  Bicarb low but pH 7.30.     -No need for new RRT consent, continuing long term RRT for ESRD.    -Access will be ECMO circuit.      Volume status-+1-2 edema in BLE but stable pulm status.  If further volume expanded we can use isotonic bicarb as IVF.        Electrolytes/pH-K 3.8, bicarb 14 with pH of 7.30 with lactic acidosis.      Ca/phos/pth-No acute issues.      Anemia-Hgb 8.5, acute management per team.      Nutrition-Deferred     Time spent: 45 minutes on this date of encounter for chart review, physical exam, medical decision making and co-ordination of care.      Seen and discussed with Dr Soares     Recommendations were communicated to primary team via verbal communication.      CHRIS Curran CNS  Clinical Nurse Specialist  [phone]     Review of Systems:   I reviewed the following systems:  ROS not done due to vent/sedation.      CHRIS Curran CNS  Clinical Nurse Specialist  [phone]    REASON FOR CONSULT: ESRD    HISTORY OF PRESENT ILLNESS:  Tyler Renteria is 64 yom w/ESRD on HD MWF, CABG 2001, redo CABG 2011, multiple PCIs of unknown targets, afib, DM2, EF 30-35% with PPM for CHB. Recently admitted to [facility] 5/21-5/31/24 for CHF exacerbation, hypotension, anemia and GIB. EGD 5/22/24 w/4 non-bleeding angioectasias and single AVM in duodenum treated with APC and clip.  Colonoscopy on 5/24/24 w/multiple polyps and a large  cecal polyp not amenable to removal. 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 Now s/p arrest on 6/12, on ECMO with IABP, Nephrology consulted for management of dialysis post arrest with lactic acidosis.     PAST MEDICAL HISTORY:  ICM   -EF 30-35%   -CABG 2001, 2011   -PPM  DM2  A-fib  GIB   -AVM, clipped 5/22/24  Tubulovillous adenoma   -bx 5/24/24   -Limited surgical options due to ICM    MEDICATIONS:  PTA Meds  Prior to Admission medications    Not on File      Current Meds  Current Facility-Administered Medications   Medication Dose Route Frequency Provider Last Rate Last Admin    artificial tears ophthalmic ointment   Both Eyes Q8H Aries Albert MD        cefTRIAXone (ROCEPHIN) 2 g vial to attach to  ml bag for ADULTS or NS 50 ml bag for PEDS  2 g Intravenous Q24H Aries Albert MD   2 g at 06/12/24 2012    chlorhexidine (PERIDEX) 0.12 % solution 15 mL  15 mL Mouth/Throat Q12H Aries Albert MD   15 mL at 06/13/24 0741    pantoprazole (PROTONIX) IV push injection 40 mg  40 mg Intravenous BID Hermila Saba APRN CNP   40 mg at 06/13/24 0741    potassium chloride 20 mEq in 50 mL intermittent infusion  20 mEq Intravenous Once Invasive, Cardiologist, MD        vancomycin place cardenas - receiving intermittent dosing  1 each Intravenous See Admin Instructions Invasive CardiologMD julia         Infusion Meds  Current Facility-Administered Medications   Medication Dose Route Frequency Provider Last Rate Last Admin    dextrose 10% infusion   Intravenous Continuous PRN Aries Albert MD 10 mL/hr at 06/13/24 0800 Rate Verify at 06/13/24 0800    EPINEPHrine (ADRENALIN) 16 mg in sodium chloride 0.9 % 250 mL infusion CENTRAL  0.01-0.3 mcg/kg/min (Dosing Weight) Intravenous Continuous Invasive CardiologMD julia 5 mL/hr at 06/13/24 0800 0.05 mcg/kg/min at 06/13/24 0800    heparin (porcine) 100 unit/mL in 0.45% Sodium Chloride ANTICOAGULANT  infusion  10-50 Units/kg/hr (Ideal) Other Continuous Invasive CardiologMD julia   Held at 24    heparin 2 unit/mL in 0.9% NaCl (for REPERFUSION CATHETER)  3 mL/hr Intravenous Continuous Aries Albert MD 3 mL/hr at 24 0800 3 mL/hr at 24 08    insulin regular (MYXREDLIN) 1 unit/mL infusion  0-24 Units/hr Intravenous Continuous Aries Albert MD   Held at 24    norepinephrine (LEVOPHED) 16 mg in  mL infusion MAX CONC CENTRAL LINE  0.01-0.6 mcg/kg/min (Dosing Weight) Intravenous Continuous Invasive CardioMD polo 5 mL/hr at 24 0.05 mcg/kg/min at 24    vasopressin 1 unit/mL MAX Conc (PITRESSIN) infusion  0.5-4 Units/hr Intravenous Continuous Invasive CardioMD polo 3 mL/hr at 24 08 3 Units/hr at 24 08       ALLERGIES:    Not on File    REVIEW OF SYSTEMS:  A 10 point review of systems was negative except as noted above.    SOCIAL HISTORY:   Social History     Socioeconomic History    Marital status:      Spouse name: Not on file    Number of children: Not on file    Years of education: Not on file    Highest education level: Not on file   Occupational History    Not on file   Tobacco Use    Smoking status: Not on file    Smokeless tobacco: Not on file   Substance and Sexual Activity    Alcohol use: Not on file    Drug use: Not on file    Sexual activity: Not on file   Other Topics Concern    Not on file   Social History Narrative    Not on file     Social Determinants of Health     Financial Resource Strain: Not on file   Food Insecurity: Not on file   Transportation Needs: Not on file   Physical Activity: Not on file   Stress: Not on file   Social Connections: Not on file   Interpersonal Safety: Not on file   Housing Stability: Not on file     Unable to review due to vent/sedation.     FAMILY MEDICAL HISTORY:   Unable to review due to vent/sedation.     PHYSICAL EXAM:   Temp  Av.5  F (36.4  C)  Min: 90  F (32.2  C)   "Max: 98.8  F (37.1  C)  Arterial Line BP  Min: 97/44  Max: 138/60  Arterial Line MAP (mmHg)  Av.8 mmHg  Min: 57 mmHg  Max: 286 mmHg      Pulse  Av  Min: 70  Max: 104 Resp  Avg: 15  Min: 0  Max: 21  FiO2 (%)  Av %  Min: 50 %  Max: 70 %  SpO2  Av.9 %  Min: 89 %  Max: 100 %       Pulse 75   Temp 98.8  F (37.1  C)   Resp 14   Ht 1.8 m (5' 10.87\")   Wt 107.5 kg (236 lb 15.9 oz)   SpO2 95%   BMI 33.18 kg/m     Date 24 07 - 24 0659   Shift 0853-2148 2518-3052 8721-0324 24 Hour Total   INTAKE   I.V. 78   78   NG/GT 30   30   Shift Total(mL/kg) 108(1)   108(1)   OUTPUT   Urine 0   0   Stool 50   50   Shift Total(mL/kg) 50(0.47)   50(0.47)   Weight (kg) 107.5 107.5 107.5 107.5      Admit Weight: 107.4 kg (236 lb 12.4 oz)     General: In bed, in NAD  HEENT: PERRL, no scleral icterus or injection  CARDIAC: RRR, no m/r/g appreciated. Peripheral pulses dopplered  RESP: Mechanical ventilation; CTAB, no wheezes, rhonchi or crackles appreciated.  GI: soft, BS hypoactive  : Montesinos  EXTREMITIES: NO LE edema, pulses 2+. Femoral access site w/o bleeding, dressing c/d/i.   SKIN: No acute lesions appreciated  NEURO: Intubated and sedated    LABS:   CMP  Recent Labs   Lab 24  0811 24  0558 24  0413 24  0406 24  2359 24  2339 24  1932 24  1906 24  1726   0000   NA  --   --   --  143  --  140  --  140 137  --    POTASSIUM  --   --   --  3.8  --  3.4  --  3.5 3.4  --    CHLORIDE  --   --   --  109*  --  106  --  106  --   --    CO2  --   --   --  14*  --  13*  --  13*  --   --    ANIONGAP  --   --   --  20*  --  21*  --  21*  --   --    * 127* 112* 116*  124*   < > 91  96   < > 85 135*   < >   BUN  --   --   --  13.3  --  11.4  --  9.8  --   --    CR  --   --   --  3.39*  --  3.20*  --  2.90*  --   --    GFRESTIMATED  --   --   --  13*  --  14*  --  16*  --   --    NAVI  --   --   --  8.3  --  7.8*  --  7.6*  --   --    MAG  --   --   --  " 2.1  --  2.2  --   --   --   --    PHOS  --   --   --  4.9*  --   --   --   --   --   --    PROTTOTAL  --   --   --  4.1*  --  4.3*  --  4.5*  --   --    ALBUMIN  --   --   --  2.1*  --  2.1*  --  2.2*  --   --    BILITOTAL  --   --   --  1.0  --  0.8  --  0.6  --   --    ALKPHOS  --   --   --  73  --  77  --  86  --   --    AST  --   --   --  498*  --  418*  --  244*  --   --    ALT  --   --   --  48  --  52  --  44  --   --     < > = values in this interval not displayed.     CBC  Recent Labs   Lab 06/13/24 0406 06/12/24 2339 06/12/24  1906 06/12/24  1726   HGB 8.5* 7.7* 8.5* 5.7*   WBC 27.1* 25.1* 16.2*  --    RBC 2.77* 2.47* 2.75*  --    HCT 26.9* 24.2* 27.2*  --    MCV 97 98 99  --    MCH 30.7 31.2 30.9  --    MCHC 31.6 31.8 31.3*  --    RDW 17.1* 17.3* 16.7*  --     162 152  --      INR  Recent Labs   Lab 06/13/24 0406 06/12/24  2339   INR 2.17* 2.37*   PTT 49* 60*     ABG  Recent Labs   Lab 06/13/24  0742 06/13/24  0554 06/13/24  0445 06/13/24  0406 06/13/24  0156   PH 7.30* 7.28*  --  7.25* 7.23*   PCO2 30* 34*  --  34* 33*   PO2 171* 160*  --  131* 155*   HCO3 15* 16*  --  15* 14*   O2PER 50 50 50 50  50  50 50      URINE STUDIES  No lab results found.  No lab results found.  PTH  No lab results found.  IRON STUDIES  No lab results found.

## 2024-06-13 NOTE — PROGRESS NOTES
[facility]    ECLS Shift Summary:     ECMO Equipment:             Circuit Assessment: Free of fibrin, clot, and air    Arterial ECMO Cannula: 17 Fr in the Right Femoral Artery  Venous ECMO Cannula: 25 Fr in the Right Femoral Vein  Distal Perfusion Catheter: 8 Fr    ECMO Cannula Arterial Right femoral artery-Site Assessment: WDL, Bleeding  ECMO Cannula Venous Right femoral vein-Site Assessment: WDL  Distal Perfusion Catheter Right superficial femoral artery-Site Assessment: WDL, Bleeding  ECMO Cannula Arterial Right femoral artery-Site Intervention: No intervention needed  ECMO Cannula Venous Right femoral vein-Site Intervention: No intervention needed  Distal Perfusion Catheter Right superficial femoral artery-Site Intervention: No intervention needed    Patient remains on V-A ECMO, all equipment is functioning and alarms are appropriately set. RPM's: 3700 with Blood Flow (Circuit) LPM  Av.6 LPM  Min: 4.58 LPM  Max: 4.66 LPM L/min. Sweep is at 0.5 LPM and 50 %. Extremities are cool.     Significant Shift Events: none    Vent settings:  Vent Mode: CMV/AC  (Continuous Mandatory Ventilation/ Assist Control)  FiO2 (%): 50 %  Resp Rate (Set): 10 breaths/min  Tidal Volume (Set, mL): 450 mL  PEEP (cm H2O): 7 cmH2O  Resp: 13      Anticoagulation:  Dose (units/hr) HEParin: 500 Units/hr  Rate (mL/hr) HEParin: 5 mL/hr  Concentration HEParin: 100 Units/mL        Most recent: ACT  (seconds): 182 seconds    Urine output is none.  .  Blood loss was per chart. Product given included 1u PRBCs.     Intake/Output Summary (Last 24 hours) at 2024 1813  Last data filed at 2024 1800  Gross per 24 hour   Intake 4071.88 ml   Output 1605 ml   Net 2466.88 ml       Labs:  Recent Labs   Lab 24  1748 24  1529 24  1351 24  1137   PH 7.27* 7.28* 7.30* 7.30*   PCO2 40 35 32* 31*   PO2 176* 170* 198* 193*   HCO3 18* 17* 16* 15*   O2PER 50 50  50  50 50 50       Lab  Results   Component Value Date    HGB 8.1 (L) 06/13/2024    PHGB <30 06/13/2024     (L) 06/13/2024    FIBR 244 06/13/2024    INR 2.04 (H) 06/13/2024    PTT 48 (H) 06/13/2024    DD 9.79 (H) 06/13/2024    ANTCH 41 (L) 06/13/2024       Plan is continue VA Ecmo.    Chyu Sarabia RN  ECMO Specialist  6/13/2024 6:13 PM

## 2024-06-13 NOTE — PROGRESS NOTES
ECMO Attending Progress Note  2024    Ismael Hodgson is a 89 year old male who was cannulated for ECMO 2024 due to refractory VF arrest    Cannulation Site:  17 Fr in the R femoral artery  25 Fr in the R femoral vein    Interval events: stabilized hemodynamics, still on 3 pressors but reduced     Pulsatilty (IABP paused if applicable):20 mmHG     Physical Exam:  Temp:  [90  F (32.2  C)-98.8  F (37.1  C)] 98.8  F (37.1  C)  Pulse:  [] 76  Resp:  [0-21] 18  MAP:  [57 mmHg-286 mmHg] 60 mmHg  Arterial Line BP: ()/(36-60) 102/40  FiO2 (%):  [50 %-70 %] 50 %  SpO2:  [89 %-100 %] 97 %    Intake/Output Summary (Last 24 hours) at 2024 1159  Last data filed at 2024 1100  Gross per 24 hour   Intake 4416.22 ml   Output 1405 ml   Net 3011.22 ml      Vent Mode: CMV/AC  (Continuous Mandatory Ventilation/ Assist Control)  FiO2 (%): 50 %  Resp Rate (Set): 16 breaths/min  Tidal Volume (Set, mL): 450 mL  PEEP (cm H2O): 7 cmH2O  Resp: 18       Labs:  Recent Labs   Lab 24  0948 24  0742 24  0554 24  0445 24  0406   PH 7.30* 7.30* 7.28*  --  7.25*   PCO2 30* 30* 34*  --  34*   PO2 166* 171* 160*  --  131*   HCO3 15* 15* 16*  --  15*   O2PER 50 50 50 50 50  50  50      Recent Labs   Lab 24  0948 24  0406 24  2339 24  1906   WBC 27.6* 27.1* 25.1* 16.2*   HGB 7.8* 8.5* 7.7* 8.5*     Creatinine   Date Value Ref Range Status   2024 3.75 (H) 0.67 - 1.17 mg/dL Final   2024 3.39 (H) 0.67 - 1.17 mg/dL Final   2024 3.20 (H) 0.67 - 1.17 mg/dL Final   2024 2.90 (H) 0.67 - 1.17 mg/dL Final   Blood Flow (Circuit) LPM: 4.66 LPM  Sweep LPM: 0.5 LPM  Sweep FiO2   %: 50 %  ACT  (seconds): 190 seconds  Pulse Oximetry  (SpO2%): 95 %  Arterial Pressure  mmH mmHg    ECMO Issues including assessments and plan on DOS 2024:  Neuro: Sedated for mechanical ventilation and ECMO.  No acute distress.  NIRS stable b/l  RASS goal: -3  CV:  Cardiogenic shock.  Hemodynamically stable on multiple moderate pressors  Pulm: Keep vent settings at rest settings as above.  FEN/Renal: Electrolytes stable w/ replacement protocols in place, Cr stable, UOP stable  Heme: ACT goal: 140-160, Hemoglobin stable.  Minimal oozing around the ECMO cannulas.  ID: Receiving empiric antibiotics  Cannulae: Position is acceptable on exam and the available imaging.  Distal perfusion cannula is in place and patent.  Extremities are well-perfused.     I have personally reviewed the ECMO flows, oxygenation and CO2 clearance, anticoagulation, and cannula position.  I have also personally assessed the patient's systemic response with hemodynamics, oxygenation, ventilation, and bleeding.       The patient requires continued ECMO support and management in the ICU.  I have discussed patient care and treatment plan with the primary team.      Paramjit Henley MD, PhD  Interventional/Critical Care Cardiology  [phone]    June 13, 2024
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
## 2024-06-13 NOTE — PROGRESS NOTES
Shift Summary [id number]        Neuro: no sedation, pupils 5mm-NPI 0, no gag/withdrawal, overbreaths vent    CV:   Rate/rhythm:  V paced , DDDR    Mechanical:  IABP 1:1 100%(LFem)  VA ECMO 4.5lpm/3700/0.5LPM/50%FiO2(Rfem)  Lost DP and post tib pulses to LLE, providers made aware, instructed to start straight rate heparin gtt.     Pulm:  CMV 50%/450/7/10, dim, tan/blood streaked   T/o shift patients gases consistently acidotic, team made aware, sweep adjusted, no bicarb given.     GI: soft belly, sloughed bowels on arrival- GI bleeding, rectal tube , OG     : fistula on left, anuric, awaiting CRRT    Skin: scales, skin tear right arm, IO in skin left shoulder, plaque and discolorations on BLE , scattered bruising -  no new skin issues present during shift

## 2024-06-13 NOTE — PROGRESS NOTES
"CLINICAL NUTRITION SERVICES - ASSESSMENT NOTE     Nutrition Prescription    RECOMMENDATIONS FOR MDs/PROVIDERS TO ORDER:  Recommend initiating EN via OGT within 48 hours. Please consult nutrition.     Malnutrition Status:    Patient does not meet two of the established criteria necessary for diagnosing malnutrition    Recommendations already ordered by Registered Dietitian (RD):  None at this time    Future/Additional Recommendations:  EN recommendations:   --If renal formula needed: Novasource Renal (or equivalent) @ 35 ml/hr (840 ml) + 5 pkt Prosource TF20 provides 2080 kcal (24 kcal/kg), 176 g pro (2 g/kg), 154 g CHO, 602 ml free water, and 0 g fiber daily.      --- Initiate @ 15 ml/hr and advance by 10 ml q8hr as tolerated  --If standard formula needed: Osmolite 1.5 Mark (or equivalent) @ goal of 50ml/hr (1200ml/day) + 5 pkt Prosource TF20 provides: 2200 kcals (25 kcal/kg), 175 g PRO (2 g/kg), 914 ml free H20, 244 g CHO, and 0 g fiber daily.     --Initiate @ 20 ml/hr and advance by 15 ml q8hr as tolerated    - Do not start or advance until lytes (Mg++,K+) WNL and phos>2.0  - Recommend 30 ml q4hr fluid flushes for tube patency. Additional fluids and/or adjustments per MD.    - Order multivitamin/mineral (15 ml/day via FT) to help ensure micronutrient needs being met with suspected hypermetabolic demands and potential interruptions to TF infusions.  - Elevated HOB with gastric feeds      REASON FOR ASSESSMENT  Tyler Dexter Marrufo is a/an 64 year old male assessed by the dietitian for Nutrition Risk Monitoring    NUTRITION HISTORY  Unable to obtain nutrition hx at this time as pt intubated.     CURRENT NUTRITION ORDERS  Diet: NPO    LABS  Labs reviewed - elevated Cr, elevated lactic acid     MEDICATIONS  Medications reviewed - epi @ 0.03, levo @ 0.55, vaso @ 4    ANTHROPOMETRICS  Height: 180 cm (5' 10.866\")  Most Recent Weight: 107.5 kg (236 lb 15.9 oz)    IBW: 78.2 kg  BMI: Obesity Grade I BMI 30-34.9  Weight " "History: None on file     Dosing Weight: 88 kg (adjusted BW based on IBW and actual lowest BW of 107 kg)    ASSESSED NUTRITION NEEDS  Estimated Energy Needs: 1197-6401 kcals/day (22 - 25 kcals/kg)  Justification: vented, obese  Estimated Protein Needs: 175-220 grams protein/day (2 - 2.5 grams of pro/kg)  Justification: ECMO  Estimated Fluid Needs: 2650 mL/day (1 mL/kcal)   Justification: Maintenance    PHYSICAL FINDINGS  See malnutrition section below.  No abnormal nutrition-related physical findings observed.     MALNUTRITION  % Intake: Unable to assess  % Weight Loss: Unable to assess  Subcutaneous Fat Loss: None observed  Muscle Loss: None observed  Fluid Accumulation/Edema: Mild  Malnutrition Diagnosis: Patient does not meet two of the established criteria necessary for diagnosing malnutrition    NUTRITION DIAGNOSIS  Inadequate oral intake related to intubation as evidenced by NPO status.       INTERVENTIONS  Implementation  Enteral Nutrition - recommendations above      Goals  Diet adv v nutrition support within 48 hours.     Monitoring/Evaluation  Progress toward goals will be monitored and evaluated per protocol.  Yanna Thao, MS, RD, LD  Available on [facility] - \"4A Clinical Dietitian\"  Weekend/Holiday RD - \"Weekend Clinical Dietitian\"  "

## 2024-06-13 NOTE — PROGRESS NOTES
ECMO Attending Progress Note  2024    Ismael Hodgson is a 89 year old male who was cannulated for ECMO 2024 due to refractory VF arrest    Cannulation Site:  17 Fr in the R femoral artery  25 Fr in the R femoral vein    Interval events: stabilized hemodynamics, still on 3 pressors    Pulsatilty (IABP paused if applicable):5 mmHG     Physical Exam:  Temp:  [90  F (32.2  C)-97.2  F (36.2  C)] 97.2  F (36.2  C)  Pulse:  [] 103  Resp:  [6-21] 15  MAP:  [64 mmHg-286 mmHg] 64 mmHg  Arterial Line BP: (104-138)/(42-60) 105/43  FiO2 (%):  [50 %-70 %] 50 %  SpO2:  [89 %-100 %] 99 %    Intake/Output Summary (Last 24 hours) at 2024 2350  Last data filed at 2024 2300  Gross per 24 hour   Intake 1911.58 ml   Output 505 ml   Net 1406.58 ml    Vent Mode: (S) CMV/AC  (Continuous Mandatory Ventilation/ Assist Control)  FiO2 (%): 50 %  Resp Rate (Set): (S) 16 breaths/min  Tidal Volume (Set, mL): (S) 450 mL  PEEP (cm H2O): (S) 7 cmH2O  Resp: 15       Labs:  Recent Labs   Lab 24  1647   PH 7.37  --   --  7.27* 6.95* <6.75*   PCO2 24*  --   --  31* 51* >130*   PO2 200*  --   --  131* 245* 22*   HCO3 14*  --   --  14* 11*  --    O2PER 70 50 50 70 55.0 100.0      Recent Labs   Lab 24  17224  1647   WBC 16.2*  --   --    HGB 8.5* 5.7* 6.2*     Creatinine   Date Value Ref Range Status   2024 2.90 (H) 0.67 - 1.17 mg/dL Final     Blood Flow (Circuit) LPM: 4.52 LPM  Sweep LPM: (S) 2.5 LPM  Sweep FiO2   %: 50 %  ACT  (seconds): 211 seconds  Pulse Oximetry  (SpO2%): 98 %  Arterial Pressure  mmH mmHg    ECMO Issues including assessments and plan on DOS 2024:  Neuro: Sedated for mechanical ventilation and ECMO.  No acute distress.  NIRS stable b/l  RASS goal: -3  CV: Cardiogenic shock.  Hemodynamically stable on multiple moderate pressors  Pulm: Keep vent settings at rest settings as  above.  FEN/Renal: Electrolytes stable w/ replacement protocols in place, Cr stable, UOP stable  Heme: ACT goal: 180-200, Hemoglobin stable .  Minimal oozing around the ECMO cannulas.  ID: Receiving empiric antibiotics  Cannulae: Position is acceptable on exam and the available imaging.  Distal perfusion cannula is in place and patent.  Extremities are well-perfused.     I have personally reviewed the ECMO flows, oxygenation and CO2 clearance, anticoagulation, and cannula position.  I have also personally assessed the patient's systemic response with hemodynamics, oxygenation, ventilation, and bleeding.       The patient requires continued ECMO support and management in the ICU.  I have discussed patient care and treatment plan with the primary team.      Paramjit Henley MD, PhD  Interventional/Critical Care Cardiology  [phone]    June 12, 2024

## 2024-06-13 NOTE — PHARMACY-VANCOMYCIN DOSING SERVICE
Pharmacy Vancomycin Initial Note  Date of Service 2024  Patient's  1902  89 year old, male    Indication: Aspiration Pneumonia    Current estimated CrCl = CrCl cannot be calculated (No successful lab value found.).  -------HEMODIALYSIS DEPENDENT prior to admission    Creatinine for last 3 days  No results found for requested labs within last 3 days.    Recent Vancomycin Level(s) for last 3 days  No results found for requested labs within last 3 days.      Vancomycin IV Administrations (past 72 hours)        No vancomycin orders with administrations in past 72 hours.                    Nephrotoxins and other renal medications (From now, onward)      Start     Dose/Rate Route Frequency Ordered Stop    24  norepinephrine (LEVOPHED) 16 mg in  mL infusion MAX CONC CENTRAL LINE         0.01-0.6 mcg/kg/min × 107.4 kg (Dosing Weight)  1-60.4 mL/hr  Intravenous CONTINUOUS 24  vasopressin 1 unit/mL MAX Conc (PITRESSIN) infusion         0.5-4 Units/hr  0.5-4 mL/hr  Intravenous CONTINUOUS 24  vancomycin (VANCOCIN) 2,250 mg in sodium chloride 0.9 % 250 mL intermittent infusion         2,250 mg (central catheter)  over 90 Minutes Intravenous ONCE 24  vancomycin place cardenas - receiving intermittent dosing         1 each Intravenous SEE ADMIN INSTRUCTIONS 24  vasopressin (VASOSTRICT) 20 Units in sodium chloride 0.9 % 100 mL standard conc infusion         0.5-4 Units/hr  2.5-20 mL/hr  Intravenous CONTINUOUS 24 1938              Contrast Orders - past 72 hours (72h ago, onward)      Start     Dose/Rate Route Frequency Stop    24  iopamidol (ISOVUE-370) solution  Status:  Discontinued           ONCE PRN 24 190            InsightRX Prediction of Planned Initial Vancomycin Regimen  N/A        Plan:  Start vancomycin  2250 mg IV once, then intermittent dosing  pending HD/renal function.   Vancomycin monitoring method: Trough (Method 2 = manual dose calculation)  Vancomycin therapeutic monitoring goal: 15-20 mg/L  Pharmacy will check vancomycin levels as appropriate in 1-3 Days.    Serum creatinine levels will be ordered daily for the first week of therapy and at least twice weekly for subsequent weeks.      Mary Ruiz [facility]

## 2024-06-13 NOTE — PROGRESS NOTES
[facility]    ECLS Shift Summary:     ECMO Equipment:  Console Serial Number: [id number]  Circuit Lot Number: [id number]  Oxygenator Lot Number: [id number]    Circuit Assessment: Free of fibrin, clot, and air    Arterial ECMO Cannula: 17 Fr in the Right Femoral Artery  Venous ECMO Cannula: 25 Fr in the Right Femoral Vein  Distal Perfusion Catheter: 8 Fr in the Right Superficial Femoral Artery    ECMO Cannula Arterial Right femoral artery-Site Assessment: WDL, Bleeding  ECMO Cannula Venous Right femoral vein-Site Assessment: WDL  Distal Perfusion Catheter Right superficial femoral artery-Site Assessment: WDL, Bleeding  ECMO Cannula Arterial Right femoral artery-Site Intervention: No intervention needed  ECMO Cannula Venous Right femoral vein-Site Intervention: No intervention needed  Distal Perfusion Catheter Right superficial femoral artery-Site Intervention: No intervention needed    Patient remains on V-A ECMO, all equipment is functioning and alarms are appropriately set. RPM's: 3700 with Blood Flow (Circuit) LPM  Av.6 LPM  Min: 4.52 LPM  Max: 4.64 LPM L/min. Sweep is at (S) 1 LPM and 50 %. Extremities are cool and mottled.     Significant Shift Events: Patient had a substantial amount of blood oozing from cannula sites as well as rectal tube output.  No circuit chugging overnight and patient remains off heparin.    Vent settings:  Vent Mode: CMV/AC  (Continuous Mandatory Ventilation/ Assist Control)  FiO2 (%): 50 %  Resp Rate (Set): 16 breaths/min  Tidal Volume (Set, mL): 450 mL  PEEP (cm H2O): 7 cmH2O  Resp: 16      Anticoagulation:  Dose (units/hr) HEParin: 0 Units/hr  Rate (mL/hr) HEParin: 0 mL/hr  Concentration HEParin: 100 Units/mL        Most recent: ACT  (seconds): 186 seconds    Urine output is minimal.  .  Blood loss was large from cannula sites and rectal tube. Product given included 2 units PRBCs and 1 L of LR.     Intake/Output Summary (Last 24 hours)  at 6/13/2024 0620  Last data filed at 6/13/2024 0600  Gross per 24 hour   Intake 4196.39 ml   Output 1255 ml   Net 2941.39 ml       Labs:  Recent Labs   Lab 06/13/24  0554 06/13/24  0445 06/13/24  0406 06/13/24  0156 06/12/24  2339   PH 7.28*  --  7.25* 7.23* 7.32*   PCO2 34*  --  34* 33* 27*   PO2 160*  --  131* 155* 119*   HCO3 16*  --  15* 14* 14*   O2PER 50 50 50  50  50 50 50       Lab Results   Component Value Date    HGB 8.5 (L) 06/13/2024    PHGB <30 06/13/2024     06/13/2024    FIBR 212 06/13/2024    INR 2.17 (H) 06/13/2024    PTT 49 (H) 06/13/2024    DD 12.74 (H) 06/13/2024       Plan is for repeat head CT today.    Shonda Donovan, RT  ECMO Specialist  6/13/2024 6:20 AM

## 2024-06-13 NOTE — CONSULTS
Palliative Care Consultation Note  [facility]    Patient: Tyler Renteria Jr.  Date of Admission:  6/12/2024    Requesting Clinician / Team: CICU  Reason for consult: Patient and family support     Recommendations & Counseling     GOALS OF CARE:   Restorative without limits     ADVANCE CARE PLANNING:  Family is unsure if he had an HCD, recommended checking with brother Eduardo.  NOK are daughters Chica and Trupti, brother Eduardo has also been very involved in his medical care. Daughters not present today - siblings at bedside recommended we hold off on contacting them due to current stressors.  Code status: Full Code    PSYCHOSOCIAL/SPIRITUAL SUPPORT:  Family - supported by daughters Chica and Trupti (of note, their mother passed less than 2 years ago), siblings Eduardo, Stacey and Xochitl as well as mother Marivel. Sorin has 2 granddaughters, ages 4 and 23 y/o.  Xiomara - does not identify as Anglican    Palliative Care will continue to follow peripherally.    Vicky Garcia PA-C  [facility], Palliative Care  Securely message with the Vocera Web Console (learn more here) or  Text page via [facility] Paging/Directory     Assessment      Sorin is a 63 y/o man with history of ESRD on HD, CAD s/p CABG x2 and multiple PCIs, ICM with HFrEF, s/p PPM, recent admission for decompensated heart failure and GI bleed, admitted for VA ECMO after cardiac arrest at rehab, total downtime 1 hr, s/p cath without intervention. Palliative consulted for family support in setting of ECMO cannulation.    Today, the patient was seen for:  Palliative encounter  Patient and family support  Cardiac arrest  HFrEF  Concern for anoxic brain injury    History of Present Illness   Met with sisters and mom at bedside today. Sorin was receiving cares from nursing, intubated and sedated.     Introduced the role of palliative care as an interdisciplinary team that cares for patients with serious illness to help  support symptom management, communication, coping for patients and their families as well as support with medical decision making.    Prognosis, Goals, & Planning:   Functional Status just prior to this current hospitalization:  Family shared how much of a shock this has been as Don was recovering from recent hospital stay at rehab when he arrested. Prior to recent hospitalization, he was living independently at home and remodeling his house.     Prognosis, Goals, and/or Advance Care Planning:  Not discussed - decision makers not present    Code Status was addressed today:   No    Patient's decision making preferences: unable to assess        Patient has decision-making capacity today for complex decisions: Unreliable          Coping, Meaning, & Spirituality:   Mood, coping, and/or meaning in the context of serious illness were addressed today: No    Social:   Living situation:lives alone  Important relationships/caregivers:daughters, granddaughters, siblings, mom  Occupation: retired, previously ran [employer]  Areas of fulfillment/nicole: remodeling his home, good food    Physical Exam   Vital Signs with Ranges  Temp:  [90  F (32.2  C)-98.8  F (37.1  C)] 98.4  F (36.9  C)  Pulse:  [] 80  Resp:  [0-21] 18  MAP:  [57 mmHg-286 mmHg] 67 mmHg  Arterial Line BP: ()/(36-60) 110/43  FiO2 (%):  [50 %-70 %] 50 %  SpO2:  [89 %-100 %] 100 %  Wt Readings from Last 10 Encounters:   06/13/24 107.5 kg (236 lb 15.9 oz)     236 lbs 15.91 oz    PHYSICAL EXAM:  General: intubated and sedated, ECMO cannulas in place    Data reviewed:  [facility] 6/12  Impression:  1.Chronic appearing old infarct in the right middle frontal gyrus  gliosis of the underlying subcortical white matter. Correlation with  prior imaging would be helpful if available.   2.Gray-white matter differentiation is mostly preserved apart from the  aforementioned chronic finding within the limits of the exam. However  virtual nonenhanced CT shows diffuse loss  of gray-white matter  differentiation loss throughout the parenchyma. This could be  artifactual. Recommend follow-up CT in 12-24 hours.    CMP  Recent Labs   Lab 06/13/24  1353 06/13/24  1143 06/13/24  0954 06/13/24  0948 06/13/24  0413 06/13/24  0406   NA  --   --   --  140  --  143   POTASSIUM  --   --   --  4.1  --  3.8   CHLORIDE  --   --   --  106  --  109*   CO2  --   --   --  14*  --  14*   ANIONGAP  --   --   --  20*  --  20*   * 147*   < > 146*  151*   < > 116*  124*   BUN  --   --   --  15.6  --  13.3   CR  --   --   --  3.75*  --  3.39*   GFRESTIMATED  --   --   --  17*  --  13*   NAVI  --   --   --  8.5*  --  8.3   MAG  --   --   --  2.1  --  2.1   PHOS  --   --   --   --   --  4.9*   PROTTOTAL  --   --   --  3.9*  --  4.1*   ALBUMIN  --   --   --  1.9*  --  2.1*   BILITOTAL  --   --   --  1.0  --  1.0   ALKPHOS  --   --   --  67  --  73   AST  --   --   --  512*  --  498*   ALT  --   --   --  50  --  48    < > = values in this interval not displayed.     CBC  Recent Labs   Lab 06/13/24 0948 06/13/24 0406   WBC 27.6* 27.1*   RBC 2.51* 2.77*   HGB 7.8* 8.5*   HCT 24.3* 26.9*   MCV 97 97   MCH 31.1 30.7   MCHC 32.1 31.6   RDW 17.7* 17.1*   * 153     Medical Decision Making       45 MINUTES SPENT BY ME on the date of service doing chart review, history, exam, documentation & further activities per the note.

## 2024-06-13 NOTE — PROGRESS NOTES
Pt came in intubated with 8 ETT; secured 25@ lip.  Initial vent settings Vent Mode: (S) CMV/AC  (Continuous Mandatory Ventilation/ Assist Control)  FiO2 (%): 60 %  Resp Rate (Set): (S) 16 breaths/min  Tidal Volume (Set, mL): (S) 450 mL  PEEP (cm H2O): (S) 7 cmH2O  Resp: 15  Sylvain Oh, RT on 6/12/2024 at 11:05 PM

## 2024-06-13 NOTE — PLAN OF CARE
Major Shift Events 7024-6521:    2RBC, 1L LR, 150mEq Sodium Bicarb, 1g Calcium. Continued on VA ECMO 4.5lpm, 3700rpm/1lpm/50%FiO2. IABP 1:1 100%. EEG placed, BLE and carotid US completed, unable to doppler pulses. Bleeding slowing from lines and orifices, ongoing bloody stool, rectal tube placed. No urine output. Pupils 6mm, non reactive, NPI 0, no gag or withdrawal, over breathing vent, no sedation.       Plan:   Continue current cares. Wean support as directed and tolerated. Possible follow up CT scan today. Consults for PPM interrogation and renal team. Please see MAR, flow sheets, and remainder of chart for further details. .

## 2024-06-13 NOTE — PROGRESS NOTES
Admitted/transferred from: post arrest- transfer from procedure and CT scan   Reason for admission/transfer: post arrest cares     2 RN skin assessment: completed by Ina RICHARDSON RN     Result of skin assessment and interventions/actions: Multiple small skin tears, scabs, and scatted bruising. Large skin tear on right forearm. Red abrasion high  mid-chest. Fistula left arm. Lines as charted in LDA avatar. Scaly legs with skin plaques, dusky, cool, discolored.  Flaking skin on all limbs. Blanchable redness on back and perineum. Bleeding from nose, in mouth no yet identified lesions. Bleeding/stooling from rectum.      Height, weight, drug calc weight: Done    Patient belongings (see Flowsheet)- None with patient, family brought in ID, scanned  a copy for chart and admissions, ID given back to family, clothing that was partially cut off patient searched for belongings, none/no pockets, destroyed clothing was disposed of.      MDRO education added to care planN/A  ?

## 2024-06-13 NOTE — PROGRESS NOTES
EEG CLINICAL NEUROPHYSIOLOGY PRELIMINARY REPORT    First hour of video-EEG reviewed. As best as can be gathered from EMR, T max 96.6 and no sedative drips employed. Severe attenuation of electrocerebral activity. No clear electrocerebral activity for long periods of recording even at high sensitivities (2 uV/mm). No seizures.    Findings consistent with profound diffuse encephalopathy. No clear EEG activity for long periods of recording, even at high sensitivities. Findings may in part be related to hypothermia; however temperatures reported not likely to account for observed findings. Thus far no electrographic seizures. Will continue video-EEG recording. Full report to follow.    Kwaku Luna MD  Contact information for physicians covering Epilepsy and EEG is available on [facility].  Click search, enter neurology adult/[facility] in group name box, click on neurology adult/ummc, then click Staff Epilepsy and EEG.
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
## 2024-06-14 NOTE — PLAN OF CARE
Pt continues to have fixed and dilated pupils bilaterally. Unresponsive, no cough. No changes to vent setttings. Continues to occasionally overbreathe set vent rate at 10-14 (set at 10), slowed down all night. Paced rhythm in 80s, able to doppler all pulses, keeping MAP>65. Gave 2u pRBCs to keep hgb >8. Remains on low dose levo , epi, and vaso gtts, Heparin continues at 500. ECMO sweep increased to 1, 50%, flows 4.65, speed 3700. Rectal tube op 100ml overnight, no lytes replaced.  Plans to take pt to CT today when RR=set vent rate. Will continue to update team with any changes in condition per protocol.

## 2024-06-14 NOTE — PROGRESS NOTES
[facility]    ECLS Shift Summary:     ECMO Equipment:  Console Serial Number: [id number]  Circuit Lot Number: [id number]  Oxygenator Lot Number: [id number]    Circuit Assessment: Free of fibrin, clot, and air    Arterial ECMO Cannula: 17 Fr in the Right Femoral Artery  Venous ECMO Cannula: 25 Fr in the Right Femoral Vein  Distal Perfusion Catheter: 8 Fr in the Right Superficial Femoral Artery            Patient remains on V-A ECMO, all equipment is functioning and alarms are appropriately set. RPM's: 3700 with Blood Flow (Circuit) LPM  Av.7 LPM  Min: 4.6 LPM  Max: 4.74 LPM L/min. Sweep is at 1 LPM and 50 %. Extremities are warm.     Significant Shift Events: CT of head    Vent settings:  Vent Mode: CMV/AC  (Continuous Mandatory Ventilation/ Assist Control)  FiO2 (%): 40 %  Resp Rate (Set): 10 breaths/min  Tidal Volume (Set, mL): 450 mL  PEEP (cm H2O): 7 cmH2O  Resp: 17      Anticoagulation:  Dose (units/hr) HEParin: 500 Units/hr  Rate (mL/hr) HEParin: 5 mL/hr  Concentration HEParin: 100 Units/mL        Most recent: ACT  (seconds): 169 seconds    Urine output is aneuric.  .  Blood loss was not seen. Product given included x1 prbc.     Intake/Output Summary (Last 24 hours) at 2024 1800  Last data filed at 2024 1700  Gross per 24 hour   Intake 1599.92 ml   Output 100 ml   Net 1499.92 ml       Labs:  Recent Labs   Lab 24  1552 24  1349 24  1153 24  0953   PH 7.36 7.36 7.35 7.35   PCO2 41 40 41 40   PO2 113* 128* 117* 119*   HCO3 23 23 22 22   O2PER 40  40  50 40 40 40       Lab Results   Component Value Date    HGB 7.8 (L) 2024    PHGB <30 2024     (L) 2024    FIBR 253 2024    INR 1.66 (H) 2024    PTT 46 (H) 2024    DD 3.77 (H) 2024    ANTCH 38 (L) 2024       Plan is continue [facility] support.    Feroz Estevez, RT  ECMO Specialist  2024 6:00 PM

## 2024-06-14 NOTE — PROGRESS NOTES
ECMO Attending Progress Note  2024    Ismael Hodgson is a 89 year old male who was cannulated for ECMO 2024 due to refractory VF arrest    Cannulation Site:  17 Fr in the R femoral artery  25 Fr in the R femoral vein    Interval events: stabilized hemodynamics, still on 3 pressors but reduced     Pulsatilty (IABP paused if applicable):20 mmHG     Physical Exam:  Temp:  [98.1  F (36.7  C)-98.8  F (37.1  C)] 98.8  F (37.1  C)  Pulse:  [68-92] 87  Resp:  [0-27] 17  BP: (100-103)/(43-51) 100/45  MAP:  [57 mmHg-80 mmHg] 71 mmHg  Arterial Line BP: ()/(36-55) 109/48  FiO2 (%):  [40 %-50 %] 40 %  SpO2:  [90 %-100 %] 100 %    Intake/Output Summary (Last 24 hours) at 2024 1043  Last data filed at 2024 1000  Gross per 24 hour   Intake 1741.58 ml   Output 300 ml   Net 1441.58 ml      Vent Mode: CMV/AC  (Continuous Mandatory Ventilation/ Assist Control)  FiO2 (%): 40 %  Resp Rate (Set): 10 breaths/min  Tidal Volume (Set, mL): 450 mL  PEEP (cm H2O): 7 cmH2O  Resp: 17       Labs:  Recent Labs   Lab 24  0953 24  0749 24  0611 24  0346   PH 7.35 7.34* 7.31* 7.26*   PCO2 40 40 42 47*   PO2 119* 147* 148* 140*   HCO3 22 22 21 21   O2PER 40 50 50 50  50  50      Recent Labs   Lab 24  0953 24  0346 24  2210 24  1529   WBC 20.7* 23.6* 23.0* 26.3*   HGB 8.5* 7.9* 7.6* 8.1*     Creatinine   Date Value Ref Range Status   2024 4.54 (H) 0.67 - 1.17 mg/dL Final   2024 4.23 (H) 0.67 - 1.17 mg/dL Final   2024 3.99 (H) 0.67 - 1.17 mg/dL Final   2024 3.75 (H) 0.67 - 1.17 mg/dL Final   Blood Flow (Circuit) LPM: 4.64 LPM  Sweep LPM: 1 LPM  Sweep FiO2   %: 50 %  ACT  (seconds): 190 seconds  Pulse Oximetry  (SpO2%): 99 %  Arterial Pressure  mmH mmHg    ECMO Issues including assessments and plan on DOS 2024:  Neuro: Sedated for mechanical ventilation and ECMO.  No acute distress.  NIRS stable b/l  RASS goal: -3  CV: Cardiogenic shock.   Hemodynamically stable on multiple low/moderate pressors  Pulm: Keep vent settings at rest settings as above.  FEN/Renal: Electrolytes stable w/ replacement protocols in place, Cr worsening, UOP reduced - will need CRRT today if continued care  Heme: ACT goal: 180-200, Hemoglobin stable.  Minimal oozing around the ECMO cannulas.  ID: Receiving empiric antibiotics  Cannulae: Position is acceptable on exam and the available imaging.  Distal perfusion cannula is in place and patent.  Extremities are well-perfused.     I have personally reviewed the ECMO flows, oxygenation and CO2 clearance, anticoagulation, and cannula position.  I have also personally assessed the patient's systemic response with hemodynamics, oxygenation, ventilation, and bleeding.       The patient requires continued ECMO support and management in the ICU.  I have discussed patient care and treatment plan with the primary team.      Paramjit Henley MD, PhD  Interventional/Critical Care Cardiology  [phone]    June 14, 2024

## 2024-06-14 NOTE — PROGRESS NOTES
Shift Summary:    Pt remained intubated and mechanically ventilated on the following vent settings:    Vent Mode: CMV/AC  (Continuous Mandatory Ventilation/ Assist Control)  FiO2 (%): 40 %  Resp Rate (Set): 10 breaths/min  Tidal Volume (Set, mL): 450 mL  PEEP (cm H2O): 7 cmH2O    PST: Not done - airway unstable/on ECMO/on pressors    Assessment: BS diminished bilaterally.     Ambu bag, mask, and valve present at bedside.     RT will continue to follow and monitor pt as appropriate.     Ariana Arredondo, RT on 6/14/2024 at 6:40 PM

## 2024-06-14 NOTE — PROGRESS NOTES
EEG CLINICAL NEUROPHYSIOLOGY PRELIMINARY REPORT    Video EEG through approximately 6 AM today reviewed.  As best as can be gathered from electronic medical record, patient not currently treated with sedative drips.  EEG continues severely suppressed.  No clear electrocerebral activity for long periods of recording, even at high sensitivities.  No seizures.    Severely abnormal.  No clear electrocerebral activity for long portions of recording.  Findings consistent with profound diffuse encephalopathy.  No seizures.    Full report to follow.    Kwaku Luna MD  Contact information for physicians covering Epilepsy and EEG is available on [facility].  Click search, enter neurology adult/[facility] in group name box, click on neurology adult/[facility], then click Staff Epilepsy and EEG.

## 2024-06-14 NOTE — PROGRESS NOTES
[facility]    ECLS Shift Summary:     ECMO Equipment:  Console Serial Number: [id number]  Circuit Lot Number: [id number]  Oxygenator Lot Number: [id number]    Circuit Assessment: Free of fibrin, clot, and air    Arterial ECMO Cannula: 17 Fr in the Right Femoral Artery  Venous ECMO Cannula: 25 Fr in the Right Femoral Vein  Distal Perfusion Catheter: 8 Fr in the Right Superficial Femoral Artery    ECMO Cannula Arterial Right femoral artery-Site Assessment: WDL, Sutured, Secure  ECMO Cannula Venous Right femoral vein-Site Assessment: WDL, Sutured, Secure  Distal Perfusion Catheter Right superficial femoral artery-Site Assessment: WDL  ECMO Cannula Arterial Right femoral artery-Site Intervention: Dressing changed/new dressing  ECMO Cannula Venous Right femoral vein-Site Intervention: Dressing changed/new dressing  Distal Perfusion Catheter Right superficial femoral artery-Site Intervention: Dressing changed/new dressing    Patient remains on V-A ECMO, all equipment is functioning and alarms are appropriately set. RPM's: 3700 with Blood Flow (Circuit) LPM  Av.7 LPM  Min: 4.7 LPM  Max: 4.74 LPM L/min. Sweep is at 1 LPM and 50 %. Extremities are cool.     Significant Shift Events: None    Vent settings:  Vent Mode: CMV/AC  (Continuous Mandatory Ventilation/ Assist Control)  FiO2 (%): 50 %  Resp Rate (Set): 10 breaths/min  Tidal Volume (Set, mL): 450 mL  PEEP (cm H2O): 7 cmH2O  Resp: 13      Anticoagulation:  Dose (units/hr) HEParin: 500 Units/hr  Rate (mL/hr) HEParin: 5 mL/hr  Concentration HEParin: 100 Units/mL        Most recent: ACT  (seconds): 203 seconds    Urine output is see I/O flowsheets.  .  Blood loss was moderate from rectal tube. Product given included 2 units of PRBCs.     Intake/Output Summary (Last 24 hours) at 2024 0611  Last data filed at 2024 0600  Gross per 24 hour   Intake 1512.41 ml   Output 450 ml   Net 1062.41 ml       Labs:  Recent Labs    Lab 06/14/24  0346 06/14/24  0200 06/14/24  0019 06/13/24  2210   PH 7.26* 7.27* 7.28* 7.37   PCO2 47* 44 42 32*   PO2 140* 132* 146* 176*   HCO3 21 20* 20* 18*   O2PER 50  50  50 50 50 50       Lab Results   Component Value Date    HGB 7.9 (L) 06/14/2024    PHGB <30 06/14/2024    PLT 97 (L) 06/14/2024    FIBR 249 06/14/2024    INR 1.88 (H) 06/14/2024    PTT 55 (H) 06/14/2024    DD 5.00 (H) 06/14/2024    ANTCH 41 (L) 06/13/2024       Plan is repeat head CT.    Shonda Donovan, RT  ECMO Specialist  6/14/2024 6:11 AM

## 2024-06-14 NOTE — PROGRESS NOTES
Cardiology ICU Progress Note    Brief HPI:  Tyler Renteria is 63 YO male with past medical history of end-stage kidney disease on hemodialysis MWF, coronary artery disease (status post CABG 2001, redo CABG 2011, multiple PCIs of unknown targets), afib, type 2 diabetes, ischemic cardiomyopathy (LVEF 30-35% on 4/19/2024) and trifascicular block status post PPM. He was recently admitted to [facility] from 5/21/24 to 5/31/24 for CHF exacerbation, hypotension, anemia and GI bleed. Underwent EGD 5/22/24 which showed 4 small non-bleeding angioectasias which were treated with APC and a single AVM in duodenum was treated with APC and clip. Colonoscopy on 5/24/24 showed multiple polyps and a large cecal polyp not amenable to removal endoscopically. Biopsy of mass consistent with tubulovillous adenoma. It was discussed that he would need hemicolectomy for removal of that mass but likely wouldn't tolerate the procedure currently as he didn't tolerate colonoscopy.     Yesterday, patient was [place] scooter on the freeway when he collapsed, CPR initiated. No-flow time around 5 minutes.  Received 16 shocks in total, rhythm was asystole and VT/VF, total downtime 1 hour.  Rhythm and Cath Lab was asystole. Underwent VA ECMO cannulation and placement of intra-aortic balloon pump.  Coronary angiogram showed  of the ostial circumflex with a patent vein graft to OM1 and OM 2, small to medium caliber LAD with patent LIMA to distal LAD graft, completely occluded large caliber diagonal vessel due to significant in-stent stenosis, diffusely diseased native RCA with extremely sluggish flow and a patent vein graft to RPDA. No intervention performed. Initial rhythm Asystole/VF.    Subjective and Interval:  Patient remains int he ICU, intubated and sedated. Overnight, no acute events. Lactate downtrended. Patient received 2u pRBCs to keep hgb >8, sweep increased to 1, flows at 4.5-4.7 LPM, rectal tube in place. increase PPI to BID,  regained pulses, nephro consulted, MRSA negative, intermittently lost pulses with adequate flow on ultrasound, sedation remains off, device check revealed 27 VT episodes recorded. Available data reveals VT episode first occurring at 1551 on 6/12/24. EGM shows AP/ markers at 122 bpm followed by monomorphic VT lasting >25 sec- rhythm then degrades into a disorganized ventricular arrhythmia and continues, resumed Heparin at 500 with ACTs 190-200, Pressors remain on at 0.03,  epi, 0.04 norepi, 3 Vaso, 15 mm hg pulsatility when IABP paused.    Assessment and plan by system:   Today's changes:  Discontinue Vanc  Repeat Head CT  Start ASA     Neurology   # Concern for anoxic brain injury    # Chronic appearing old infarct  - Intubated, sedated. Avoiding hyperthermia  - Neuro-crit consulted and appreciate recommendations.   - vEEG   - Trend S100 B and Neuron specific enolase   - Palliative care consulted.     Pupils 6mm, non reactive, NPI 0, no gag or withdrawal, no longer over breathing vent, not sedation.     CT head reveals Chronic appearing old infarct in the right middle frontal gyrus, gliosis of the underlying subcortical white matter. Correlation with prior imaging would be helpful if available, Gray-white matter differentiation is mostly preserved apart from the aforementioned chronic finding within the limits of the exam. However virtual nonenhanced CT shows diffuse loss of gray-white matter differentiation loss throughout the parenchyma. This could be artifactual. Recommend follow-up CT in 12-24 hours.    US Carotid less than 50% bilaterally     Current sedation:  RASS -2 to -3 once wakeful  None    Plan:  - Avoid sedation  - Spontaneous awakening trial daily as tolerated  - Permissive hypercapnea and hypernatremia for neuroprotection     Cardiovascular  # VF Cardiac arrest   # Cardiogenic shock   # CAD  # Cardiomyopathy  # Dyslipidemia  # CAD s/p CABG 2001, redo CABG 2011, multiple PCIs  # ICM - LVEF 30-35% on  2024  TTE: Pending  EKG: ECG Rhythm: Ventricular paced rhythm  Angiogram:   Ost Cx to Dist Cx lesion is 100% stenosed.    1st Diag lesion is 100% stenosed.    2nd Mrg lesion is 100% stenosed.    Central Venous Catheter  was placed. Ultrasound was used to visualize the left femoral vein. Placement was successful.    Arterial Line was placed. Ultrasound was used to visualize the right radial artery right radial artery. Placement was successful.     VT/V-fib arrest.  Successful ECMO cannulation with 17 [place] right common femoral arterial and 25 [place] right femoral venous cannulas.  Successful intra-aortic balloon pump placement via 9 [place] left common femoral arterial sheath.  Successful placement of triple-lumen central venous catheter in the left femoral vein.  Successful placement of right radial arterial line.        Severe multivessel coronary disease status post coronary bypass grafting.  Ostial circumflex is completely occluded with patent vein graft to OM1 and OM 2.  Small to medium caliber LAD with patent LIMA to distal LAD graft. Distal LAD supplies very small territory.  Completely occluded large caliber diagonal vessel due to significant in-stent restenosis.  Native RCA is diffusely disease with extremely sluggish flow.  Patent vein graft to RPDA.     ECMO:  RPM's: 3700 with Blood Flow (Circuit) LPM  Av.6 LPM    Min: 4.52 LPM  Max: 4.64 LPM L/min  Sweep is at (S) 1 LPM and 50 %.   Extremities are cool and mottled     Current Pressors/inotropes/antiarrythmic:    Dose (mcg/kg/min) Norepinephrine: 0.07 mcg/kg/min, Dose (units/hr) Vasopressin: 3 Units/hr, and Dose (mcg/kg/min) Epinephrine: 0.05 mcg/kg/min    Hemodynamics:  Art Line  Arterial Line BP: 100/45  Arterial Line MAP (mmHg): 67 mmHg  Arterial Line Location: Right radial  Art Line Wave Form: Appropriate wave forms      Plan:  - Start ASA when able  - Hold lipitor for now given shock liver  - Hold ACE/ARB for now given likely reduced renal  fxn after arrest  - Holding beta blocker given shock  - Telemetry monitoring  - Trending troponin      Pulmonary  # Acute hypoxemic respiratory failure requiring intubation  # Probable aspiration pneumonia  # Pulmonary edema    Vent Settings:   Vent Mode: CMV/AC  (Continuous Mandatory Ventilation/ Assist Control)  FiO2 (%): 50 %  Resp Rate (Set): 10 breaths/min  Tidal Volume (Set, mL): 450 mL  PEEP (cm H2O): 7 cmH2O  Resp: 10      ABG:   Recent Labs   Lab 06/14/24  0611 06/14/24  0346 06/14/24  0200   PH 7.31* 7.26* 7.27*   PCO2 42 47* 44   PO2 148* 140* 132*   HCO3 21 21 20*   O2PER 50 50  50  50 50       CXR: Pulmonary edema    CAP CT formal read pending    Plan:  - Wean vent as able  - Daily CXR  - Serial ABGs   - Consider scheduled duonebs if signs of lung dz, currently PRN    - Peridex        Gastrointestinal, Nutrition  # Concern for Shock liver 2/2 cardiac arrest  # Hypoalbuminemia   # Upper and lower GI bleed   No known medical hx.     CAP CT pending    Recent Labs   Lab 06/14/24 0346 06/13/24 2210 06/13/24  1529   * 437* 511*   ALT 46 48 48   BILITOTAL 0.7 0.8 1.1   ALBUMIN 2.0* 1.9* 2.0*   PROTTOTAL 3.9* 3.8* 3.9*   ALKPHOS 63 65 65       Diet NPO in immediate post arrest setting    Plan:  - Monitor LFTs  - Bowel regimen in place  - GI Prophylaxis: PPI; Protonix 40 mg daily     Renal, Electrolytes  # Lactic acidosis  # ESRD on HD MWF   I/O last 3 completed shifts:  In: 1812.41 [I.V.:882.41; NG/GT:30]  Out: 450 [Emesis/NG output:100; Stool:350]   Recent Labs   Lab 06/14/24  0346 06/13/24  2210 06/13/24  1529 06/13/24  0948 06/13/24  0406    140 142   < > 143   POTASSIUM 4.9 4.7 4.5   < > 3.8   CHLORIDE 108* 107 107   < > 109*   CO2 20* 17* 16*   < > 14*   BUN 24.3* 21.4 17.9   < > 13.3   CR 4.54* 4.23* 3.99*   < > 3.39*   PHOS 7.2*  --   --   --  4.9*   MAG 2.1 2.1 2.3   < > 2.1    < > = values in this interval not displayed.     NG/OG/NJ Tube Orogastric Center mouth-Flush/Free Water (mL):  30 mL    Plan:  - Avoid nephrotoxins, renally dose meds  - Monitor urine output    Infectious Disease  # Aspiration Pneumonia- in the setting of arrest.  CXR/CAP as above.   # Leukocytosis  Recent Labs   Lab 24  03424  1529   WBC 23.6* 23.0* 26.3*     Temp (24hrs), Av.4  F (36.3  C), Min:90  F (32.2  C), Max:98.8  F (37.1  C)      Cultures thus far:   NGTD  Antibiotics     Anti-infectives (From now, onward)      Start     Dose/Rate Route Frequency Ordered Stop    24  vancomycin place cardenas - receiving intermittent dosing         1 each Intravenous SEE ADMIN INSTRUCTIONS 24 190  cefTRIAXone (ROCEPHIN) 2 g vial to attach to  ml bag for ADULTS or NS 50 ml bag for PEDS         2 g  over 30 Minutes Intravenous EVERY 24 HOURS 24 18224                          Plan:  - Continue Ceftriaxone x 7D for aspiration coverage  -  Monitor for signs of infection  -  Avoid fevers  - MRSA neg  - discontinue Vanc     Hematology  # Anemia of critical illness  # Tubulovillous adenoma of the cecum   Recent Labs   Lab 24  1529   HGB 7.9* 7.6* 8.1*   HCT 24.8* 23.2* 25.4*   PLT 97* 105* 123*     Recent Labs   Lab 24  03424  1529   INR 1.88* 1.98* 2.04*   PTT 55* 58* 48*     Hgb stable. No e/o bleeding.    DVT PPX: Heparin infusion straight rate    Plan:  - CANDACE for DVT ppx  - Transfusion parameters:   - 1u PRBC for hbg < 8   - 1u platelet for plt < 50   - 1u FFP for INR > 3   - 5u cryoprecipitate for fibrinogen <150     Endocrinology  # Hyperglycemia   - No known medical history.   Hgb a1c   Recent Labs   Lab 24  1906   A1C 4.8     Recent Labs   Lab 24  0610 24  0353 24  0346 24  0210   * 146* 150*  157* 153*       Plan  - insulin gtt as needed    Integumentary:  - No skin issues    Lines/Tubes/Drains:  Arterial Line 24 Radial (Active)    Site Assessment WDL 06/14/24 0400   Line Status Pulsatile blood flow 06/14/24 0400   Arterine Line Cap Change Due 06/16/24 06/14/24 0400   Art Line Waveform Appropriate;Square wave test performed 06/14/24 0400   Art Line Interventions Leveled;Connections checked and tightened;Flushed per protocol 06/14/24 0400   Color/Movement/Sensation Cool fingers/toes;Dusky fingers/toes;Capillary refill greater than 3 sec 06/14/24 0400   Line Necessity Yes, meets criteria 06/14/24 0400   Dressing Type Transparent 06/14/24 0400   Dressing Status Clean, dry, intact 06/14/24 0400   Dressing Change Due 06/19/24 06/14/24 0400   Number of days: 2       Arterial Sheath  06/12/24 9 Fr Femoral (Active)   Specific Qualities Sutured;Stat Locked;Left in Place 06/14/24 0400   Site Assessment WDL 06/14/24 0400   Dressing Type Securing device;Biopatch;Sutured 06/14/24 0400   Dressing Intervention Dressing changed/new dressing 06/14/24 0000   Arterial Sheath Comment IABP 06/14/24 0400   Number of days: 2       CVC Triple Lumen Left Femoral Non - valved (open ended);Non - tunneled (Active)   Site Assessment WDL 06/14/24 0400   Dressing Chlorhexidine disk;Transparent 06/14/24 0400   Dressing Status clean;dry;intact 06/14/24 0400   Dressing Intervention dressing changed 06/14/24 0000   Dressing Change Due 06/21/24 06/14/24 0400   Line Necessity Yes, meets criteria 06/14/24 0400   Blue - Status infusing 06/14/24 0400   Blue - Cap Change Due 06/16/24 06/14/24 0400   Brown - Status infusing 06/14/24 0400   Brown - Cap Change Due 06/16/24 06/14/24 0400   White - Status infusing 06/14/24 0400   White - Cap Change Due 06/16/24 06/14/24 0400   Phlebitis Scale 0-->no symptoms 06/14/24 0400   Infiltration? no 06/14/24 0400   CVC Comment CDI 06/14/24 0400   Number of days:        ECMO Cannula Arterial Right femoral artery (Active)   Site Assessment WDL;Sutured;Secure 06/14/24 0400   Skin Assessment Clean;Dry;Intact 06/14/24 0400   Dressing Type  Silverlon;Ioban 06/14/24 0400   Dressing Status clean;dry;intact 06/14/24 0400   Site Intervention No intervention needed 06/14/24 0400   Number of days: 2       ECMO Cannula Venous Right femoral vein (Active)   Site Assessment WDL;Sutured;Secure 06/14/24 0400   Skin Assessment Clean;Dry;Intact 06/14/24 0400   Skin Intervention Foam dressing;Protective barrier applied 06/12/24 1748   Dressing Type Silverlon;Ioban 06/14/24 0400   Dressing Status clean;dry;intact 06/14/24 0400   Site Intervention No intervention needed 06/14/24 0400   Number of days: 2       Distal Perfusion Catheter Right superficial femoral artery (Active)   Site Assessment WDL 06/14/24 0400   Dressing Type Silverlon;Ioban 06/14/24 0400   Dressing Status clean;dry;intact 06/14/24 0400   Site Intervention No intervention needed 06/14/24 0400   Number of days: 2       Hemodialysis Vascular Access AV fistula Left Forearm (Active)   Site Assessment WDL 06/14/24 0400   Dressing Intervention Other (Comment) 06/13/24 0400   Dressing Status Intact 06/14/24 0400   Number of days:        ETT Cuffed 8 mm (Active)   Secured at (cm) 25 cm 06/14/24 0454   Measured from Lips 06/14/24 0454   Position Center 06/14/24 0454   Secured by Commercial tube cardenas 06/14/24 0454   Bite Block None Present 06/14/24 0454   Site Appearance Clean;Dry 06/14/24 0454   Tube Care Site care done 06/14/24 0400   Cuff Assessment Minimal leak technique 06/14/24 0454   Safety Measures Manual resuscitator/mask/valve in room 06/14/24 0454   Number of days: 2       NG/OG/NJ Tube Orogastric Center mouth (Active)   Site Description WDL 06/14/24 0400   Status Suction-low intermittent 06/14/24 0400   Drainage Appearance Dark Red 06/14/24 0400   Placement Confirmation [place] unchanged 06/14/24 0400   [place] (cm marking) at nare/mouth 70 cm 06/14/24 0400   Intake (ml) 0 ml 06/13/24 0400   Flush/Free Water (mL) 30 mL 06/13/24 0800   Container Amount 500 mL 06/14/24 0400   Output (ml) 0 ml  "06/14/24 0400   Number of days: 2       Rectal Tube With balloon (Active)   Balloon fill volume  45 cc 06/12/24 2000   Stool Leakage Small 06/14/24 0000   Rectal Tube Container Volume 700 ml 06/14/24 0400   Rectal Tube Output 0 ml 06/14/24 0400   Number of days: 2       Wound Arm (Active)   Wound Bed Bleeding;Drainage 06/13/24 2000   Renée-wound Assessment Dusky;Ecchymosis 06/13/24 2000   Drainage Amount Small 06/13/24 2000   Drainage Color/Characteristics Sanguinous 06/13/24 2000   Wound Care/Cleansing Wound cleanser;Barrier applied  06/13/24 2000   Dressing Non adherent 06/13/24 2000   Dressing Status Changed;New dressing 06/13/24 2000   Number of days: 2       Wound Sternum Friction injury;Abrasion(s) (Active)   Wound Bed Erythema, non-blanchable, skin intact;Erythema, blanchable 06/13/24 2000   Renée-wound Assessment Erythema 06/13/24 2000   Drainage Amount None 06/13/24 2000   Wound Care/Cleansing Wound cleanser 06/13/24 2000   Dressing Open to air 06/13/24 2000   Number of days: 2          ICU  Feeding: NPO in immediate post arrest setting  Analgesia:  None  Sedation: none  Thrombopx: Heparin infusion  Head of bed: reverse trend   Ulcers: PPI  Glucose: Insulin infusion if needed  Medically Ready for Discharge: Anticipated in 5+ Days       Family will be updated by me    Patient seen and discussed with staff physician Dr. Henley.    CHRIS Patel CNP  [facility]  ICU Cardiology-CICU Service  Send message or 10 digit call back number Securely via [employer] with the [employer] Web Console (learn more here)    Objective:  Most recent vital signs:  /45   Pulse 86   Temp 98.8  F (37.1  C)   Resp 10   Ht 1.8 m (5' 10.87\")   Wt 109.8 kg (242 lb 1 oz)   SpO2 100%   BMI 33.89 kg/m    Temp:  [98.1  F (36.7  C)-98.8  F (37.1  C)] 98.8  F (37.1  C)  Pulse:  [68-92] 86  Resp:  [0-27] 10  BP: (100-103)/(43-51) 100/45  MAP:  [57 mmHg-80 mmHg] 67 mmHg  Arterial Line BP: ()/(36-55) 100/45  FiO2 (%):  [50 " %] 50 %  SpO2:  [90 %-100 %] 100 %      Physical exam:  General: In bed, in NAD  HEENT: Pupils Not reactive  CARDIAC: RRR, no m/r/g appreciated. Peripheral pulses dopplered  RESP: Mechanical ventilation; CTAB, no wheezes, rhonchi or crackles appreciated.  GI: soft, BS hypoactive  : [place]  EXTREMITIES: NO LE edema, pulses 2+. Femoral access site w/o bleeding, dressing c/d/i.   SKIN: No acute lesions appreciated  NEURO: Intubated, no gag, pupils not reactive    Imaging/procedure results:  US Lower Extremity Arterial Duplex Bilateral  Narrative: ULTRASOUND LOWER EXTREMITY ARTERIAL DUPLEX BILATERAL  6/13/2024 6:49  PM    CLINICAL HISTORY: Loss of pulses even with pressors down and  normothermic.     COMPARISONS: Ultrasound lower extremity arterial duplex bilateral  6/12/2024    REFERRING PROVIDER: JULIANNE BROOKS    TECHNIQUE: Bilateral leg arteries evaluated with color Doppler and  Doppler waveform ultrasound    FINDINGS:     RIGHT:       Common femoral artery: OBSCURED       Profundus femoral artery: OBSCURED         Superficial femoral artery, origin: OBSCURED       Superficial femoral artery, mid thigh: 12/5 cm/s, tardus parvus        Superficial femoral artery, distal thigh: 9/2 cm/s, tardus parvus         Popliteal artery: 13/6 cm/s, tardus parvus         Posterior tibial artery, ankle: 11/4 cm/s, tardus parvus       Anterior tibial artery, ankle: 24/10 cm/s, tardus parvus    LEFT:       Common femoral artery: OBSCURED       Profundus femoral artery: OBSCURED         Superficial femoral artery, origin: OBSCURED       Superficial femoral artery, mid thigh: 20/5 cm/s, tardus parvus       Superficial femoral artery, distal thigh: 17/5 cm/s, tardus  parvus         Popliteal artery: 12/5 cm/s, tardus parvus         Posterior tibial artery, ankle: 9/2 cm/s, tardus parvus       Anterior tibial artery, ankle: 12/5 cm/s, tardus parvus  Impression: IMPRESSION:  1. Flow restored to the right anterior tibial artery.    2.  Waveforms suggest more proximal stenosis or occlusion. Slow  Dopplerable flow in bilateral posterior and anterior tibial arteries  at the ankles.    I have personally reviewed the examination and initial interpretation  and I agree with the findings.    INGE SOARES MD         SYSTEM ID:  [id number]  XR Chest Port 1 View  RESIDENT PRELIMINARY INTERPRETATION  IMPRESSION:  Stable support devices. Stable opacities likely representing pulmonary  edema and atelectasis.     Recent Results (from the past 24 hour(s))   Echocardiogram Complete   Result Value    LVEF  10-15% (severely reduced)    [facility]    [id number]  [id number]  [id number]  [id number]^MEI^INGE^NEGRO     [facility]  Echocardiography Laboratory  [address] [place] [address]     Name: JOANNA VILA JR. ALFA  MRN: [id number]  : 1960  Study Date: 2024 07:41 AM  Age: 64 yrs  Gender: Male  Patient Location: [facility]  Reason For Study: Cardiac Arrest  Ordering Physician: INGE HURLEY  Performed By: Mena Joseph RDCS     BSA: 2.2 m2  Height: 70 in  Weight: 236 lb  HR: 76  BP: 108/40 mmHg  ______________________________________________________________________________  Procedure  Complete Portable Echo Adult.  ______________________________________________________________________________  Interpretation Summary  VA ECMO 4.6 LPM, IABP  Left ventricular function is decreased. The ejection fraction is 10-15%  (severely reduced). Septum is deviated to the left.  Global right ventricular function is severely reduced.  The aortic valve opens with every cardiac cycle. No aortic regurgitation is  present.  No pericardial effusion is present.  ______________________________________________________________________________  Left Ventricle  Left ventricular size is normal. Left ventricular function is decreased. The  ejection fraction is 10-15% (severely reduced). Septum is deviated to the  left.     Right  Ventricle  Global right ventricular function is severely reduced. A right heart catheter  is noted in the right ventricle.     Atria  Mild biatrial enlargement is present.     Mitral Valve  The mitral valve is normal.     Aortic Valve  The aortic valve opens with every cardiac cycle. No aortic regurgitation is  present.     Tricuspid Valve  The tricuspid valve is normal. Trace tricuspid insufficiency is present.     Pulmonic Valve  Mild pulmonic insufficiency is present.     Vessels  Sinuses of Valsalva 4.3 cm. Ascending aorta 4.3 cm. Dilation of the inferior  vena cava is present with abnormal respiratory variation in diameter. Unable  to assess mean RA pressure given the patient is on a ventilator. ECMO cannula  is present in the IVC. IABP in the abdominal aorta.     Pericardium  No pericardial effusion is present.     Miscellaneous  A right pleural effusion is present.  ______________________________________________________________________________  MMode/2D Measurements & Calculations     RVDd: 5.4 cm  IVSd: 1.8 cm  LVIDd: 5.0 cm  LVIDs: 5.1 cm  LVPWd: 1.3 cm  FS: -1.6 %  LV mass(C)d: 338.4 grams  LV mass(C)dI: 151.1 grams/m2  Ao root diam: 4.3 cm  asc Aorta Diam: 4.3 cm  LVOT diam: 2.2 cm  LVOT area: 4.0 cm2  RVOT diam: 3.7 cm  Ao root diam index Ht(cm/m): 2.4  Ao root diam index BSA (cm/m2): 1.9  Asc Ao diam index BSA (cm/m2): 1.9  Asc Ao diam index Ht(cm/m): 2.4  RWT: 0.53     Doppler Measurements & Calculations  LV V1 max P.4 mmHg  LV V1 max: 59.6 cm/sec  LV V1 VTI: 6.0 cm     SV(LVOT): 23.9 ml  SI(LVOT): 10.7 ml/m2  Qp/Qs: 1.6/1.0     ______________________________________________________________________________  Report approved by: Paz Rose 2024 09:25 AM         Cardiac Device Check - Inpatient   Result Value    Date Time Interrogation Session 20,[id number]    Implantable Pulse Generator  Medtronic    Implantable Pulse Generator Model [id number] Tena DOMINGUEZ DR MRI    Implantable  Pulse Generator Serial Number [id number]    Type Interrogation Session In Clinic    Clinic Name [facility] [place] [facility]    Implantable Pulse Generator Type Pacemaker    Implantable Pulse Generator Implant Date 20,180,925    Implantable Lead  Medtronic    Implantable Lead Model 5076 CapSureFix Novus MRI SureScan    Implantable Lead Serial Number [id number]    Implantable Lead Implant Date 20,180,925    Implantable Lead Polarity Type Bipolar Lead    Implantable Lead Location Detail 1 UNKNOWN    Implantable Lead Location Right Atrium    Implantable Lead Connection Status Connected    Implantable Lead  Medtronic    Implantable Lead Model 5076 CapSureFix Novus MRI SureScan    Implantable Lead Serial Number [id number]    Implantable Lead Implant Date 20180925    Implantable Lead Polarity Type Bipolar Lead    Implantable Lead Location Detail 1 UNKNOWN    Implantable Lead Location Right Ventricle    Implantable Lead Connection Status Connected    Toni Setting Mode (NBG Code) DDDR    Toni Setting Lower Rate Limit 60    Toni Setting Maximum Tracking Rate 130    Toni Setting Maximum Sensor Rate 130    Toni Setting Hysterisis Rate DISABLED    Toni Setting FABIOLA Delay Low 150    Toni Setting PAV Delay Low 180    Toni Setting AT Mode Switch Rate 171    Lead Channel Setting Sensing Polarity Bipolar    Lead Channel Setting Sensing Anode Location Right Atrium    Lead Channel Setting Sensing Anode Terminal Ring    Lead Channel Setting Sensing Cathode Location Right Atrium    Lead Channel Setting Sensing Cathode Terminal Tip    Lead Channel Setting Sensing Sensitivity 0.15    Lead Channel Setting Sensing Polarity Bipolar    Lead Channel Setting Sensing Anode Location Right Ventricle    Lead Channel Setting Sensing Anode Terminal Ring    Lead Channel Setting Sensing Cathode Location Right Ventricle    Lead Channel Setting Sensing Cathode Terminal Tip    Lead Channel Setting Sensing Sensitivity  0.9    Lead Channel Setting Pacing Polarity Bipolar    Lead Channel Setting Pacing Anode Location Right Atrium    Lead Channel Setting Pacing Anode Terminal Ring    Lead Channel Setting Sensing Cathode Location Right Atrium    Lead Channel Setting Sensing Cathode Terminal Tip    Lead Channel Setting Pacing Pulse Width 0.4    Lead Channel Setting Pacing Amplitude 1.75    Lead Channel Setting Pacing Capture Mode Adaptive    Lead Channel Setting Pacing Polarity Bipolar    Lead Channel Setting Pacing Anode Location Right Ventricle    Lead Channel Setting Pacing Anode Terminal Ring    Lead Channel Setting Sensing Cathode Location Right Ventricle    Lead Channel Setting Sensing Cathode Terminal Tip    Lead Channel Setting Pacing Pulse Width 0.4    Lead Channel Setting Pacing Amplitude 3.75    Lead Channel Setting Pacing Capture Mode Adaptive    Zone Setting Type Category VF    Zone Setting Vendor Type Category V High Rate    Zone Setting Type Category VT    Zone Setting Vendor Type Category FastVT    Zone Setting Type Category VT    Zone Setting Vendor Type Category VT    Zone Setting Type Category VT    Zone Setting Vendor Type Category MonVT    Zone Setting Status Monitor    Zone Setting Detection Interval 350    Zone Setting Type Category ATRIAL_FIBRILLATION    Zone Setting Vendor Type Category FastATAF    Zone Setting Type Category AT/AF    Zone Setting Status Monitor    Zone Setting Detection Interval 350    Lead Channel Impedance Value 285    Lead Channel Impedance Value 228    Lead Channel Sensing Intrinsic Amplitude 0.5    Lead Channel Pacing Threshold Amplitude 1.0    Lead Channel Pacing Threshold Pulse Width 0.4    Lead Channel Impedance Value 285    Lead Channel Impedance Value 228    Lead Channel Pacing Threshold Amplitude 1.75    Lead Channel Pacing Threshold Pulse Width 0.4    Battery Date Time of Measurements 20,240,613,081,502    Battery Status Middle of Service    Battery RRT Trigger 2.625    Battery  Remaining Longevity 42    Battery Voltage 2.95    Toni Statistic Date Time Start 20,240,110,092,728    Toni Statistic Date Time End 20,240,613,084,651    Toni Statistic RA Percent Paced 35.97    Toni Statistic RV Percent Paced 84.03    Toni Statistic AP  Percent 40.26    Toni Statistic AS  Percent 45.35    Toni Statistic AP VS Percent 0.91    Toni Statistic AS VS Percent 13.44    Atrial Tachy Statistic Date Time Start 20,240,110,092,728    Atrial Tachy Statistic Date Time End 20,240,613,084,651    Atrial Tachy Statistic AT/AF [place] Percent 36.7    Therapy Statistic Recent Date Time Start 20,240,110,092,728    Therapy Statistic Recent Date Time End 20,240,613,084,651    Therapy Statistic Total  Date Time Start 20,180,925,174,508    Therapy Statistic Total  Date Time End 20,240,613,084,651    Episode Statistic Recent Count 5,199    Episode Statistic Type Category AT/AF    Episode Statistic Recent Count 0    Episode Statistic Type Category Patient Activated    Episode Statistic Recent Count 0    Episode Statistic Type Category SVT    Episode Statistic Recent Count 26    Episode Statistic Type Category VT    Episode Statistic Recent Count 1    Episode Statistic Type Category VT    Episode Statistic Recent Date Time Start 20,240,110,092,728    Episode Statistic Recent Date Time End 20,240,613,084,651    Episode Statistic Recent Date Time Start 20240110092728    Episode Statistic Recent Date Time End 09539759178234    Episode Statistic Recent Date Time Start 08278924003378    Episode Statistic Recent Date Time End 82978471672462    Episode Statistic Recent Date Time Start 20240110092728    Episode Statistic Recent Date Time End 28445567084956    Episode Statistic Recent Date Time Start 20240110092728    Episode Statistic Recent Date Time End 20240613084651    Episode Statistic Total Count 10,840    Episode Statistic Type Category AT/AF    Episode Statistic Total Count 0    Episode Statistic Type Category  Patient Activated    Episode Statistic Total Count 0    Episode Statistic Type Category SVT    Episode Statistic Total Count 43    Episode Statistic Type Category VT    Episode Statistic Total Count 1    Episode Statistic Type Category VT    Episode Statistic Total Date Time Start 20,180,925,174,508    Episode Statistic Total Date Time End 20,240,613,084,651    Episode Statistic Total Date Time Start 50706739554936    Episode Statistic Total Date Time End 21455934483025    Episode Statistic Total Date Time Start 30913880120505    Episode Statistic Total Date Time End 30225758496426    Episode Statistic Total Date Time Start 36000706226035    Episode Statistic Total Date Time End 92214422037476    Episode Statistic Total Date Time Start 59420880667627    Episode Statistic Total Date Time End 60856166737464    Episode Identifier 10,852    Episode Type Category VT1_MONITOR    Episode Date Time 20,240,612,155,150    Episode Duration 25    Episode Type Induced Flag NO    Episode Identifier 39141    Episode Type Category VT    Episode Date Time [id number]    Episode Duration 1    Episode Type Induced Flag NO    Episode Identifier 44576    Episode Type Category VT    Episode Date Time [id number]    Episode Duration 1    Episode Type Induced Flag NO    Episode Identifier 05753    Episode Type Category VT    Episode Date Time [id number]    Episode Duration 1    Episode Type Induced Flag NO    Episode Identifier 96243    Episode Type Category VT    Episode Date Time [id number]    Episode Duration 1    Episode Type Induced Flag NO    Episode Identifier 63113    Episode Type Category VT    Episode Date Time [id number]    Episode Duration 0    Episode Type Induced Flag NO    Episode Identifier 42677    Episode Type Category VT    Episode Date Time [id number]    Episode Duration 1    Episode Type Induced Flag NO    Episode Identifier 82018    Episode Type Category VT    Episode Date Time [id number]     Episode Duration 1    Episode Type Induced Flag NO    Episode Identifier 42398    Episode Type Category VT    Episode Date Time [id number]    Episode Duration 0    Episode Type Induced Flag NO    Episode Identifier 88113    Episode Type Category VT    Episode Date Time [id number]    Episode Duration 1    Episode Type Induced Flag NO    Episode Identifier 04771    Episode Type Category VT    Episode Date Time [id number]    Episode Duration 1    Episode Type Induced Flag NO    Episode Identifier 30096    Episode Type Category VT    Episode Date Time [id number]    Episode Duration 1    Episode Type Induced Flag NO    Episode Identifier 36275    Episode Type Category VT    Episode Date Time [id number]    Episode Duration 0    Episode Type Induced Flag NO    Episode Identifier 53616    Episode Type Category VT    Episode Date Time [id number]    Episode Duration 0    Episode Type Induced Flag NO    Episode Identifier 80859    Episode Type Category VT    Episode Date Time [id number]    Episode Duration 1    Episode Type Induced Flag NO    Episode Identifier 10514    Episode Type Category VT    Episode Date Time [id number]    Episode Duration 1    Episode Type Induced Flag NO    Episode Identifier 67291    Episode Type Category Monitor    Episode Date Time [id number]    Episode Duration 54    Episode Type Induced Flag NO    Episode Identifier 88618    Episode Type Category Monitor    Episode Date Time [id number]    Episode Duration 89    Episode Type Induced Flag NO    Episode Identifier 25244    Episode Type Category Monitor    Episode Date Time [id number]    Episode Duration 119    Episode Type Induced Flag NO    Episode Identifier 22944    Episode Type Category Monitor    Episode Date Time [id number]    Episode Duration 34    Episode Type Induced Flag NO    Episode Identifier 30907    Episode Type Category Monitor    Episode Date Time [id number]    Episode Duration 45     Episode Type Induced Flag NO    Episode Identifier 10067    Episode Type Category Monitor    Episode Date Time [id number]    Episode Duration 30    Episode Type Induced Flag NO    Episode Identifier 12368    Episode Type Category Monitor    Episode Date Time [id number]    Episode Duration 477    Episode Type Induced Flag NO    Episode Identifier 94143    Episode Type Category Monitor    Episode Date Time 09323224453436    Episode Duration 88    Episode Type Induced Flag NO    Episode Identifier 66085    Episode Type Category Monitor    Episode Date Time [id number]    Episode Duration 13    Episode Type Induced Flag NO    Episode Identifier 40309    Episode Type Category Monitor    Episode Date Time [id number]    Episode Duration 92    Episode Type Induced Flag NO    Episode Identifier 76642    Episode Type Category Monitor    Episode Date Time [id number]    Episode Duration 137    Episode Type Induced Flag NO    Episode Identifier 88149    Episode Type Category Monitor    Episode Date Time [id number]    Episode Duration 37    Episode Type Induced Flag NO    Episode Identifier 81720    Episode Type Category Monitor    Episode Date Time [id number]    Episode Duration 169    Episode Type Induced Flag NO    Episode Identifier 90781    Episode Type Category Monitor    Episode Date Time 00577052904896    Episode Duration 31    Episode Type Induced Flag NO    Episode Identifier 82241    Episode Type Category Monitor    Episode Date Time [id number]    Episode Duration 223    Episode Type Induced Flag NO    Episode Identifier 26389    Episode Type Category Monitor    Episode Date Time [id number]    Episode Duration 3    Episode Type Induced Flag NO    Episode Identifier 23162    Episode Type Category Monitor    Episode Date Time [id number]    Episode Duration 286    Episode Type Induced Flag NO    Episode Identifier 10278    Episode Type Category Monitor    Episode Date Time  [id number]    Episode Duration 35    Episode Type Induced Flag NO    Episode Identifier 59274    Episode Type Category Monitor    Episode Date Time [id number]    Episode Duration 18    Episode Type Induced Flag NO    Episode Identifier 84170    Episode Type Category Monitor    Episode Date Time [id number]    Episode Duration 178    Episode Type Induced Flag NO    Episode Identifier 12326    Episode Type Category Monitor    Episode Date Time [id number]    Episode Duration 270    Episode Type Induced Flag NO    Episode Identifier 47093    Episode Type Category Monitor    Episode Date Time 65218061440589    Episode Duration 125    Episode Type Induced Flag NO    Episode Identifier 49118    Episode Type Category Monitor    Episode Date Time [id number]    Episode Duration 98    Episode Type Induced Flag NO    Episode Identifier 56205    Episode Type Category Monitor    Episode Date Time 75165620382785    Episode Duration 142    Episode Type Induced Flag NO    Episode Identifier 32384    Episode Type Category Monitor    Episode Date Time [id number]    Episode Duration 128    Episode Type Induced Flag NO    Episode Identifier 06669    Episode Type Category Monitor    Episode Date Time [id number]    Episode Duration 58    Episode Type Induced Flag NO    Episode Identifier 82545    Episode Type Category Monitor    Episode Date Time 52524151821623    Episode Duration 27    Episode Type Induced Flag NO    Episode Identifier 31717    Episode Type Category Monitor    Episode Date Time [id number]    Episode Duration 203    Episode Type Induced Flag NO    Episode Identifier 25457    Episode Type Category Monitor    Episode Date Time [id number]    Episode Duration 347    Episode Type Induced Flag NO    Episode Identifier 18559    Episode Type Category Monitor    Episode Date Time [id number]    Episode Duration 18    Episode Type Induced Flag NO    Episode Identifier 45059    Episode Type  Category Monitor    Episode Date Time [id number]    Episode Duration 640    Episode Type Induced Flag NO    Episode Identifier 27692    Episode Type Category Monitor    Episode Date Time [id number]    Episode Duration 24    Episode Type Induced Flag NO    Episode Identifier 82302    Episode Type Category Monitor    Episode Date Time [id number]    Episode Duration 201    Episode Type Induced Flag NO    Episode Identifier 66135    Episode Type Category Monitor    Episode Date Time [id number]    Episode Duration 384    Episode Type Induced Flag NO    Episode Identifier 28996    Episode Type Category Monitor    Episode Date Time [id number]    Episode Duration 105    Episode Type Induced Flag NO    Episode Identifier 34524    Episode Type Category Monitor    Episode Date Time [id number]    Episode Duration 84    Episode Type Induced Flag NO    Episode Identifier 80543    Episode Type Category Monitor    Episode Date Time [id number]    Episode Duration 481    Episode Type Induced Flag NO    Episode Identifier 81685    Episode Type Category Monitor    Episode Date Time [id number]    Episode Duration 59    Episode Type Induced Flag NO    Episode Identifier 31468    Episode Type Category Monitor    Episode Date Time [id number]    Episode Duration 1,012    Episode Type Induced Flag NO    Episode Identifier 11841    Episode Type Category Monitor    Episode Date Time [id number]    Episode Duration 27    Episode Type Induced Flag NO    Episode Identifier 48471    Episode Type Category Monitor    Episode Date Time [id number]    Episode Duration 199    Episode Type Induced Flag NO    Episode Identifier 69312    Episode Type Category Monitor    Episode Date Time [id number]    Episode Duration 162    Episode Type Induced Flag NO    Episode Identifier 26094    Episode Type Category Monitor    Episode Date Time [id number]    Episode Duration 319    Episode Type Induced Flag NO     Episode Identifier 66693    Episode Type Category Monitor    Episode Date Time [id number]    Episode Duration 47    Episode Type Induced Flag NO    Episode Identifier 06423    Episode Type Category Monitor    Episode Date Time [id number]    Episode Duration 57    Episode Type Induced Flag NO    Episode Identifier 42142    Episode Type Category Monitor    Episode Date Time [id number]    Episode Duration 49    Episode Type Induced Flag NO    Episode Identifier 70489    Episode Type Category Monitor    Episode Date Time [id number]    Episode Duration 153    Episode Type Induced Flag NO    Episode Identifier 42318    Episode Type Category Monitor    Episode Date Time [id number]    Episode Duration 74    Episode Type Induced Flag NO    Episode Identifier 74799    Episode Type Category Monitor    Episode Date Time [id number]    Episode Duration 197    Episode Type Induced Flag NO    Episode Identifier 87152    Episode Type Category Monitor    Episode Date Time [id number]    Episode Duration 146    Episode Type Induced Flag NO    Narrative    Patient seen in [address] for evaluation and iterative programming of their   pacemaker per MD orders.    Device: Medtronic [id number] [place] XT DR MRI  Normal device function  Mode: DDDR  bpm  AP: 36%  : 84%  Intrinsic Rhythm: CHB; AS 75 bpm/ 30 bpm with frequent PVCs   Short V-V intervals: 14   Lead Trends Appear Stable   Estimated battery longevity to RRT = 3.5 years/Battery voltage = 2.95 V.   Atrial Arrhythmia: Ongoing AF from 4/2024 to 6/12/24.   AF [place] = 36.7%  Anticoagulant: heparin  Ventricular Arrhythmia: 27 VT episodes recorded. Available data reveals VT   episode first occurring at 1551 on 6/12/24. EGM shows AP/ markers at 122   bpm  followed by monomorphic VT lasting >25 sec- rhythm then degrades into   a disorganized ventricular arrhythmia and continues.      DEANDRE Crowley RN    Dual lead pacemaker    I have reviewed and interpreted the  device interrogation, settings,   programming and nurse's summary. The device is functioning within normal   device parameters. I agree with the current findings, assessment and plan.     US Lower Extremity Arterial Duplex Bilateral    Narrative    ULTRASOUND LOWER EXTREMITY ARTERIAL DUPLEX BILATERAL  6/13/2024 6:49  PM    CLINICAL HISTORY: Loss of pulses even with pressors down and  normothermic.     COMPARISONS: Ultrasound lower extremity arterial duplex bilateral  6/12/2024    REFERRING PROVIDER: JULIANNE BROOKS    TECHNIQUE: Bilateral leg arteries evaluated with color Doppler and  Doppler waveform ultrasound    FINDINGS:     RIGHT:       Common femoral artery: OBSCURED       Profundus femoral artery: OBSCURED         Superficial femoral artery, origin: OBSCURED       Superficial femoral artery, mid thigh: 12/5 cm/s, tardus parvus        Superficial femoral artery, distal thigh: 9/2 cm/s, tardus parvus         Popliteal artery: 13/6 cm/s, tardus parvus         Posterior tibial artery, ankle: 11/4 cm/s, tardus parvus       Anterior tibial artery, ankle: 24/10 cm/s, tardus parvus    LEFT:       Common femoral artery: OBSCURED       Profundus femoral artery: OBSCURED         Superficial femoral artery, origin: OBSCURED       Superficial femoral artery, mid thigh: 20/5 cm/s, tardus parvus       Superficial femoral artery, distal thigh: 17/5 cm/s, tardus  parvus         Popliteal artery: 12/5 cm/s, tardus parvus         Posterior tibial artery, ankle: 9/2 cm/s, tardus parvus       Anterior tibial artery, ankle: 12/5 cm/s, tardus parvus      Impression    IMPRESSION:  1. Flow restored to the right anterior tibial artery.    2. Waveforms suggest more proximal stenosis or occlusion. Slow  Dopplerable flow in bilateral posterior and anterior tibial arteries  at the ankles.    I have personally reviewed the examination and initial interpretation  and I agree with the findings.    INGE SOARES MD         SYSTEM ID:  [id number]    XR Chest Port 1 View    Impression    RESIDENT PRELIMINARY INTERPRETATION  IMPRESSION:  Stable support devices. Stable opacities likely representing pulmonary  edema and atelectasis.

## 2024-06-14 NOTE — PROGRESS NOTES
Nephrology Progress Note  06/14/2024       Tyler Renteria is 64 yom w/ESRD on HD MWF, CABG 2001, redo CABG 2011, multiple PCIs of unknown targets, afib, DM2, EF 30-35% with PPM for CHB. Recently admitted to [facility] 5/21-5/31/24 for CHF exacerbation, hypotension, anemia and GIB. EGD 5/22/24 w/4 non-bleeding angioectasias and single AVM in duodenum treated with APC and clip. Colonoscopy on 5/24/24 w/multiple polyps and a large cecal polyp not amenable to removal. 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 Now s/p arrest on 6/12, on ECMO with IABP, Nephrology consulted for management of dialysis post arrest with lactic acidosis.     Interval History :   Mr Renteria continues on ECMO, unfortunately has very concerning neuro picture so is likely to go comfort care but family is still processing.  Has no urgent indication for now and would anticipate not starting CRRT but will continue to follow until final decisions are made.     Assessment & Recommendations:   ESRD-Unknown details other than MWF until records are available.  Currently on ECMO, had planned to start CRRT but neuro picture is very poor and there is no urgent indication.  Will continue to follow.                 -ESRD, will need RRT but no urgent indication.  Bicarb low but pH 7.35.                  -No need for new RRT consent, continuing long term RRT for ESRD.                 -Access will be ECMO circuit.      Volume status-+1-2 edema in BLE but stable pulm status.  If further volume expanded we can use isotonic bicarb as IVF.        Electrolytes/pH-K 4.9, bicarb 20 with pH of 7.35 with lactic acidosis.      Ca/phos/pth-No acute issues.      Anemia-Hgb 8.5, acute management per team.      Nutrition-Deferred     Time spent: 45 minutes on this date of encounter for chart review, physical exam, medical decision making and co-ordination of care.      Seen and discussed with Dr Soares    "  Recommendations were communicated to primary team via verbal communication.           Kelvin Duque, CHRIS CNS  Clinical Nurse Specialist  [phone]    Review of Systems:   I reviewed the following systems:  ROS not done due to vent/sedation    Physical Exam:   I/O last 3 completed shifts:  In: 1812.41 [I.V.:882.41; NG/GT:30]  Out: 450 [Emesis/NG output:100; Stool:350]   /45   Pulse 87   Temp 98.8  F (37.1  C) (Esophageal)   Resp 17   Ht 1.8 m (5' 10.87\")   Wt 109.8 kg (242 lb 1 oz)   SpO2 99%   BMI 33.89 kg/m       General: Intubated and sedated.   HEENT: PERRL, no scleral icterus or injection  CARDIAC: RRR, no m/r/g appreciated. Peripheral pulses dopplered  RESP: Mechanical ventilation; CTAB, no wheezes, rhonchi or crackles appreciated.  GI: soft, BS hypoactive  : [place]  EXTREMITIES: NO LE edema, pulses 2+. Femoral access site w/o bleeding, dressing c/d/i.   SKIN: No acute lesions appreciated  NEURO: Intubated and sedated    Labs:   All labs reviewed by me  Electrolytes/Renal -   Recent Labs   Lab Test 06/14/24  1157 06/14/24  0957 06/14/24  0953 06/14/24  0353 06/14/24  0346 06/13/24  2215 06/13/24  2210 06/13/24  0413 06/13/24  0406   NA  --   --  140  --  141  --  140   < > 143   POTASSIUM  --   --  4.9  --  4.9  --  4.7   < > 3.8   CHLORIDE  --   --  107  --  108*  --  107   < > 109*   CO2  --   --  20*  --  20*  --  17*   < > 14*   BUN  --   --  27.7*  --  24.3*  --  21.4   < > 13.3   CR  --   --  4.77*  --  4.54*  --  4.23*   < > 3.39*   * 147* 157*  171*   < > 150*  157*   < > 154*  166*   < > 116*  124*   NAVI  --   --  8.6*  --  8.6*  --  8.7*   < > 8.3   MAG  --   --  2.1  --  2.1  --  2.1   < > 2.1   PHOS  --   --   --   --  7.2*  --   --   --  4.9*    < > = values in this interval not displayed.       CBC -   Recent Labs   Lab Test 06/14/24  0953 06/14/24  0346 06/13/24  2210   WBC 20.7* 23.6* 23.0*   HGB 8.5* 7.9* 7.6*   PLT 81* 97* 105*       LFTs -   Recent Labs   Lab " Test 06/14/24  0953 06/14/24  0346 06/13/24  2210   ALKPHOS 62 63 65   BILITOTAL 1.0 0.7 0.8   ALT 51 46 48   * 389* 437*   PROTTOTAL 3.8* 3.9* 3.8*   ALBUMIN 1.9* 2.0* 1.9*       Iron Panel - No lab results found.        Current Medications:  Current Facility-Administered Medications   Medication Dose Route Frequency Provider Last Rate Last Admin    aspirin (ASA) chewable tablet 81 mg  81 mg Oral or Feeding Tube Daily Shaye Castillo APRN CNP   81 mg at 06/14/24 1105    cefTRIAXone (ROCEPHIN) 2 g vial to attach to  ml bag for ADULTS or NS 50 ml bag for PEDS  2 g Intravenous Q24H Aries Albert MD   2 g at 06/13/24 2007    chlorhexidine (PERIDEX) 0.12 % solution 15 mL  15 mL Mouth/Throat Q12H Aries Albert MD   15 mL at 06/14/24 0722    pantoprazole (PROTONIX) IV push injection 40 mg  40 mg Intravenous BID Hermila Saba APRN CNP   40 mg at 06/14/24 0722    potassium chloride 20 mEq in 50 mL intermittent infusion  20 mEq Intravenous Once Invasive, CardiologMD julia         Current Facility-Administered Medications   Medication Dose Route Frequency Provider Last Rate Last Admin    dextrose 10% 1,000 mL with sodium chloride 0.9 % infusion  0-100 mL/hr Intravenous Continuous Hermila Saba APRN CNP   Stopped at 06/14/24 0800    EPINEPHrine (ADRENALIN) 16 mg in sodium chloride 0.9 % 250 mL infusion CENTRAL  0.01-0.3 mcg/kg/min (Dosing Weight) Intravenous Continuous InvasiveKaren MD 3 mL/hr at 06/14/24 1200 0.03 mcg/kg/min at 06/14/24 1200    [Held by provider] heparin (porcine) 100 unit/mL in 0.45% Sodium Chloride ANTICOAGULANT infusion  10-50 Units/kg/hr (Ideal) Other Continuous InvasiveKaren MD   Held at 06/12/24 2028    heparin 2 unit/mL in 0.9% NaCl (for REPERFUSION CATHETER)  3 mL/hr Intravenous Continuous Aries Albert MD 3 mL/hr at 06/14/24 1200 3 mL/hr at 06/14/24 1200    heparin infusion 25,000 units in 0.45% NaCl 250 mL ANTICOAGULANT  500 Units/hr Intravenous  Continuous Hermila Saba APRN CNP 5 mL/hr at 06/14/24 1200 500 Units/hr at 06/14/24 1200    insulin regular (MYXREDLIN) 1 unit/mL infusion  0-24 Units/hr Intravenous Continuous Aries Albert MD   Held at 06/12/24 2010    norepinephrine (LEVOPHED) 16 mg in  mL infusion MAX CONC CENTRAL LINE  0.01-0.6 mcg/kg/min (Dosing Weight) Intravenous Continuous Invasive, CardiologMD julia 4 mL/hr at 06/14/24 1200 0.04 mcg/kg/min at 06/14/24 1200    vasopressin 1 unit/mL MAX Conc (PITRESSIN) infusion  0.5-4 Units/hr Intravenous Continuous Invasive CardiologMD julia 3 mL/hr at 06/14/24 1200 3 Units/hr at 06/14/24 1200

## 2024-06-14 NOTE — PROGRESS NOTES
Shift Summary [id number]  Major Shift Events  Head CT done d/t patient riding the vent a no longer overbreathing. D/t no evidence of herniation, plan to monitor patient in coming days. No new skin issues present during shift. 1 PRBC and 1 Platelet given.     For vital signs and complete assessments, please see documentation flowsheets.

## 2024-06-14 NOTE — PHARMACY-VANCOMYCIN DOSING SERVICE
Pharmacy Vancomycin Note  Date of Service 2024  Patient's  1960   64 year old, male    Indication: Aspiration Pneumonia  Day of Therapy: 3  Current vancomycin regimen:  Intermittent dosing  Current vancomycin monitoring method: Trough (Method 2 = manual dose calculation)  Current vancomycin therapeutic monitoring goal: 15-20 mg/L    Renal:  HD    Recent Vancomycin Levels (past 3 days)  2024:  3:46 AM Vancomycin 15.3 ug/mL    Vancomycin IV Administrations (past 72 hours)                     vancomycin (VANCOCIN) 2,250 mg in sodium chloride 0.9 % 250 mL intermittent infusion (mg) 2,250 mg New Bag 248                    Nephrotoxins and other renal medications (From now, onward)      Start     Dose/Rate Route Frequency Ordered Stop    24 1600  vancomycin (VANCOCIN) 1,500 mg in 0.9% NaCl 250 mL intermittent infusion         1,500 mg  over 90 Minutes Intravenous ONCE 24 0840      24 2030  norepinephrine (LEVOPHED) 16 mg in  mL infusion MAX CONC CENTRAL LINE         0.01-0.6 mcg/kg/min × 107.4 kg (Dosing Weight)  1-60.4 mL/hr  Intravenous CONTINUOUS 24  vasopressin 1 unit/mL MAX Conc (PITRESSIN) infusion         0.5-4 Units/hr  0.5-4 mL/hr  Intravenous CONTINUOUS 24 2017  vancomycin place cardenas - receiving intermittent dosing         1 each Intravenous SEE ADMIN INSTRUCTIONS 24 2018                 Contrast Orders - past 72 hours (72h ago, onward)      Start     Dose/Rate Route Frequency Stop    24 1814  iopamidol (ISOVUE-370) solution  Status:  Discontinued           ONCE PRN 24 1905            Interpretation of levels and current regimen:  Vancomycin level would be ~ 11 mg/L after an HD run of 3.5 hr so re-dosing appropriate post-HD    Renal Function: ESRD on Dialysis    Plan:  Give vancomycin 1500 mg IV x 1 following HD   Vancomycin monitoring method: Trough (Method 2 = manual dose  calculation)  Vancomycin therapeutic monitoring goal: 15-20 mg/L  Pharmacy will check vancomycin levels as appropriate in 1-3 Days.      Rhonda Guzman, KimmyD, BCPS

## 2024-06-14 NOTE — PHARMACY-ADMISSION MEDICATION HISTORY
Pharmacist Admission Medication History    Admission medication history is complete. The information provided in this note is only as accurate as the sources available at the time of the update.    [email or web address] and review of patient's other chart (MR [id number])  No patient interview conducted at this time due to patient clinical status    Pertinent Information: Medication list from 5/31/24 discharge summary at [facility]    Changes made to PTA medication list:  Added: apixaban, atorva, calcitriol, gabapentin, EMLA, midodrine, NTG, vit D  Deleted: None  Changed: None    Allergies reviewed with patient and updates made in EHR: yes (NKDA from other chart)    Medication History Completed By: Rhonda Guzman [facility] 6/14/2024 8:57 AM    PTA Med List   Medication Sig Last Dose    apixaban ANTICOAGULANT (ELIQUIS) 2.5 MG tablet Take 2.5 mg by mouth 2 times daily     atorvastatin (LIPITOR) 40 MG tablet Take 40 mg by mouth daily     calcitRIOL (ROCALTROL) 0.25 MCG capsule Take 0.75 mcg by mouth three times a week     gabapentin (NEURONTIN) 300 MG capsule Take 300 mg by mouth 3 times daily     lidocaine-prilocaine (EMLA) 2.5-2.5 % external cream Apply topically as needed for moderate pain Apply over dialysis access 1/2 hour prior to each dialysis treatment     midodrine (PROAMATINE) 10 MG tablet Take 10 mg by mouth daily     nitroGLYcerin (NITROSTAT) 0.4 MG sublingual tablet Place 0.4 mg under the tongue every 5 minutes as needed for chest pain For chest pain place 1 tablet under the tongue every 5 minutes for 3 doses. If symptoms persist 5 minutes after 1st dose call 911.     Vitamin D3 (CHOLECALCIFEROL) 25 mcg (1000 units) tablet Take 2 tablets by mouth daily

## 2024-06-14 NOTE — CONSULTS
PALLIATIVE CARE SOCIAL WORK Progress Note   Location: [facility]      Attempted initial PCSW visit this afternoon as part of ECMO triggered consult.    No family in room at time of visit, pt down for CT.     Plan: PCSW will continue to follow while palliative care team is involved; will complete initial visit Mon 6/17.    Clinical Social Work Interventions:   Other: attempted initial visit today, unable to complete      JORGE A Quevedo, [facility] HospitalSW  [facility], Palliative Care  Securely message with the [employer] Web Console (learn more here) or  Text page via [facility] Paging/Directory

## 2024-06-15 NOTE — PROGRESS NOTES
ECLS Shift Summary:     ECMO Equipment:  Console Serial Number: [id number]  Circuit Lot Number: [id number]  Oxygenator Lot Number: [id number]    Circuit Assessment: Free of fibrin, clot, and air    Arterial ECMO Cannula: 17 Fr in the Right Femoral Artery  Venous ECMO Cannula: 25 Fr in the Right Femoral Vein  Distal Perfusion Catheter: 8 Fr in the Right Superficial Femoral Artery       Patient remains on V-A ECMO, all equipment is functioning and alarms are appropriately set. RPM's: 3700 with Blood Flow (Circuit) LPM  Av.6 LPM  Min: 4.55 LPM  Max: 4.68 LPM L/min. Sweep is at 1 LPM and 50 %. Extremities are mottled and edematous.     Significant Shift Events: CRRT started today. Titrate Heparin to 140-160 ACT goal (off straight rate).    Vent settings:  Vent Mode: CMV/AC  (Continuous Mandatory Ventilation/ Assist Control)  FiO2 (%): 40 %  Resp Rate (Set): 10 breaths/min  Tidal Volume (Set, mL): 450 mL  PEEP (cm H2O): 7 cmH2O  Resp: 10      Anticoagulation:  Dose (units/hr) HEParin: 500 Units/hr  Rate (mL/hr) HEParin: 5 mL/hr  Concentration HEParin: 100 Units/mL        Most recent: ACT  (seconds): 148 seconds    Urine output is aneuric.  Pt on CRRT removing net 50ml/hr.  Blood loss was GI bleeding and a small amt from nose. Product given included:  x1ea Platelet and PRBC.     Intake/Output Summary (Last 24 hours) at 6/15/2024 1827  Last data filed at 6/15/2024 1800  Gross per 24 hour   Intake 2252.36 ml   Output 912 ml   Net 1340.36 ml       Labs:  Recent Labs   Lab 06/15/24  1810 06/15/24  1546 06/15/24  1411 06/15/24  1207   PH 7.40 7.38 7.35 7.33*   PCO2 38 40 42 44   PO2 140* 119* 130* 119*   HCO3 24 24 23 24   O2PER 40 50  40  40 40 50       Lab Results   Component Value Date    HGB 8.1 (L) 06/15/2024    PHGB 30 (H) 06/15/2024     (L) 06/15/2024    FIBR 263 06/15/2024    INR 1.50 (H) 06/15/2024    PTT 41 (H) 06/15/2024    DD 2.79 (H) 06/15/2024     ARNOLDO 37 (L) 06/15/2024       Plan is repeat head CT tomorrow.    Feroz Estevez, RT  ECMO Specialist  6/15/2024 6:27 PM
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
## 2024-06-15 NOTE — PROGRESS NOTES
Nephrology Brief Note.  Dianna 15, 2024  Patient's daughter, Chica Renteria was called.  She confirmed Mr Renteria has been receiving dialysis for the last 5-6 years at [facility] in [place].  I reviewed with her the need for CRRT through the ECMO circuit.  Ms Renteria comfirmed consent to proceed with dialysis while Mr Renteria is hospitalized.  Verbal phone consent was obtained in the presence of Ms Javier Rivera.  Morteza Rowe MD  Division of Nephrology and Hypertension  Pager: [phone]  Dianna 15, 2024  2:05 PM

## 2024-06-15 NOTE — PROGRESS NOTES
[facility]    ECLS Shift Summary:     ECMO Equipment:  Console Serial Number: [id number]  Circuit Lot Number: [id number]  Oxygenator Lot Number: [id number]    Circuit Assessment: Free of fibrin, clot, and air    Arterial ECMO Cannula: 17 Fr in the Right Femoral Artery  Venous ECMO Cannula: 25 Fr in the Right Femoral Vein  Distal Perfusion Catheter: 8 Fr in the Right Superficial Femoral Artery    ECMO Cannula Arterial Right femoral artery-Site Assessment: WDL, Sutured, Secure  ECMO Cannula Venous Right femoral vein-Site Assessment: WDL, Sutured, Secure  Distal Perfusion Catheter Right superficial femoral artery-Site Assessment: WDL  ECMO Cannula Arterial Right femoral artery-Site Intervention: No intervention needed  ECMO Cannula Venous Right femoral vein-Site Intervention: No intervention needed  Distal Perfusion Catheter Right superficial femoral artery-Site Intervention: No intervention needed    Patient remains on V-A ECMO, all equipment is functioning and alarms are appropriately set. RPM's: 3700 with Blood Flow (Circuit) LPM  Av.7 LPM  Min: 4.58 LPM  Max: 4.79 LPM L/min. Sweep is at 1 LPM and 40 %. Extremities are swollen and weeping, warm to touch.     Significant Shift Events: throughout shift pt had multiple episodes of bleeding around fecal tube resulting in turns and cleaning.     Vent settings:  Vent Mode: CMV/AC  (Continuous Mandatory Ventilation/ Assist Control)  FiO2 (%): 40 %  Resp Rate (Set): 10 breaths/min  Tidal Volume (Set, mL): 450 mL  PEEP (cm H2O): 7 cmH2O  Resp: 10      Anticoagulation:  Dose (units/hr) HEParin: 500 Units/hr  Rate (mL/hr) HEParin: 5 mL/hr  Concentration HEParin: 100 Units/mL        Most recent: ACT  (seconds): 148 seconds    Urine output is 0 mL/hr.  .  Blood loss was minimal. Product given included PRBC x2 for low Hgb.     Intake/Output Summary (Last 24 hours) at 6/15/2024 0623  Last data filed at 6/15/2024 0600  Gross per 24  hour   Intake 2206.25 ml   Output 500 ml   Net 1706.25 ml       Labs:  Recent Labs   Lab 06/15/24  0546 06/15/24  0504 06/15/24  0354 06/15/24  0152 06/14/24  2336   PH 7.37  --  7.37 7.39 7.38   PCO2 41  --  40 39 39   PO2 70*  --  92 106* 73*   HCO3 23  --  23 23 23   O2PER 40 40 40  40  40 40 30       Lab Results   Component Value Date    HGB 8.5 (L) 06/15/2024    PHGB 30 (H) 06/15/2024     (L) 06/15/2024    FIBR 251 06/15/2024    INR 1.54 (H) 06/15/2024    PTT 43 (H) 06/15/2024    DD 2.99 (H) 06/15/2024    ANTCH 38 (L) 06/14/2024       Plan is family consult for end of life measures. Continued [facility]-ECMO cares.    Cal Jung RN  ECMO Specialist  6/15/2024 6:23 AM

## 2024-06-15 NOTE — PROGRESS NOTES
"CRRT INITIATION NOTE    Consent for CRRT Completed:  YES  Patient s Vascular Access: Catheter         ECMO     Placement Confirmed: YES  Manufacture:  NA  Model:  NA  Length/Turkish Size:  na  Flush Volume:  na    DATA:  Procedure:  CVVHDF  Start Time:  1432  Machine#:  [id number]  Filter:    Blood Flow:  200  ML/min  Pre-Replacement Solution:  PrismaSol BGK 2/3.5  Post-Replacement Solution:  PrismaSol BGK 2/3.5  Dialysate Solution:  PrismaSol BGK 2/3.5  Pre-Replacement Solution Rate:  1400 mL/hr  Post-Replacement Solution Rate:  200 mL/hr  Dialysate Flow Rate:  1400 mL/hr   Patient Removal Rate:  0 mL/hr  Anticoagulation Type and Rate:  na    ASSESSMENT:  How Patient Tolerated Initiation:   Vital Signs:   /45   Pulse 61   Temp 98.8  F (37.1  C)   Resp 10   Ht 1.8 m (5' 10.87\")   Wt 110.2 kg (242 lb 15.2 oz)   SpO2 99%   BMI 34.01 kg/m        For vital signs and complete assessments, please see documentation flowsheets.     Initial Pressures:  Access:  156  Filter:  158  Return:  101  TMP:  32  Change in Filter Pressure:  33      INTERVENTIONS:  Goals of therapy discussed with bedside RN.    PLAN:  Check circuit q 24 hrs and change circuit q 72 hrs and prn. Call CRRT resource RN with any questions or concerns          "

## 2024-06-15 NOTE — PROGRESS NOTES
"Nephrology Progress Note  06/15/2024       Tyler Renteria is 64 yom w/ESRD on HD MWF, CABG 2001, redo CABG 2011, multiple PCIs of unknown targets, afib, DM2, EF 30-35% with PPM for CHB. Recently admitted to [facility] 5/21-5/31/24 for CHF exacerbation, hypotension, anemia and GIB. EGD 5/22/24 w/4 non-bleeding angioectasias and single AVM in duodenum treated with APC and clip. Colonoscopy on 5/24/24 w/multiple polyps and a large cecal polyp not amenable to removal. 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 Now s/p arrest on 6/12, on ECMO with IABP, Nephrology consulted for management of dialysis post arrest with lactic acidosis.     Interval History : Patient remains critically ill on 3 pressors. He has fixed pupils off sedation for quite some time. EEG shows severely abnormal pattern with severe encephalopathy. He remains significantly hypervolemic on exam.     Assessment & Recommendations:   ESRD-Unknown details other than MWF until records are available.  Currently on ECMO, had planned to start CRRT but neuro picture is very poor and there was no urgent indication.      --was called by the ICU team that the plan for now is to continue aggressive cares. Will initiate CRRT mainly for volume optimization, however attaining UF will be extremely difficult, hoping to atleast maintain I=0. (Ordered for 0-50 ml/hr net negative)                -Access will be ECMO circuit.      # s/p Cardiac arrest - on ECMO   # Concern for anoxic brain injury - patient unresponsive with fixed pupils off sedation.    Cady Jason MD      Review of Systems:   I reviewed the following systems:  ROS not done due to vent/sedation    Physical Exam:   I/O last 3 completed shifts:  In: 1920.25 [I.V.:845.25; NG/GT:30]  Out: 500 [Stool:500]   /45   Pulse 68   Temp 98.6  F (37  C)   Resp 10   Ht 1.8 m (5' 10.87\")   Wt 110.2 kg (242 lb 15.2 oz)   SpO2 100%   BMI " 34.01 kg/m       General: Intubated and sedated.   HEENT: PERRL, no scleral icterus or injection  CARDIAC: RRR, no m/r/g appreciated. Peripheral pulses dopplered  RESP: Mechanical ventilation; CTAB, no wheezes, rhonchi or crackles appreciated.  GI: soft, BS hypoactive  : Montesinos  EXTREMITIES:  2-3+ LE edema. pulses 2+. Femoral access site w/o bleeding, dressing c/d/i.   SKIN: No acute lesions appreciated  NEURO: Intubated and sedated    Labs:   All labs reviewed by me  Electrolytes/Renal -   Recent Labs   Lab Test 06/15/24  1208 06/15/24  0943 06/15/24  0359 06/15/24  0354 06/14/24 2146 06/14/24 2142 06/14/24 0353 06/14/24 0346   NA  --  141  --  141  --  141   < > 141   POTASSIUM  --  4.9  --  4.9  --  5.0   < > 4.9   CHLORIDE  --  108*  --  109*  --  108*   < > 108*   CO2  --  21*  --  21*  --  20*   < > 20*   BUN  --  39.2*  --  35.3*  --  32.6*   < > 24.3*   CR  --  5.46*  --  5.26*  --  5.05*   < > 4.54*   * 140*  147*   < > 145*  147*   < > 144*  150*   < > 150*  157*   NAVI  --  8.6*  --  8.5*  --  8.5*   < > 8.6*   MAG  --  2.2  --  2.3  --  2.2   < > 2.1   PHOS  --   --   --  6.3*  --  6.3*  --  7.2*    < > = values in this interval not displayed.       CBC -   Recent Labs   Lab Test 06/15/24  0943 06/15/24  0354 06/14/24  2142   WBC 18.9* 20.1* 20.4*   HGB 7.9* 8.5* 8.4*   PLT 96* 111* 87*       LFTs -   Recent Labs   Lab Test 06/15/24  0943 06/15/24  0354 06/14/24  2142   ALKPHOS 95 64 62   BILITOTAL 1.2 1.1 1.1   ALT 34 35 40   * 229* 260*   PROTTOTAL 4.2* 4.2* 4.0*   ALBUMIN 2.1* 2.1* 2.0*       Iron Panel - No lab results found.        Current Medications:  Current Facility-Administered Medications   Medication Dose Route Frequency Provider Last Rate Last Admin    aspirin (ASA) chewable tablet 81 mg  81 mg Oral or Feeding Tube Daily Shaye Castillo, APRN CNP   81 mg at 06/15/24 0746    cefTRIAXone (ROCEPHIN) 2 g vial to attach to  ml bag for ADULTS or NS 50 ml bag for PEDS   2 g Intravenous Q24H Aries Albert MD   2 g at 06/14/24 1944    chlorhexidine (PERIDEX) 0.12 % solution 15 mL  15 mL Mouth/Throat Q12H Aries Albert MD   15 mL at 06/15/24 0746    pantoprazole (PROTONIX) IV push injection 40 mg  40 mg Intravenous BID Hermila Saba APRN CNP   40 mg at 06/15/24 0746    potassium chloride 20 mEq in 50 mL intermittent infusion  20 mEq Intravenous Once Invasive, Cardiologist, MD         Current Facility-Administered Medications   Medication Dose Route Frequency Provider Last Rate Last Admin    dextrose 10% 1,000 mL with sodium chloride 0.9 % infusion  0-100 mL/hr Intravenous Continuous Hermila Saba APRN CNP   Stopped at 06/14/24 0800    dialysate for CVVHD & CVVHDF (PrismaSol BGK 2/3.5)  12.5 mL/kg/hr CRRT Continuous Cady Jason MD        EPINEPHrine (ADRENALIN) 16 mg in sodium chloride 0.9 % 250 mL infusion CENTRAL  0.01-0.3 mcg/kg/min (Dosing Weight) Intravenous Continuous Invasive CardioMD polo 6 mL/hr at 06/15/24 1244 0.06 mcg/kg/min at 06/15/24 1244    heparin (porcine) 100 unit/mL in 0.45% Sodium Chloride ANTICOAGULANT infusion  10-50 Units/kg/hr (Ideal) Other Continuous Shaye Castillo APRN CNP 5 mL/hr at 06/15/24 1239 500 Units/hr at 06/15/24 1239    heparin 2 unit/mL in 0.9% NaCl (for REPERFUSION CATHETER)  3 mL/hr Intravenous Continuous Aries Albert MD 3 mL/hr at 06/15/24 1100 3 mL/hr at 06/15/24 1100    insulin regular (MYXREDLIN) 1 unit/mL infusion  0-24 Units/hr Intravenous Continuous Aries Albert MD   Held at 06/12/24 2010    No heparin required   Does not apply Continuous PRN Cady Jason MD        norepinephrine (LEVOPHED) 16 mg in  mL infusion MAX CONC CENTRAL LINE  0.01-0.6 mcg/kg/min (Dosing Weight) Intravenous Continuous Invasive, Cardiologist, MD 6 mL/hr at 06/15/24 1100 0.06 mcg/kg/min at 06/15/24 1100    POST-filter replacement solution for CVVHD & CVVHDF (PrismaSol BGK 2/3.5)   CRRT Continuous Casie,  Cady Steinberg MD        PRE-filter replacement solution for CVVHD & CVVHDF (PrismaSol BGK 2/3.5)  12.5 mL/kg/hr CRRT Continuous CasieCady reyes MD        vasopressin 1 unit/mL MAX Conc (PITRESSIN) infusion  0.5-4 Units/hr Intravenous Continuous Invasive, Cardiologist, MD 4 mL/hr at 06/15/24 1244 4 Units/hr at 06/15/24 1244

## 2024-06-15 NOTE — PROGRESS NOTES
Shift summary [phone]    Major Shift Events:    No acute changes during shift. Plan for head CT 5/16 to assess neuro function. Remains vented. Paced, ECMO-see ecmo note, pulses remain doppler. GI bleed continues. Patient started on CRRT, remains anuric. 1 PRBC and 1 platelets given.    For vital signs and complete assessments, please see documentation flowsheets.

## 2024-06-15 NOTE — PROGRESS NOTES
EEG CLINICAL NEUROPHYSIOLOGY PRELIMINARY REPORT    Video EEG monitoring through approximately 6 AM today reviewed.  As best as can be gathered from electronic medical record, patient not currently treated with a sedative drips.    Findings are severely abnormal.  No clear electrocerebral activity for long portions of the recording, even at high sensitivity.  No reactivity.  Findings consistent with profound diffuse encephalopathy.  No seizures.    Full report in chart.    Kwaku Luna MD  Contact information for physicians covering Epilepsy and EEG is available on [facility].  Click search, enter neurology adult/[facility] in group name box, click on neurology adult/[facility], then click Staff Epilepsy and EEG.

## 2024-06-15 NOTE — PLAN OF CARE
Major shift events: Patient unresponsive and pupils fixed off sedation. EEG in place. V paced with frequent non perfusing PVCs. MAP > 65 on levo, epi, and vaso. IABP 1:1 100% augmentation. VA ECMO flowing 4.5 LPM at 4700 RPMs, sweep 1, FiO2 50%. Vent settings unchanged 10/450/7/40%. Anuric. Rectal tube in place with j carlos coagulated bloody output. OG to LIS. 1 unit RBC and 1 unit platelets given. Skin edematous and weeping. Continue to monitor patient and update team with further changes. See flowsheet for details and assessment.

## 2024-06-15 NOTE — PROGRESS NOTES
ECMO Attending Progress Note  6/15/2024    Ismael Hodgson is a 89 year old male who was cannulated for ECMO 2024 due to refractory VF arrest    Cannulation Site:  17 Fr in the R femoral artery  25 Fr in the R femoral vein    Interval events: increased bloody stool    Pulsatilty (IABP paused if applicable):25 mmHG     Physical Exam:  Temp:  [97.3  F (36.3  C)-98.8  F (37.1  C)] 98.6  F (37  C)  Pulse:  [65-98] 68  Resp:  [10] 10  MAP:  [61 mmHg-302 mmHg] 68 mmHg  Arterial Line BP: ()/(36-64) 100/48  FiO2 (%):  [30 %-40 %] 40 %  SpO2:  [82 %-100 %] 100 %    Intake/Output Summary (Last 24 hours) at 6/15/2024 1216  Last data filed at 6/15/2024 1100  Gross per 24 hour   Intake 1899.72 ml   Output 500 ml   Net 1399.72 ml      Vent Mode: CMV/AC  (Continuous Mandatory Ventilation/ Assist Control)  FiO2 (%): 40 %  Resp Rate (Set): 10 breaths/min  Tidal Volume (Set, mL): 450 mL  PEEP (cm H2O): 7 cmH2O  Resp: 10       Labs:  Recent Labs   Lab 06/15/24  0943 06/15/24  0753 06/15/24  0546 06/15/24  0504 06/15/24  0354   PH 7.38 7.38 7.37  --  7.37   PCO2 39 39 41  --  40   PO2 100 111* 70*  --  92   HCO3 23 23 23  --  23   O2PER 50 50 40 40 40  40  40      Recent Labs   Lab 06/15/24  0943 06/15/24  0354 24  2142 24  1552   WBC 18.9* 20.1* 20.4* 21.7*   HGB 7.9* 8.5* 8.4* 7.8*     Creatinine   Date Value Ref Range Status   06/15/2024 5.46 (H) 0.67 - 1.17 mg/dL Final   06/15/2024 5.26 (H) 0.67 - 1.17 mg/dL Final   2024 5.05 (H) 0.67 - 1.17 mg/dL Final   2024 4.99 (H) 0.67 - 1.17 mg/dL Final   Blood Flow (Circuit) LPM: 4.68 LPM  Sweep LPM: 1 LPM  Sweep FiO2   %: 50 %  ACT  (seconds): 148 seconds  Pulse Oximetry  (SpO2%): 100 %  Arterial Pressure  mmH mmHg    ECMO Issues including assessments and plan on DOS 6/15/2024:  Neuro: Sedated for mechanical ventilation and ECMO.  No acute distress.  NIRS stable b/l  RASS goal: -3  CV: Cardiogenic shock.  Hemodynamically stable on multiple  low/moderate pressors  Pulm: Keep vent settings at rest settings as above.  FEN/Renal: CRRT  Heme: ACT goal: 140-160, Hemoglobin stable.  Minimal oozing around the ECMO cannulas.  ID: Receiving empiric antibiotics  Cannulae: Position is acceptable on exam and the available imaging.  Distal perfusion cannula is in place and patent.  Extremities are well-perfused.     I have personally reviewed the ECMO flows, oxygenation and CO2 clearance, anticoagulation, and cannula position.  I have also personally assessed the patient's systemic response with hemodynamics, oxygenation, ventilation, and bleeding.       The patient requires continued ECMO support and management in the ICU.  I have discussed patient care and treatment plan with the primary team.      Paramjit Henley MD, PhD  Interventional/Critical Care Cardiology  [phone]    Dianna 15, 2024

## 2024-06-16 NOTE — PHARMACY-CONSULT NOTE
Pharmacy Tube Feeding Consult    Medication reviewed for administration by feeding tube and for potential food/drug interactions.    Recommendation: No changes are needed at this time.     Pharmacy will continue to follow as new medications are ordered.    Kimmy MurciaD  CVICU and Advanced Heart Failure Pharmacist  Pager [phone]

## 2024-06-16 NOTE — PROGRESS NOTES
ECMO Attending Progress Note  2024    Ismael Hodgson is a 89 year old male who was cannulated for ECMO 2024 due to refractory VF arrest    Cannulation Site:  17 Fr in the R femoral artery  25 Fr in the R femoral vein    Interval events: ongoing bloody stool    Pulsatilty (IABP paused if applicable): 30 mmHG     Physical Exam:  Temp:  [98.4  F (36.9  C)-98.8  F (37.1  C)] 98.6  F (37  C)  Pulse:  [60-85] 76  Resp:  [10] 10  MAP:  [58 mmHg-80 mmHg] 72 mmHg  Arterial Line BP: ()/(37-61) 104/51  FiO2 (%):  [40 %-70 %] 70 %  SpO2:  [83 %-100 %] 86 %    Intake/Output Summary (Last 24 hours) at 2024 1240  Last data filed at 2024 1200  Gross per 24 hour   Intake 2100.77 ml   Output 2618 ml   Net -517.23 ml      Vent Mode: CMV/AC  (Continuous Mandatory Ventilation/ Assist Control)  FiO2 (%): (S) 70 %  Resp Rate (Set): 10 breaths/min  Tidal Volume (Set, mL): 450 mL  PEEP (cm H2O): 7 cmH2O  Resp: 10       Labs:  Recent Labs   Lab 24  1157 24  1006 24  0830 24  0609   PH 7.42 7.44 7.45 7.44   PCO2 39 37 35 36   PO2 72* 147* 131* 117*   HCO3 25 25 24 24   O2PER 40 40 40 40      Recent Labs   Lab 24  1006 24  0403 06/15/24  2213 06/15/24  1546   WBC 16.7* 17.6* 17.6* 17.9*   HGB 7.8* 7.8* 8.4* 8.1*     Creatinine   Date Value Ref Range Status   2024 3.11 (H) 0.67 - 1.17 mg/dL Final   2024 3.64 (H) 0.67 - 1.17 mg/dL Final   06/15/2024 4.16 (H) 0.67 - 1.17 mg/dL Final   06/15/2024 4.16 (H) 0.67 - 1.17 mg/dL Final   Blood Flow (Circuit) LPM: 4.56 LPM  Sweep LPM: 1 LPM  Sweep FiO2   %: 40 %  ACT  (seconds): 148 seconds  Pulse Oximetry  (SpO2%): 100 %  Arterial Pressure  mmH mmHg    ECMO Issues including assessments and plan on DOS 2024:  Neuro: Sedated for mechanical ventilation and ECMO.  No acute distress.  NIRS stable b/l  RASS goal: -3  CV: Cardiogenic shock.  Hemodynamically stable on multiple low/moderate pressors  Pulm: Keep vent  settings at rest settings as above.  FEN/Renal: CRRT  Heme: ACT goal: 140-160, Hemoglobin stable.  Minimal oozing around the ECMO cannulas.  ID: Receiving empiric antibiotics  Cannulae: Position is acceptable on exam and the available imaging.  Distal perfusion cannula is in place and patent.  Extremities are well-perfused.     I have personally reviewed the ECMO flows, oxygenation and CO2 clearance, anticoagulation, and cannula position.  I have also personally assessed the patient's systemic response with hemodynamics, oxygenation, ventilation, and bleeding.       The patient requires continued ECMO support and management in the ICU.  I have discussed patient care and treatment plan with the primary team.      Paramjit Henley MD, PhD  Interventional/Critical Care Cardiology  [phone]    June 16, 2024

## 2024-06-16 NOTE — PROGRESS NOTES
CRRT STATUS NOTE    DATA:  Time:  5:52 PM  Pressures WNL:  YES  Filter Status:  WDL    Problems Reported/Alarms Noted:  None    Supplies Present:  YES    ASSESSMENT:  Patient Net Fluid Balance:  -1800ml today  Vital Signs:  B/P: 100/45, T: 98.4, P: 73, R: 10   Labs:  K 3.5  Goals of Therapy:  0-50/hr    INTERVENTIONS:   None    PLAN:  Continue current plan.

## 2024-06-16 NOTE — PROGRESS NOTES
CLINICAL NUTRITION SERVICES - BRIEF NOTE    See RD note 6/13 for full assessment.     Nutrition Prescription    RECOMMENDATIONS FOR MDs/PROVIDERS TO ORDER:  None currently     Recommendations already ordered by Registered Dietitian (RD):  EN access: OGT   Due to pressor requirements, recommend only trickle TF at this time:    Novasource Renal (or equivalent) @ 15 ml/hr  30 ml Q4H fluid flushes for tube patency. Additional fluids and/or adjustments per MD.    Renal multivitamin/minerals to help ensure micronutrient needs being met with suspected hypermetabolic demands and potential interruptions to TF infusions.   100 mg Thiamine x 5-7 days to prevent lyte depletion d/t at risk for refeeding syndrome  Due to gastric enteral access: HOB > 30 degrees       Future/Additional Recommendations:  Once patient is on decreased pressor support, advance TF:  Goal TF:  Novasource Renal (or equivalent) @ 35 ml/hr (840 ml) + 5 pkt Prosource TF20 provides 2080 kcal (24 kcal/kg), 176 g pro (2 g/kg), 154 g CHO, 602 ml free water, and 0 g fiber daily.   Initiate @ 15 ml/hr and advance by 10 ml q8hr as tolerated   Do not advance TF rate unless Mg++ > 1.5, K+ is > 3, and phos > 1.9       New findings:   Consult to begin TF.   Pt continues on ECMO    Diet: NPO    Intake/Tolerance: NPO since 6/12.    GI:  Last BM: 06/15/24  rectal tube output: 850 mL 6/15, 100 mL 6/14, 600 mL 6/13, 500 mL 6/12    Weight:  Most Recent Weight: 110.3 kg (243 lb 2.7 oz)  on 6/16/24 via Bed scale  Body mass index is 34.04 kg/m .  Wt up 3 kg from admission. + 5.9 L from admission per I/O.     Meds:  Epi @ 0.06; Norepi @ 0.04; vaso @ 4    Labs:   06/14/24 15:52 06/14/24 21:42 06/15/24 03:54 06/15/24 09:43 06/15/24 15:46 06/15/24 22:13 06/16/24 04:03   Sodium 141 141 141 141 143 144  144 141   Potassium 4.9 5.0 4.9 4.9 4.7 4.3  4.3 3.8   Chloride 108 (H) 108 (H) 109 (H) 108 (H) 111 (H) 111 (H)  111 (H) 107   Carbon Dioxide (CO2) 21 (L) 20 (L) 21 (L) 21 (L) 21 (L)  "22  22 22   Urea Nitrogen 30.6 (H) 32.6 (H) 35.3 (H) 39.2 (H) 37.4 (H) 31.4 (H)  31.4 (H) 28.2 (H)   Creatinine 4.99 (H) 5.05 (H) 5.26 (H) 5.46 (H) 5.10 (H) 4.16 (H)  4.16 (H) 3.64 (H)   GFR Estimate 12 (L) 12 (L) 11 (L) 11 (L) 12 (L) 15 (L)  15 (L) 18 (L)   Calcium 8.7 (L) 8.5 (L) 8.5 (L) 8.6 (L) 8.7 (L) 8.7 (L)  8.7 (L) 8.8   Anion Gap 12 13 11 12 11 11  11 12   Magnesium 2.2 2.2 2.3 2.2 2.1 2.0 1.9   Phosphorus  6.3 (H) 6.3 (H)   4.6 (H)  4.6 (H) 3.9   Albumin 2.0 (L) 2.0 (L) 2.1 (L) 2.1 (L) 2.1 (L) 2.2 (L)  2.2 (L) 2.1 (L)   Protein Total 4.1 (L) 4.0 (L) 4.2 (L) 4.2 (L) 4.2 (L) 4.2 (L) 4.1 (L)   Alkaline Phosphatase 67 62 64 95 68 65 60   ALT 40 40 35 34 30 27 25    (H) 260 (H) 229 (H) 211 (H) 187 (H) 180 (H) 166 (H)   Bilirubin Direct       1.05 (H)   Bilirubin Total 0.8 1.1 1.1 1.2 1.4 (H) 1.5 (H) 1.6 (H)   Calcium Ionized Whole Blood 5.0 4.9 4.9 4.9 5.0 5.1 5.1   CRP Inflammation   76.10 (H)    68.10 (H)   Glucose 158 (H) 150 (H) 147 (H) 147 (H) 143 (H) 135 (H)  135 (H) 131 (H)   Lactic Acid 3.3 (H) 2.8 (H)  2.3 (H) 2.1 (H) 2.0 1.9       Renal:  CRRT    Respiratory:   Intubated on mechanical vent    Implementation  Enteral Nutrition - Initiate  Feeding tube flush  Multivitamin/mineral supplement therapy       RD to follow per protocol    Tiffanie Reardon, MS, RDN, LD  Weekend/Holiday RD Vocera - \"Weekend Clinical Dietitian\"    "

## 2024-06-16 NOTE — PROGRESS NOTES
Cardiology ICU Progress Note    Brief HPI:  Tyler Renteria is 65 YO male with past medical history of end-stage kidney disease on hemodialysis MWF, coronary artery disease (status post CABG 2001, redo CABG 2011, multiple PCIs of unknown targets), afib, type 2 diabetes, ischemic cardiomyopathy (LVEF 30-35% on 4/19/2024) and trifascicular block status post PPM. He was recently admitted to [facility] from 5/21/24 to 5/31/24 for CHF exacerbation, hypotension, anemia and GI bleed. Underwent EGD 5/22/24 which showed 4 small non-bleeding angioectasias which were treated with APC and a single AVM in duodenum was treated with APC and clip. Colonoscopy on 5/24/24 showed multiple polyps and a large cecal polyp not amenable to removal endoscopically. Biopsy of mass consistent with tubulovillous adenoma. It was discussed that he would need hemicolectomy for removal of that mass but likely wouldn't tolerate the procedure currently as he didn't tolerate colonoscopy.     Yesterday, patient was [place] scooter on the freeway when he collapsed, CPR initiated. No-flow time around 5 minutes.  Received 16 shocks in total, rhythm was asystole and VT/VF, total downtime 1 hour.  Rhythm and Cath Lab was asystole. Underwent VA ECMO cannulation and placement of intra-aortic balloon pump.  Coronary angiogram showed  of the ostial circumflex with a patent vein graft to OM1 and OM 2, small to medium caliber LAD with patent LIMA to distal LAD graft, completely occluded large caliber diagonal vessel due to significant in-stent stenosis, diffusely diseased native RCA with extremely sluggish flow and a patent vein graft to RPDA. No intervention performed. Initial rhythm Asystole/VF.    Subjective and Interval:  No acute issues over the night. Yesterday received a total of 3 PRBC and 2 PLT, dark red blood out rectal tube. Heparin goal increased yesterday to -180. No increase in bleeding. Continues off sedation. Unresponsive, pupils  fixed, no cough, gag, not over breathing vent. Continues paced at 60, IABP with 25 points of pulsatility. Continues on EPi 0.06, Norepi 0.04, Vaso 4. Lactic now normal. CRRT initioated yesterday for volume removal.  Trickle feeding.    Assessment and plan by system:   Today's changes:  - Continue to remove fluid  - Head CT today     Neurology   # Concern for anoxic brain injury    # Chronic appearing old infarct  - Intubated, sedated. Avoiding hyperthermia  - Neuro-crit consulted and appreciate recommendations.   - vEEG 6/14: Severely abnormal. No clear electrocerebral activity for long portions of the recording, even at high sensitivity. No reactivity. Findings consistent with profound diffuse encephalopathy. No seizures.   - Trend S100 B and Neuron specific enolase   - Palliative care consulted.     Pupils 6mm, non reactive, NPI 0, no gag or withdrawal, no longer over breathing vent, not sedation.     CT head reveals Chronic appearing old infarct in the right middle frontal gyrus, gliosis of the underlying subcortical white matter. Correlation with prior imaging would be helpful if available, Gray-white matter differentiation is mostly preserved apart from the aforementioned chronic finding within the limits of the exam. However virtual nonenhanced CT shows diffuse loss of gray-white matter differentiation loss throughout the parenchyma. This could be artifactual. Recommend follow-up CT in 12-24 hours.    US Carotid less than 50% bilaterally     Current sedation:  RASS -2 to -3 once wakeful  None    Plan:  - Avoid sedation  - Spontaneous awakening trial daily as tolerated  - Permissive hypercapnea and hypernatremia for neuroprotection     Cardiovascular  # VF Cardiac arrest   # Cardiogenic shock   # CAD  # Cardiomyopathy  # Dyslipidemia  # CAD s/p CABG 2001, redo CABG 2011, multiple PCIs  # ICM - LVEF 30-35% on 4/19/2024  TTE: EF 10-15%, RV severely reduced, aortic valve opening  EKG: ECG Rhythm: Ventricular paced  rhythm  Angiogram:   Ost Cx to Dist Cx lesion is 100% stenosed.    1st Diag lesion is 100% stenosed.    2nd Mrg lesion is 100% stenosed.    Central Venous Catheter  was placed. Ultrasound was used to visualize the left femoral vein. Placement was successful.    Arterial Line was placed. Ultrasound was used to visualize the right radial artery right radial artery. Placement was successful.     VT/V-fib arrest.  Successful ECMO cannulation with 17 [place] right common femoral arterial and 25 [place] right femoral venous cannulas.  Successful intra-aortic balloon pump placement via 9 [place] left common femoral arterial sheath.  Successful placement of triple-lumen central venous catheter in the left femoral vein.  Successful placement of right radial arterial line.        Severe multivessel coronary disease status post coronary bypass grafting.  Ostial circumflex is completely occluded with patent vein graft to OM1 and OM 2.  Small to medium caliber LAD with patent LIMA to distal LAD graft. Distal LAD supplies very small territory.  Completely occluded large caliber diagonal vessel due to significant in-stent restenosis.  Native RCA is diffusely disease with extremely sluggish flow.  Patent vein graft to RPDA.     ECMO:  RPM's: 3700 with Blood Flow (Circuit) LPM  Av.6 LPM    Min: 4.52 LPM  Max: 4.64 LPM L/min  Sweep is at (S) 1 LPM and 50 %.   Extremities are warm, wheaping on legs  Current Pressors/inotropes/antiarrythmic:    Dose (mcg/kg/min) Norepinephrine: 0.07 mcg/kg/min, Dose (units/hr) Vasopressin: 3 Units/hr, and Dose (mcg/kg/min) Epinephrine: 0.05 mcg/kg/min    Hemodynamics:  Art Line  Arterial Line BP: 104/51  Arterial Line MAP (mmHg): 72 mmHg  Arterial Line Location: Right radial  Art Line Wave Form: Appropriate wave forms      Plan:  - ASA  - Hold lipitor for now given shock liver  - Hold ACE/ARB for now given likely reduced renal fxn after arrest  - Holding beta blocker given shock  - Telemetry  monitoring  - Trending troponin      Pulmonary  # Acute hypoxemic respiratory failure requiring intubation  # Probable aspiration pneumonia  # Pulmonary edema    Vent Settings:   Vent Mode: CMV/AC  (Continuous Mandatory Ventilation/ Assist Control)  FiO2 (%): (S) 70 %  Resp Rate (Set): 10 breaths/min  Tidal Volume (Set, mL): 450 mL  PEEP (cm H2O): 7 cmH2O  Resp: 10      ABG:   Recent Labs   Lab 06/16/24  1006 06/16/24  0830 06/16/24  0609   PH 7.44 7.45 7.44   PCO2 37 35 36   PO2 147* 131* 117*   HCO3 25 24 24   O2PER 40 40 40       CXR: Pulmonary edema    Plan:  - Wean vent as able  - Daily CXR  - Serial ABGs   - Consider scheduled duonebs if signs of lung dz, currently PRN    - Peridex        Gastrointestinal, Nutrition  # Concern for Shock liver 2/2 cardiac arrest  # Hypoalbuminemia   # Upper and lower GI bleed   No known medical hx.     Recent Labs   Lab 06/16/24  1006 06/16/24  0403 06/15/24  2213   * 166* 180*   ALT 21 25 27   BILITOTAL 1.8* 1.6* 1.5*   ALBUMIN 2.1* 2.1* 2.2*  2.2*   PROTTOTAL 4.1* 4.1* 4.2*   ALKPHOS 56 60 65       Diet    Plan:  - Monitor LFTs  - Bowel regimen in place  - GI Prophylaxis: PPI; Protonix 40 mg daily     Renal, Electrolytes  # Lactic acidosis  # ESRD on HD MWF   I/O last 3 completed shifts:  In: 2112.24 [I.V.:910.24]  Out: 1873 [Emesis/NG output:100; Other:1073; Stool:700]   Recent Labs   Lab 06/16/24  1006 06/16/24  0403 06/15/24  2213 06/15/24  0943 06/15/24  0354    141 144  144   < > 141   POTASSIUM 3.6 3.8 4.3  4.3   < > 4.9   CHLORIDE 107 107 111*  111*   < > 109*   CO2 22 22 22  22   < > 21*   BUN 24.6* 28.2* 31.4*  31.4*   < > 35.3*   CR 3.11* 3.64* 4.16*  4.16*   < > 5.26*   PHOS  --  3.9 4.6*  4.6*  --  6.3*   MAG 2.1 1.9 2.0   < > 2.3    < > = values in this interval not displayed.          Plan:  - Avoid nephrotoxins, renally dose meds  - Monitor urine output    Infectious Disease  # Aspiration Pneumonia- in the setting of arrest.  CXR/CAP as  above.   # Leukocytosis  Recent Labs   Lab 24  1006 24  0403 06/15/24  2213   WBC 16.7* 17.6* 17.6*     Temp (24hrs), Av.6  F (37  C), Min:97.3  F (36.3  C), Max:98.8  F (37.1  C)       Cultures thus far:   6/15 sputum  Non lactose fermenting gram negative bacilli   Antibiotics     Anti-infectives (From now, onward)      Start     Dose/Rate Route Frequency Ordered Stop    24  cefTRIAXone (ROCEPHIN) 2 g vial to attach to  ml bag for ADULTS or NS 50 ml bag for PEDS         2 g  over 30 Minutes Intravenous EVERY 24 HOURS 24 18224                          Plan:  - Continue Ceftriaxone x 7D for aspiration coverage  -  Monitor for signs of infection  -  Avoid fevers       Hematology  # Anemia of critical illness  # Tubulovillous adenoma of the cecum   Recent Labs   Lab 24  1006 24  0403 06/15/24  2213   HGB 7.8* 7.8* 8.4*   HCT 23.1* 23.2* 24.5*   * 93* 99*     Recent Labs   Lab 24  1006 24  0403 06/15/24  2213   INR 1.50* 1.50* 1.50*   PTT 43* 42* 43*     Hgb stable. No e/o bleeding.    DVT PPX: Heparin infusion straight rate    Plan:  - CANDACE for DVT ppx  - Transfusion parameters:   - 1u PRBC for hbg < 8   - 1u platelet for plt < 50   - 1u FFP for INR > 3   - 5u cryoprecipitate for fibrinogen <150     Endocrinology  # Hyperglycemia   - No known medical history.   Hgb a1c   Recent Labs   Lab 24  190   A1C 4.8     Recent Labs   Lab 24  1217 24  1006 24  0826 24  0403   * 126*  130* 127* 125*  131*       Plan  - insulin gtt as needed    Integumentary:  Multiple bruising, weeping from sores on LE    Lines/Tubes/Drains:  Arterial Line 24 Radial (Active)   Site Assessment WDL 24 0800   Line Status Pulsatile blood flow 24 0800   Arterine Line Cap Change Due 24 0800   Art Line Waveform Appropriate 24 0800   Art Line Interventions Leveled;Connections checked and  tightened;Flushed per protocol 06/16/24 0800   Color/Movement/Sensation Cool fingers/toes;Capillary refill greater than 3 sec 06/16/24 0800   Line Necessity Yes, meets criteria 06/16/24 0800   Dressing Type Transparent 06/16/24 0800   Dressing Status Clean, dry, intact 06/16/24 0800   Dressing Change Due 06/19/24 06/16/24 0800   Number of days: 4       Arterial Sheath  06/12/24 9 Fr Femoral (Active)   Specific Qualities Sutured;Stat Locked 06/16/24 0800   Site Assessment WDL 06/16/24 0800   Dressing Type Securing device;Sutured 06/16/24 0800   Dressing Intervention Dressing changed/new dressing 06/14/24 0000   Arterial Sheath Comment IABP 06/16/24 0800   Number of days: 4       CVC Triple Lumen Left Femoral Non - valved (open ended);Non - tunneled (Active)   Site Assessment WDL 06/16/24 0800   Dressing Chlorhexidine disk;Transparent 06/16/24 0800   Dressing Status clean;dry;intact 06/16/24 0800   Dressing Intervention dressing changed 06/14/24 0000   Dressing Change Due 06/21/24 06/16/24 0800   Line Necessity Yes, meets criteria 06/16/24 0800   Blue - Status saline locked 06/16/24 0800   Blue - Cap Change Due 06/16/24 06/16/24 0800   Brown - Status infusing 06/16/24 0800   Brown - Cap Change Due 06/16/24 06/16/24 0800   White - Status infusing 06/16/24 0800   White - Cap Change Due 06/16/24 06/16/24 0800   Phlebitis Scale 0-->no symptoms 06/16/24 0800   Infiltration? no 06/16/24 0800   CVC Comment CDI 06/14/24 0400   Number of days:        ECMO Cannula Arterial Right femoral artery (Active)   Site Assessment WDL;Sutured;Secure 06/16/24 0800   Skin Assessment Dry;Intact 06/16/24 0800   Dressing Type Silverlon;Ioban 06/16/24 0800   Dressing Status dry;old drainage;intact 06/16/24 0800   Site Intervention No intervention needed 06/16/24 0800   Number of days: 4       ECMO Cannula Venous Right femoral vein (Active)   Site Assessment WDL;Sutured;Secure 06/16/24 0800   Skin Assessment Dry;Intact 06/16/24 0800   Skin  Intervention Foam dressing;Protective barrier applied 06/12/24 1748   Dressing Type Ioban;Silverlon 06/16/24 0800   Dressing Status dry;old drainage;intact 06/16/24 0800   Site Intervention No intervention needed 06/16/24 0800   Number of days: 4       Distal Perfusion Catheter Right superficial femoral artery (Active)   Site Assessment WDL;Sutured;Secure 06/16/24 0800   Dressing Type Silverlon;Ioban 06/16/24 0800   Dressing Status intact;dry;old drainage 06/16/24 0800   Site Intervention No intervention needed 06/16/24 0800   Number of days: 4       Hemodialysis Vascular Access AV fistula Left Forearm (Active)   Site Assessment UTV 06/16/24 0800   Dressing Intervention Other (Comment) 06/13/24 0400   Dressing Status Intact 06/16/24 0800   Number of days:        ETT Cuffed 8 mm (Active)   Secured at (cm) 24 cm 06/16/24 1105   Measured from Lips 06/16/24 1105   Position Left 06/16/24 0800   Secured by Commercial tube cardenas 06/16/24 1105   Bite Block None Present 06/16/24 1105   Site Appearance Clean;Dry 06/16/24 0800   Tube Care Site care done 06/16/24 0800   Cuff Assessment Minimal leak technique 06/16/24 1105   Safety Measures Manual resuscitator at bedside 06/16/24 1105   Number of days: 4       NG/OG/NJ Tube Orogastric Center mouth (Active)   Site Description WDL 06/16/24 0800   Status Clamped 06/16/24 0800   Drainage Appearance Bile;Green 06/16/24 0800   Placement Confirmation Tres [place]royos unchanged 06/16/24 0800   T[place] (cm marking) at nare/mouth 67 cm 06/16/24 0800   Intake (ml) 0 ml 06/13/24 0400   Flush/Free Water (mL) 30 mL 06/14/24 2000   Container Amount 100 mL 06/16/24 0400   Output (ml) 0 ml 06/16/24 0400   Number of days: 4       Rectal Tube With balloon (Active)   Balloon fill volume  45 cc 06/12/24 2000   Stool Leakage Medium 06/15/24 1200   Rectal Tube Container Volume 650 ml 06/16/24 1100   Rectal Tube Output 250 ml 06/16/24 1100   Number of days: 4       Wound Arm (Active)   Wound Bed Other  "(Comment) 06/16/24 0800   Renée-wound Assessment Ecchymosis 06/16/24 0800   Drainage Amount UTV 06/16/24 0800   Drainage Color/Characteristics UTV 06/16/24 0800   Wound Care/Cleansing Wound cleanser 06/15/24 0800   Dressing Foam 06/16/24 0800   Dressing Status Clean, dry, intact 06/16/24 0800   Number of days: 4       Wound Sternum Friction injury;Abrasion(s) (Active)   Wound Bed Erythema, non-blanchable, skin intact 06/16/24 0800   Renée-wound Assessment Erythema 06/16/24 0800   Drainage Amount None 06/16/24 0800   Wound Care/Cleansing Wound cleanser 06/13/24 2000   Dressing Open to air 06/16/24 0800   Number of days: 4          ICU  Feeding: Start feeding  Analgesia:  None  Sedation: none  Thrombopx: Heparin infusion  Head of bed: reverse trend   Ulcers: PPI  Glucose: Insulin infusion if needed  Medically Ready for Discharge: Anticipated in 5+ Days       Family will be updated by me    Patient seen and discussed with staff physician Dr. Henley.    Olivia Castillo DNP, APRN [facility]  Cardiology ICU  Pager [phone] or   Send message Securely via [employer] with the [employer] Web Console        Objective:  Most recent vital signs:  /45   Pulse 76   Temp 98.6  F (37  C)   Resp 10   Ht 1.8 m (5' 10.87\")   Wt 110.3 kg (243 lb 2.7 oz)   SpO2 (!) 86%   BMI 34.04 kg/m    Temp:  [98.4  F (36.9  C)-98.8  F (37.1  C)] 98.6  F (37  C)  Pulse:  [60-85] 76  Resp:  [10] 10  MAP:  [58 mmHg-80 mmHg] 72 mmHg  Arterial Line BP: ()/(37-61) 104/51  FiO2 (%):  [40 %-70 %] 70 %  SpO2:  [83 %-100 %] 86 %      Physical exam:  General: In bed, in NAD  HEENT: Pupils Not reactive  CARDIAC: RRR, no m/r/g appreciated. Peripheral pulses dopplered  RESP: Mechanical ventilation; CTAB, no wheezes, rhonchi or crackles appreciated.  GI: soft, BS hypoactive  : Montesinos  EXTREMITIES: NO LE edema, pulses 2+. Femoral access site w/o bleeding, dressing c/d/i.   SKIN: No acute lesions appreciated  NEURO: Intubated, no gag, pupils not " reactive    Imaging/procedure results:  XR Chest Port 1 View  Narrative: XR CHEST PORT 1 VIEW  6/16/2024 3:01 AM     HISTORY:  Check endotracheal tube placement and ECLS cannula  placement. DO NOT log-roll patient.  Place film under patient using  patient safety handling process.       COMPARISON:  6/15/2024    TECHNIQUE: Portable, supine, frontal projection radiograph of the  chest.    FINDINGS:   Stable position of inferior approach ECMO cannula, right chest wall  cardiac pacer, gastric tube, ET tube, and intra-aortic balloon pump.    Trachea is midline. Stable cardiomegaly. Coronary stent/coronary  atherosclerosis. Stable retrocardiac opacity silhouetting the left  hemidiaphragm. Trace blunting of the left costophrenic angle. Sharp  right costophrenic angle. No pneumothorax.  Impression: IMPRESSION:  Stable support devices. Similar left basilar opacification, likely  atelectasis versus edema.    I have personally reviewed the examination and initial interpretation  and I agree with the findings.    DANIEL MOY MD         SYSTEM ID:  [id number]     Recent Results (from the past 24 hour(s))   XR Chest Port 1 View    Narrative    XR CHEST PORT 1 VIEW  6/16/2024 3:01 AM     HISTORY:  Check endotracheal tube placement and ECLS cannula  placement. DO NOT log-roll patient.  Place film under patient using  patient safety handling process.       COMPARISON:  6/15/2024    TECHNIQUE: Portable, supine, frontal projection radiograph of the  chest.    FINDINGS:   Stable position of inferior approach ECMO cannula, right chest wall  cardiac pacer, gastric tube, ET tube, and intra-aortic balloon pump.    Trachea is midline. Stable cardiomegaly. Coronary stent/coronary  atherosclerosis. Stable retrocardiac opacity silhouetting the left  hemidiaphragm. Trace blunting of the left costophrenic angle. Sharp  right costophrenic angle. No pneumothorax.        Impression    IMPRESSION:  Stable support devices. Similar left basilar  opacification, likely  atelectasis versus edema.    I have personally reviewed the examination and initial interpretation  and I agree with the findings.    DANIEL MOY MD         SYSTEM ID:  [id number]

## 2024-06-16 NOTE — PROGRESS NOTES
[facility]    ECLS Shift Summary:     ECMO Equipment:  Console Serial Number: [id number]  Circuit Lot Number: [id number]  Oxygenator Lot Number: [id number]    Circuit Assessment: Free of fibrin, clot, and air    Arterial ECMO Cannula: 17 Fr in the Right Femoral Artery  Venous ECMO Cannula: 25 Fr in the Right Femoral Vein  Distal Perfusion Catheter: 8 Fr in the Right Superficial Femoral Artery    ECMO Cannula Arterial Right femoral artery-Site Assessment: WDL, Sutured  ECMO Cannula Venous Right femoral vein-Site Assessment: WDL, Sutured  Distal Perfusion Catheter Right superficial femoral artery-Site Assessment: WDL  ECMO Cannula Arterial Right femoral artery-Site Intervention: No intervention needed  ECMO Cannula Venous Right femoral vein-Site Intervention: No intervention needed  Distal Perfusion Catheter Right superficial femoral artery-Site Intervention: No intervention needed    Patient remains on V-A ECMO, all equipment is functioning and alarms are appropriately set. RPM's: 3700 with Blood Flow (Circuit) LPM  Av.5 LPM  Min: 4.48 LPM  Max: 4.49 LPM L/min. Sweep is at 1 LPM and 40 %. Extremities are warm and perfused. Bilateral lower legs swollen and weeping.     Significant Shift Events: During shift pt had j carlos blood stools leaking around rectal tube. Pt received blood products to stay within Hgb goals.     Vent settings:  Vent Mode: CMV/AC  (Continuous Mandatory Ventilation/ Assist Control)  FiO2 (%): 40 %  Resp Rate (Set): 10 breaths/min  Tidal Volume (Set, mL): 450 mL  PEEP (cm H2O): 7 cmH2O  Resp: 10      Anticoagulation:  Dose (units/hr) HEParin: 500 Units/hr  Rate (mL/hr) HEParin: 5 mL/hr  Concentration HEParin: 100 Units/mL        Most recent: ACT  (seconds): 148 seconds    Urine output is minimal.  .  Blood loss was ~450 mL from rectal tube. Product given included PRBCs x2 units, Plts x1 unit.     Intake/Output Summary (Last 24 hours) at 2024  0630  Last data filed at 6/16/2024 0500  Gross per 24 hour   Intake 1709.24 ml   Output 1793 ml   Net -83.76 ml       Labs:  Recent Labs   Lab 06/16/24  0609 06/16/24  0521 06/16/24  0403 06/16/24  0152 06/16/24  0002   PH 7.44  --  7.43 7.45 7.43   PCO2 36  --  37 35 36   PO2 117*  --  140* 113* 149*   HCO3 24  --  25 24 24   O2PER 40 60 40  40  40 40 40       Lab Results   Component Value Date    HGB 7.8 (L) 06/16/2024    PHGB <30 06/16/2024    PLT 93 (L) 06/16/2024    FIBR 260 06/16/2024    INR 1.50 (H) 06/16/2024    PTT 42 (H) 06/16/2024    DD 2.69 (H) 06/16/2024    ANTCH 37 (L) 06/15/2024       Plan is head CT-scan, continued VA-ECMO cares.    Cal Jung RN  ECMO Specialist  6/16/2024 6:30 AM

## 2024-06-16 NOTE — PLAN OF CARE
Goal Outcome Evaluation:      Plan of Care Reviewed With: patient    Overall Patient Progress: no changeOverall Patient Progress: no change         Major Shift Events:  No acute changes during shift. PRBC x2 and plts x1 administered. Head CT completed. Remains vented. Paced, ECMO-see ecmo note, pulses + w doppler. GI bleed continues. CRRT continued pt net negative -1.5L  Plan: Continue to monitor and call CICU with questions and concerns.   For vital signs and complete assessments, please see documentation flowsheets.

## 2024-06-16 NOTE — PROGRESS NOTES
[facility]    ECLS Shift Summary:     ECMO Equipment:  Console Serial Number: [id number]  Circuit Lot Number: [id number]  Oxygenator Lot Number: [id number]    Circuit Assessment: Free of fibrin, clot, and air    Arterial ECMO Cannula: 17 Fr in the Right Femoral Artery  Venous ECMO Cannula: 25 Fr in the Right Femoral Vein  Distal Perfusion Catheter: 8 Fr in the Right Superficial Femoral Artery            Patient remains on V-A ECMO, all equipment is functioning and alarms are appropriately set. RPM's: 3700 with Blood Flow (Circuit) LPM  Av.5 LPM  Min: 4.37 LPM  Max: 4.56 LPM L/min. Sweep is at (S) 0.5 LPM and (S) 50 %. Extremities are cool and edematous.     Significant Shift Events: Head CT completed.    Vent settings:  Vent Mode: CMV/AC  (Continuous Mandatory Ventilation/ Assist Control)  FiO2 (%): 40 %  Resp Rate (Set): 10 breaths/min  Tidal Volume (Set, mL): 450 mL  PEEP (cm H2O): 7 cmH2O  Resp: 10      Anticoagulation:  Dose (units/hr) HEParin: 550 Units/hr  Rate (mL/hr) HEParin: 5.5 mL/hr  Concentration HEParin: 100 Units/mL        Most recent: ACT  (seconds): 144 seconds    Pt on CRRT close to 2L net negative.  Blood loss was from rectum. Product given included x2 prbc and x1 platelet.     Intake/Output Summary (Last 24 hours) at 2024 1857  Last data filed at 2024 1819  Gross per 24 hour   Intake 2790.56 ml   Output 3939 ml   Net -1148.44 ml       Labs:  Recent Labs   Lab 24  1823 24  1619 24  1157 24  1006   PH 7.44 7.45 7.42 7.44   PCO2 37 36 39 37   PO2 116* 131* 72* 147*   HCO3 25 25 25 25   O2PER 40 40  40  40 40 40       Lab Results   Component Value Date    HGB 7.0 (L) 2024    PHGB <30 2024    PLT 84 (L) 2024    FIBR 242 2024    INR 1.50 (H) 2024    PTT 42 (H) 2024    DD 2.58 (H) 2024    ANTCH 42 (L) 2024       Plan is possible turndown tomorrow.    Feroz Estevez, RT  ECMO  Specialist  6/16/2024 6:57 PM

## 2024-06-16 NOTE — PLAN OF CARE
Major Shift Events:  No major events this shift.   Plan: Continue to monitor.  For vital signs and complete assessments, please see documentation flowsheets.

## 2024-06-16 NOTE — PROGRESS NOTES
"Nephrology Progress Note  06/16/2024       Tyler Renteria is 64 yom w/ESRD on HD MWF, CABG 2001, redo CABG 2011, multiple PCIs of unknown targets, afib, DM2, EF 30-35% with PPM for CHB. Recently admitted to [facility] 5/21-5/31/24 for CHF exacerbation, hypotension, anemia and GIB. EGD 5/22/24 w/4 non-bleeding angioectasias and single AVM in duodenum treated with APC and clip. Colonoscopy on 5/24/24 w/multiple polyps and a large cecal polyp not amenable to removal. 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 Now s/p arrest on 6/12, on ECMO with IABP, Nephrology consulted for management of dialysis post arrest with lactic acidosis.     Interval History : Patient remains critically ill. CRRT initiated yesterday. He now maintains a negative 1L balance since midnight with bloody stool output of 800 ml. Remains on 3 pressors.     Assessment & Recommendations:   ESRD-Unknown details other than MWF until records are available.  Currently on ECMO  Patient is severely hypervolemic however still requiring 3 pressors in the setting of cardiogenic shock.   --continue CRRT with UF of 0-50 ml/hr net negative                 -Access will be ECMO circuit.      # s/p Cardiac arrest - on ECMO   # Concern for anoxic brain injury - patient unresponsive with fixed pupils off sedation.  --repeat CT head is being planned.     Cady Jason MD      Review of Systems:   I reviewed the following systems:  ROS not done due to vent/sedation    Physical Exam:   I/O last 3 completed shifts:  In: 2112.24 [I.V.:910.24]  Out: 1873 [Emesis/NG output:100; Other:1073; Stool:700]   /45   Pulse 76   Temp 98.6  F (37  C)   Resp 10   Ht 1.8 m (5' 10.87\")   Wt 110.3 kg (243 lb 2.7 oz)   SpO2 (!) 86%   BMI 34.04 kg/m       General: Intubated and sedated.   HEENT: PERRL, no scleral icterus or injection  CARDIAC: RRR, no m/r/g appreciated. Peripheral pulses dopplered  RESP: " Mechanical ventilation; CTAB, no wheezes, rhonchi or crackles appreciated.  GI: soft, BS hypoactive  : Montesinos  EXTREMITIES:  2-3+ LE edema. pulses 2+. Femoral access site w/o bleeding, dressing c/d/i.   SKIN: No acute lesions appreciated  NEURO: Intubated and sedated    Labs:   All labs reviewed by me  Electrolytes/Renal -   Recent Labs   Lab Test 06/16/24  1217 06/16/24  1157 06/16/24  1006 06/16/24  0826 06/16/24  0403 06/15/24  2213   NA  --  138 138  --  141 144  144   POTASSIUM  --  3.6 3.6  --  3.8 4.3  4.3   CHLORIDE  --  105 107  --  107 111*  111*   CO2  --  23 22  --  22 22  22   BUN  --  23.4* 24.6*  --  28.2* 31.4*  31.4*   CR  --  2.97* 3.11*  --  3.64* 4.16*  4.16*   * 130* 126*  130*   < > 125*  131* 126*  128*  135*  135*   NAVI  --  8.9 8.8  --  8.8 8.7*  8.7*   MAG  --  2.1 2.1  --  1.9 2.0   PHOS  --  3.4  --   --  3.9 4.6*  4.6*    < > = values in this interval not displayed.       CBC -   Recent Labs   Lab Test 06/16/24  1157 06/16/24  1006 06/16/24  0403   WBC 17.0* 16.7* 17.6*   HGB 7.6* 7.8* 7.8*   PLT 97* 102* 93*       LFTs -   Recent Labs   Lab Test 06/16/24  1157 06/16/24  1006 06/16/24  0403 06/15/24  2213   ALKPHOS  --  56 60 65   BILITOTAL  --  1.8* 1.6* 1.5*   ALT  --  21 25 27   AST  --  156* 166* 180*   PROTTOTAL  --  4.1* 4.1* 4.2*   ALBUMIN 2.0* 2.1* 2.1* 2.2*  2.2*       Iron Panel - No lab results found.        Current Medications:  Current Facility-Administered Medications   Medication Dose Route Frequency Provider Last Rate Last Admin    aspirin (ASA) chewable tablet 81 mg  81 mg Oral or Feeding Tube Daily Shaye Castillo APRN CNP   81 mg at 06/16/24 0835    B and C vitamin Complex with folic acid (NEPHRONEX) liquid 5 mL  5 mL Per Feeding Tube Daily Shaye Castillo APRN CNP        cefTRIAXone (ROCEPHIN) 2 g vial to attach to  ml bag for ADULTS or NS 50 ml bag for PEDS  2 g Intravenous Q24H Aries Albert MD   2 g at 06/15/24 0710     chlorhexidine (PERIDEX) 0.12 % solution 15 mL  15 mL Mouth/Throat Q12H Aries Albert MD   15 mL at 06/16/24 0835    pantoprazole (PROTONIX) IV push injection 40 mg  40 mg Intravenous BID Hermila Saba APRN CNP   40 mg at 06/16/24 0835    thiamine (B-1) tablet 100 mg  100 mg Oral or Feeding Tube Daily Shaye Castillo APRN CNP   100 mg at 06/16/24 0835     Current Facility-Administered Medications   Medication Dose Route Frequency Provider Last Rate Last Admin    dextrose 10% infusion   Intravenous Continuous PRN Shaye Castillo APRN CNP        dialysate for CVVHD & CVVHDF (PrismaSol BGK 2/3.5)  12.5 mL/kg/hr CRRT Continuous Cday Jason MD 1,400 mL/hr at 06/16/24 0852 12.5 mL/kg/hr at 06/16/24 0852    EPINEPHrine (ADRENALIN) 16 mg in sodium chloride 0.9 % 250 mL infusion CENTRAL  0.01-0.3 mcg/kg/min (Dosing Weight) Intravenous Continuous Invasive, CardiologMD julia 6 mL/hr at 06/16/24 1100 0.06 mcg/kg/min at 06/16/24 1100    heparin (porcine) 100 unit/mL in 0.45% Sodium Chloride ANTICOAGULANT infusion  10-50 Units/kg/hr (Ideal) Other Continuous Shaye Castillo APRN CNP 5 mL/hr at 06/16/24 1200 500 Units/hr at 06/16/24 1200    heparin 2 unit/mL in 0.9% NaCl (for REPERFUSION CATHETER)  3 mL/hr Intravenous Continuous Aries Albert MD 3 mL/hr at 06/16/24 1100 3 mL/hr at 06/16/24 1100    No heparin required   Does not apply Continuous PRN Cady Jason MD        norepinephrine (LEVOPHED) 16 mg in  mL infusion MAX CONC CENTRAL LINE  0.01-0.6 mcg/kg/min (Dosing Weight) Intravenous Continuous Invasive, CardiologMD julia 8.1 mL/hr at 06/16/24 1100 0.08 mcg/kg/min at 06/16/24 1100    POST-filter replacement solution for CVVHD & CVVHDF (PrismaSol BGK 2/3.5)   CRRT Continuous Cady Jason  mL/hr at 06/15/24 1413 New Bag at 06/15/24 1413    PRE-filter replacement solution for CVVHD & CVVHDF (PrismaSol BGK 2/3.5)  12.5 mL/kg/hr CRRT Continuous Cady Jason MD  1,400 mL/hr at 06/16/24 0852 12.5 mL/kg/hr at 06/16/24 0852    vasopressin 1 unit/mL MAX Conc (PITRESSIN) infusion  0.5-4 Units/hr Intravenous Continuous Invasive, Cardiologist, MD 4 mL/hr at 06/16/24 1100 4 Units/hr at 06/16/24 1100

## 2024-06-17 NOTE — PROGRESS NOTES
Nephrology Progress Note  06/17/2024       Tyler Renteria is 64 yom w/ESRD on HD MWF, CABG 2001, redo CABG 2011, multiple PCIs of unknown targets, afib, DM2, EF 30-35% with PPM for CHB. Recently admitted to [facility] 5/21-5/31/24 for CHF exacerbation, hypotension, anemia and GIB. EGD 5/22/24 w/4 non-bleeding angioectasias and single AVM in duodenum treated with APC and clip. Colonoscopy on 5/24/24 w/multiple polyps and a large cecal polyp not amenable to removal. 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 Now s/p arrest on 6/12, on ECMO with IABP, Nephrology consulted for management of dialysis post arrest with lactic acidosis. CRRT started 6/15.    Interval History :   Mr Renteria continues on ECMO, unfortunately has very concerning neuro picture, plans for family conference tomorrow.  Will continue CRRT, will change to 4k baths as K is now running low, has large GI losses with GIB so ordered for I=O but mainly is achieving this despite setting UF to 0.        Assessment & Recommendations:   ESRD-Unknown details other than MWF until records are available.  Currently on ECMO, had planned to start CRRT but neuro picture is very poor and there is no urgent indication.  Will continue to follow.                 -ESRD, no need for new RRT consent, continuing long term RRT.                -Access is ECMO circuit.      -CRRT, changed to 4k baths, I=O but mainly achieving this independent of UF with large GI losses.      Volume status-+1-2 edema in BLE but hemodynamically unstable, ordered for I=O.          Electrolytes/pH-K 3.3, bicarb 23 with pH of 7.37 with lactic acidosis.      Ca/phos/pth-No acute issues.      Anemia-Hgb 8.0, acute management per team.      Nutrition-Novasource renal.     Time spent: 45 minutes on this date of encounter for chart review, physical exam, medical decision making and co-ordination of care.      Seen and discussed with   "Brandon    Recommendations were communicated to primary team via verbal communication.     CHRIS Curran CNS  Clinical Nurse Specialist  [phone]    Review of Systems:   I reviewed the following systems:  ROS not done due to vent/sedation    Physical Exam:   I/O last 3 completed shifts:  In: 3457.16 [I.V.:1220.16; NG/GT:220]  Out: 5116 [Emesis/NG output:50; Other:2866; Stool:2200]   /45   Pulse 81   Temp 98.1  F (36.7  C)   Resp 10   Ht 1.8 m (5' 10.87\")   Wt 110.3 kg (243 lb 2.7 oz)   SpO2 98%   BMI 34.04 kg/m       General: Intubated and sedated.   HEENT: PERRL, no scleral icterus or injection  CARDIAC: RRR, no m/r/g appreciated. Peripheral pulses dopplered  RESP: Mechanical ventilation; CTAB, no wheezes, rhonchi or crackles appreciated.  GI: soft, BS hypoactive  : Montesinos  EXTREMITIES: NO LE edema, pulses 2+. Femoral access site w/o bleeding, dressing c/d/i.   SKIN: No acute lesions appreciated  NEURO: Intubated and sedated    Labs:   All labs reviewed by me  Electrolytes/Renal -   Recent Labs   Lab Test 06/17/24  0813 06/17/24  0345 06/17/24  0344 06/16/24  2204 06/16/24  1631 06/16/24  1619 06/16/24  1217 06/16/24  1157   NA  --   --  138 136  136  --  139  --  138   POTASSIUM  --   --  3.3* 3.3*  3.3*  --  3.5  --  3.6   CHLORIDE  --   --  107 105  105  --  107  --  105   CO2  --   --  23 22  22  --  23  --  23   BUN  --   --  17.9 19.7  19.7  --  22.4  --  23.4*   CR  --   --  2.25* 2.51*  2.51*  --  2.85*  --  2.97*   * 121* 123* 118*  125*  125*   < > 121*  126*   < > 130*   NAVI  --   --  8.9 8.9  8.9  --  8.8  --  8.9   MAG  --   --  2.2 1.9  --  2.0  --  2.1   PHOS  --   --  3.0 3.4  --   --   --  3.4    < > = values in this interval not displayed.       CBC -   Recent Labs   Lab Test 06/17/24 0344 06/16/24 2204 06/16/24  1619   WBC 17.6* 16.9* 17.4*   HGB 8.0* 8.0* 7.0*   PLT 81* 101* 84*       LFTs -   Recent Labs   Lab Test 06/17/24 0344 06/16/24 2204 " 06/16/24  1619   ALKPHOS 55 55 55   BILITOTAL 2.0* 1.8* 1.5*   ALT 17 19 22   * 136* 137*   PROTTOTAL 3.9* 3.9* 3.9*   ALBUMIN 2.0* 2.0*  2.0* 2.0*       Iron Panel - No lab results found.        Current Medications:  Current Facility-Administered Medications   Medication Dose Route Frequency Provider Last Rate Last Admin    aspirin (ASA) chewable tablet 81 mg  81 mg Oral or Feeding Tube Daily Shaye Castillo APRN CNP   81 mg at 06/17/24 0844    B and C vitamin Complex with folic acid (NEPHRONEX) liquid 5 mL  5 mL Per Feeding Tube Daily Shaye Castillo APRN CNP   5 mL at 06/17/24 0844    chlorhexidine (PERIDEX) 0.12 % solution 15 mL  15 mL Mouth/Throat Q12H Aries Albert MD   15 mL at 06/17/24 0844    pantoprazole (PROTONIX) IV push injection 40 mg  40 mg Intravenous BID Hermila Saba APRN CNP   40 mg at 06/17/24 0844    thiamine (B-1) tablet 100 mg  100 mg Oral or Feeding Tube Daily Shaye Castillo APRN CNP   100 mg at 06/17/24 0844     Current Facility-Administered Medications   Medication Dose Route Frequency Provider Last Rate Last Admin    dextrose 10% infusion   Intravenous Continuous PRN Shaye Castillo APRN CNP        dialysate for CVVHD & CVVHDF (Phoxillum BK4/2.5)  12.5 mL/kg/hr CRRT Continuous Kelvin Duque APRN CNS        EPINEPHrine (ADRENALIN) 16 mg in sodium chloride 0.9 % 250 mL infusion CENTRAL  0.01-0.3 mcg/kg/min (Dosing Weight) Intravenous Continuous Invasive, Cardiologist, MD 10.1 mL/hr at 06/17/24 1000 0.1 mcg/kg/min at 06/17/24 1000    heparin (porcine) 100 unit/mL in 0.45% Sodium Chloride ANTICOAGULANT infusion  10-50 Units/kg/hr (Ideal) Other Continuous Shaye Castillo APRN CNP 6 mL/hr at 06/17/24 1000 600 Units/hr at 06/17/24 1000    heparin 2 unit/mL in 0.9% NaCl (for REPERFUSION CATHETER)  3 mL/hr Intravenous Continuous Aries Albert MD 3 mL/hr at 06/17/24 1000 3 mL/hr at 06/17/24 1000    No heparin required   Does not apply Continuous PRN Neto  CHRIS Villareal        norepinephrine (LEVOPHED) 16 mg in  mL infusion MAX CONC CENTRAL LINE  0.01-0.6 mcg/kg/min (Dosing Weight) Intravenous Continuous Invasive, CardiologMD julia 12.1 mL/hr at 06/17/24 1000 0.12 mcg/kg/min at 06/17/24 1000    POST-filter replacement solution for CVVHD & CVVHDF (Phoxillum BK4/2.5)   CRRT Continuous Kelvin Duque APRN CNS        PRE-filter replacement solution for CVVHD & CVVHDF (Phoxillum BK4/2.5)  12.5 mL/kg/hr CRRT Continuous Kelvin Duque APRN CNS        vasopressin 1 unit/mL MAX Conc (PITRESSIN) infusion  0.5-4 Units/hr Intravenous Continuous Invasive, CardiologMD julia 4 mL/hr at 06/17/24 1000 4 Units/hr at 06/17/24 1000

## 2024-06-17 NOTE — PROGRESS NOTES
"CRRT STATUS NOTE    DATA:  Time: 1500  Pressures WNL:  YES  Filter Status:  WDL  Problems Reported/Alarms Noted:  none  Supplies Present:  YES      ASSESSMENT:  Patient Net Fluid Balance:  Yesterday, Net -1.3L; Today thus far net even.     Vital Signs: On  VA ECMO, IABP, 3 pressors, vent. B/P: 89/42 (59) T: 98.1, P: 67, R: 10.     Labs:  k 3.3 on 2k baths this AM. Replaced x2 overnight. Changed to 4k.     Goals of Therapy: Reduced to I=O;  Meeting goals of therapy.       INTERVENTIONS:   - Changed from 2k to 4k baths today.   -To Head CT,  CRRT recirculated briefly and restarted.   - palliative spoke with family; plans for care conference soon.     PLAN:  Turndown v decannulation tomorrow ?   Continue CRRT while on ECMO and pressors.    Contact \"CRRT RN 2\" On Vocera with questions/concerns.     "

## 2024-06-17 NOTE — PLAN OF CARE
Major Shift Events:  No major events this shift. 2 PRBCs and 1 platelet replaced, per standing orders. Continues to have copious amount of blood/clot from rectal tube. LLE with increased cyanosis over NOC. Still able to doppler pulse to same.  Plan: Continue to monitor.  For vital signs and complete assessments, please see documentation flowsheets.

## 2024-06-17 NOTE — PROGRESS NOTES
[facility]    ECLS Shift Summary: 12D     ECMO Equipment:  Console Serial Number: [id number]  Circuit Lot Number: [id number]  Oxygenator Lot Number: [id number]    Circuit Assessment: Free of fibrin, clot, and air    Arterial ECMO Cannula: 17 Fr in the Right Femoral Artery  Venous ECMO Cannula: 25 Fr in the Right Femoral Vein  Distal Perfusion Catheter: 8 Fr in the Right Superficial Femoral Artery    ECMO Cannula Arterial Right femoral artery-Site Assessment: WDL, Sutured, Secure  ECMO Cannula Venous Right femoral vein-Site Assessment: WDL, Sutured, Secure  Distal Perfusion Catheter Right superficial femoral artery-Site Assessment: WDL, Sutured, Secure  ECMO Cannula Arterial Right femoral artery-Site Intervention: No intervention needed  ECMO Cannula Venous Right femoral vein-Site Intervention: No intervention needed  Distal Perfusion Catheter Right superficial femoral artery-Site Intervention: No intervention needed    Patient remains on V-A ECMO, all equipment is functioning and alarms are appropriately set. RPM's: 4000 with Blood Flow (Circuit) LPM  Av.9 LPM  Min: 4.54 LPM  Max: 5.03 LPM L/min. Sweep is at 0.5 LPM and 50 %. Extremities are cool and edematous.     Significant Shift Events: Pt taken to CT for head scan this afternoon.  Heparin turned off and flow increased to 5 lpm. Pt continues to show GI bleeding around rectal tube.  Awaiting both the arrival of all family members and the decision to move towards comfort care    Vent settings:  Vent Mode: CMV/AC  (Continuous Mandatory Ventilation/ Assist Control)  FiO2 (%): 40 %  Resp Rate (Set): 10 breaths/min  Tidal Volume (Set, mL): 450 mL  PEEP (cm H2O): (S) 5 cmH2O (Unknown who changed)  Resp: 10      Anticoagulation:  Dose (units/hr) HEParin: 0 Units/hr  Rate (mL/hr) HEParin: 0 mL/hr  Concentration HEParin: 100 Units/mL        Most recent: ACT  (seconds): 132 seconds    Urine output is NIL on CRRT.  .  Blood  loss was small. Product given included 1 RBC for low Hgb.     Intake/Output Summary (Last 24 hours) at 6/17/2024 1745  Last data filed at 6/17/2024 1700  Gross per 24 hour   Intake 3646.37 ml   Output 4303 ml   Net -656.63 ml       Labs:  Recent Labs   Lab 06/17/24  1546 06/17/24  1413 06/17/24  1121 06/17/24  1011 06/17/24  0810   PH 7.41 7.41  --  7.37 7.39   PCO2 39 40  --  44 42   PO2 107* 107*  --  75* 136*   HCO3 25 25  --  26 26   O2PER 50  40  40 40 50 40 40       Lab Results   Component Value Date    HGB 7.3 (L) 06/17/2024    PHGB <30 06/17/2024    PLT 87 (L) 06/17/2024    FIBR 220 06/17/2024    INR 1.43 (H) 06/17/2024    PTT 42 (H) 06/17/2024    DD 2.53 (H) 06/17/2024    ANTCH 41 (L) 06/17/2024       Plan is to continue VA ECMO support.    Abhay Aguilar, RT  ECMO Specialist  6/17/2024 5:45 PM

## 2024-06-17 NOTE — CONSULTS
Palliative Care Initial Social Work Note  Location: [facility]    Patient Info:  Per EMR, Tyler Renteria Jr. is a 64 year old male with past medical history of end-stage kidney disease on hemodialysis MWF, coronary artery disease (status post CABG 2001, redo CABG 2011, multiple PCIs of unknown targets), afib, type 2 diabetes, ischemic cardiomyopathy (LVEF 30-35% on 4/19/2024) and trifascicular block status post PPM. He was recently admitted to [facility] from 5/21/24 to 5/31/24 for CHF exacerbation, hypotension, anemia and GI bleed. Underwent EGD 5/22/24 which showed 4 small non-bleeding angioectasias which were treated with APC and a single AVM in duodenum was treated with APC and clip. Colonoscopy on 5/24/24 showed multiple polyps and a large cecal polyp not amenable to removal endoscopically. Biopsy of mass consistent with tubulovillous adenoma. It was discussed that he would need hemicolectomy for removal of that mass but likely wouldn't tolerate the procedure currently as he didn't tolerate colonoscopy.      On 06/12 patient was riding his motorized scooter outside of his TCU when he collapsed, CPR initiated. No-flow time around 5 minutes.  Received 16 shocks in total, rhythm was asystole and VT/VF, total downtime 1 hour.  Rhythm and Cath Lab was asystole. Underwent VA ECMO cannulation and placement of intra-aortic balloon pump.  Coronary angiogram showed  of the ostial circumflex with a patent vein graft to OM1 and OM 2, small to medium caliber LAD with patent LIMA to distal LAD graft, completely occluded large caliber diagonal vessel due to significant in-stent stenosis, diffusely diseased native RCA with extremely sluggish flow and a patent vein graft to RPDA. No intervention performed. Initial rhythm Asystole/VF.    Brief summary of visit: Introductory phone call with dtr Chica this morning to review role of palliative care in the setting of Tyler's ICU admission and ECMO  cannulation.    Introduced palliative care as an extra layer of support, helping patients and families dealing with chronic and/or serious illnesses.    Palliative care helps patients and families navigate their care while focusing on the whole person; providing emotional, social and spiritual support.   Palliative care social work can assist with adjustment to illness and coping, processing of information, emotional and decisional support needs, non-pharm techniques for stress and anxiety and can provide effective communication and caring support.    Chica continues to be sick with a respiratory illness her kid(s) brought home from school so has not been visiting at the hospital. She believes her uncle Eduardo and sister Kelli have been visiting as able.     Chica shared that as best the family can determine, her dad completed his dialysis run last Wednesday 6/12 around 1530, went to his room ([facility]), got on his motorized scooter and took himself outside to visit with friends from [facility] while waiting for his brother to visit. Per the report they received, he collapsed somewhere on the sidewalk outside of or down from [facility].    Chica shared she is eager to hear updates from her dad's team today. Specifically she is wondering if CRRT is offering any benefit and whether he is showing any signs of neuro activity or recovery. Notes indicate possible care conference in the coming day(s) which Chica would also welcome. Above shared with bedside RN via [employer] who will share with team.    Phone call kept somewhat brief today as talking triggered coughing for Chica.    Interventions used today: trust and rapport building, active and reflective listening, validation, emotional support    Date of Admission: 6/12/2024    Reason for consult: Patient and family support    Sources of information: Family member tereso Coto    Recommendations & Plan:  PCSW will continue to follow while palliative care team remains  involved; ongoing assessment of emotional and decisional support needs; participate in care conference if scheduled       Symptoms & Concerns Addressed Today:  Emotional support needs will continue to be assessed  Family    Strengths Identified:    Family support    Relationships & Support:  Aspects of relationships and support assessed today:  Identified family members: dtjannet Coto and Kelli, brother Eduardo  Professional supports: NA  Family coping: continue to assess  Bereavement Risk concerns: continue to assess; grandchildren?    Coping, Mental Health & Adjustment to Illness:   Adjustment to Illness/Hospitalization    Goals, Decision Making & Advance Care Planning:   Prognosis, Goals, and/or Advance Care Planning were assessed today: No  If yes, brief summary of discussion: not discussed by PCSW, team will update family  Preferred language: English  Patient's decision making preferences: not assessed  I have concerns about the patient/family's health literacy today: No  Patient has a completed Health Care Directive: No.   Code status per chart review: Full Code      Clinical Social Work Interventions:   Assessment of palliative specific issues    Introduction of Palliative clinical social work interventions   Adjustment to illness counseling  Facilitation of processing of thoughts/feelings  Re-framing      JORGE A Quevedo, [facility]  [facility], Palliative Care  Securely message with the Vocera Web Console (learn more here) or  Text page via [employer] Paging/Directory

## 2024-06-17 NOTE — PROGRESS NOTES
Cardiology ICU Progress Note    Brief HPI:  Tyler Renteria is 65 YO male with past medical history of end-stage kidney disease on hemodialysis MWF, coronary artery disease (status post CABG 2001, redo CABG 2011, multiple PCIs of unknown targets), afib, type 2 diabetes, ischemic cardiomyopathy (LVEF 30-35% on 4/19/2024) and trifascicular block status post PPM. He was recently admitted to [facility] from 5/21/24 to 5/31/24 for CHF exacerbation, hypotension, anemia and GI bleed. Underwent EGD 5/22/24 which showed 4 small non-bleeding angioectasias which were treated with APC and a single AVM in duodenum was treated with APC and clip. Colonoscopy on 5/24/24 showed multiple polyps and a large cecal polyp not amenable to removal endoscopically. Biopsy of mass consistent with tubulovillous adenoma. It was discussed that he would need hemicolectomy for removal of that mass but likely wouldn't tolerate the procedure currently as he didn't tolerate colonoscopy.     On 06/12 patient was [place] scooter on the freeway when he collapsed, CPR initiated. No-flow time around 5 minutes.  Received 16 shocks in total, rhythm was asystole and VT/VF, total downtime 1 hour.  Rhythm and Cath Lab was asystole. Underwent VA ECMO cannulation and placement of intra-aortic balloon pump.  Coronary angiogram showed  of the ostial circumflex with a patent vein graft to OM1 and OM 2, small to medium caliber LAD with patent LIMA to distal LAD graft, completely occluded large caliber diagonal vessel due to significant in-stent stenosis, diffusely diseased native RCA with extremely sluggish flow and a patent vein graft to RPDA. No intervention performed. Initial rhythm Asystole/VF.    Subjective and Interval:  - requiring blood products overnight [8 unit(s)], increasing pressor needs, no cranial nerves persist    Assessment and plan by system:   Today's changes:  - CTA head and neck  - titrate pressors as able  - decrease PEEP  - stop  heparin  - give blood as indicated for hgb > 8 g/dL  - start zosyn x 5 days  - talk with family     Neurology   # Concern for anoxic brain injury    # Chronic appearing old infarct  - Intubated, sedated. Avoiding hyperthermia  - Neuro-crit consulted and appreciate recommendations.   - vEEG 6/14: Severely abnormal. No clear electrocerebral activity for long portions of the recording, even at high sensitivity. No reactivity. Findings consistent with profound diffuse encephalopathy. No seizures.   - Trend S100 B and Neuron specific enolase   - Palliative care consulted.     Pupils 6mm, non reactive, NPI 0, no gag or withdrawal, no longer over breathing vent, not sedation.     CT head reveals Chronic appearing old infarct in the right middle frontal gyrus, gliosis of the underlying subcortical white matter. Correlation with prior imaging would be helpful if available, Gray-white matter differentiation is mostly preserved apart from the aforementioned chronic finding within the limits of the exam. However virtual nonenhanced CT shows diffuse loss of gray-white matter differentiation loss throughout the parenchyma. This could be artifactual. Recommend follow-up CT in 12-24 hours.    US Carotid less than 50% bilaterally     Current sedation:  RASS -2 to -3 once wakeful  None    Plan:  - Avoid sedation  - Spontaneous awakening trial daily as tolerated  - Permissive hypercapnea and hypernatremia for neuroprotection     Cardiovascular  # VF Cardiac arrest   # Cardiogenic shock   # CAD  # Cardiomyopathy  # Dyslipidemia  # CAD s/p CABG 2001, redo CABG 2011, multiple PCIs  # ICM - LVEF 30-35% on 4/19/2024  TTE: EF 10-15%, RV severely reduced, aortic valve opening  EKG: ECG Rhythm: Ventricular paced rhythm  Angiogram:   Ost Cx to Dist Cx lesion is 100% stenosed.    1st Diag lesion is 100% stenosed.    2nd Mrg lesion is 100% stenosed.    Central Venous Catheter  was placed. Ultrasound was used to visualize the left femoral vein.  Placement was successful.    Arterial Line was placed. Ultrasound was used to visualize the right radial artery right radial artery. Placement was successful.     VT/V-fib arrest.  Successful ECMO cannulation with 17 [place] right common femoral arterial and 25 [place] right femoral venous cannulas.  Successful intra-aortic balloon pump placement via 9 [place] left common femoral arterial sheath.  Successful placement of triple-lumen central venous catheter in the left femoral vein.  Successful placement of right radial arterial line.      Severe multivessel coronary disease status post coronary bypass grafting.  Ostial circumflex is completely occluded with patent vein graft to OM1 and OM 2.  Small to medium caliber LAD with patent LIMA to distal LAD graft. Distal LAD supplies very small territory.  Completely occluded large caliber diagonal vessel due to significant in-stent restenosis.  Native RCA is diffusely disease with extremely sluggish flow.  Patent vein graft to RPDA.     ECMO :  Mode: V-A   Pump Type: Cardiohelp  RPM's: 3700  Blood Flow (Circuit) LPM: 4.54 LPM  Sweep LPM: 0.5 LPM  Sweep FiO2   %: 40 %  Venous Pressure  mmHg: -56 mmHg  Arterial Pressure  mmH mmHg  Internal Pressure mmH mmHg  Pressure Change mmH mmHg    Dose (mcg/kg/min) Norepinephrine: 0.07 mcg/kg/min, Dose (units/hr) Vasopressin: 3 Units/hr, and Dose (mcg/kg/min) Epinephrine: 0.05 mcg/kg/min    Hemodynamics:  Art Line  Arterial Line BP: 92/38  Arterial Line MAP (mmHg): 59 mmHg  Arterial Line Location: Right radial  Art Line Wave Form: Appropriate wave forms    Plan:  - ASA  - Hold lipitor for now given shock liver  - Hold ACE/ARB for now given likely reduced renal fxn after arrest  - Holding beta blocker given shock  - Telemetry monitoring  - Trending troponin      Pulmonary  # Acute hypoxemic respiratory failure requiring intubation  # Probable aspiration pneumonia  # Pulmonary edema    Vent Settings:   Vent Mode: CMV/AC   (Continuous Mandatory Ventilation/ Assist Control)  FiO2 (%): 40 %  Resp Rate (Set): 10 breaths/min  Tidal Volume (Set, mL): 450 mL  PEEP (cm H2O): 7 cmH2O  Resp: 10    ABG:   Recent Labs   Lab 24  1011 24  0810 24  0559   PH 7.37 7.39 7.39   PCO2 44 42 43   PO2 75* 136* 101   HCO3 26 26 26   O2PER 40 40 40     CXR: stable support devices, worsening bibasilar edema     Plan:  - Wean vent as able  - Daily CXR  - Serial ABGs   - Consider scheduled duonebs if signs of lung dz, currently PRN    - Peridex        Gastrointestinal, Nutrition  # Concern for Shock liver 2/2 cardiac arrest  # Hypoalbuminemia   # Upper and lower GI bleed   No known medical hx.     Recent Labs   Lab 24  0344 24  1619   * 136* 137*   ALT 17 19 22   BILITOTAL 2.0* 1.8* 1.5*   ALBUMIN 2.0* 2.0*  2.0* 2.0*   PROTTOTAL 3.9* 3.9* 3.9*   ALKPHOS 55 55 55     Diet: TF    Plan:  - Monitor LFTs  - Bowel regimen in place  - GI Prophylaxis: PPI; Protonix 40 mg daily     Renal, Electrolytes  # Lactic acidosis  # ESRD on HD MWF     I/O last 3 completed shifts:  In: 3457.16 [I.V.:1220.16; NG/GT:220]  Out: 5116 [Emesis/NG output:50; Other:2866; Stool:2200]   Recent Labs   Lab 24  0344 24  2204 24  1619 24  1157    136  136 139 138   POTASSIUM 3.3* 3.3*  3.3* 3.5 3.6   CHLORIDE 107 105  105 107 105   CO2 23 22  22 23 23   BUN 17.9 19.7  19.7 22.4 23.4*   CR 2.25* 2.51*  2.51* 2.85* 2.97*   PHOS 3.0 3.4  --  3.4   MAG 2.2 1.9 2.0 2.1     NG/OG/NJ Tube Orogastric Center mouth-Flush/Free Water (mL): 30 mL    Plan:  - Avoid nephrotoxins, renally dose meds  - Monitor urine output    Infectious Disease  # Aspiration Pneumonia- in the setting of arrest.  CXR/CAP as above.   # Leukocytosis  Recent Labs   Lab 24  1011 24  0344 24  2204   WBC 16.9* 17.6* 16.9*     Temp (24hrs), Av.6  F (37  C), Min:97.3  F (36.3  C), Max:98.8  F (37.1  C)       Cultures thus  far:   6/15 sputum  Non lactose fermenting gram negative bacilli   Antibiotics     Anti-infectives (From now, onward)      None                        Plan:  - Continue Ceftriaxone x 7D for aspiration coverage  -  Monitor for signs of infection  -  Avoid fevers    Hematology  # Anemia of critical illness  # Tubulovillous adenoma of the cecum   # acute on chronic GI bleed  Recent Labs   Lab 06/17/24  1011 06/17/24  0344 06/16/24  2204   HGB 8.2* 8.0* 8.0*   HCT 24.6* 24.0* 23.9*   * 81* 101*     Recent Labs   Lab 06/17/24  1011 06/17/24  0344 06/16/24  2204   INR 1.41* 1.38* 1.43*   PTT 47* 43* 45*     Patient profusely bleeding, multiple units last 24hrs    DVT PPX: Heparin infusion straight rate    Plan:  - CANDACE for DVT ppx  - Transfusion parameters:   - 1u PRBC for hbg < 8   - 1u platelet for plt < 50   - 1u FFP for INR > 3   - 5u cryoprecipitate for fibrinogen <150     Endocrinology  # Hyperglycemia   - No known medical history.   Hgb a1c   Recent Labs   Lab 06/12/24  1906   A1C 4.8     Recent Labs   Lab 06/17/24  1015 06/17/24  1011 06/17/24  0813 06/17/24  0345 06/17/24  0344   * 119* 126* 121* 123*       Plan  - insulin infusion as needed    Integumentary:  Multiple bruising, weeping from sores on LE    Lines/Tubes/Drains:  Arterial Line 06/12/24 Radial (Active)   Site Assessment WDL 06/17/24 0800   Line Status Pulsatile blood flow 06/17/24 0800   Arterine Line Cap Change Due 06/16/24 06/16/24 0800   Art Line Waveform Appropriate 06/17/24 0800   Art Line Interventions Leveled;Connections checked and tightened;Flushed per protocol 06/17/24 0800   Color/Movement/Sensation Cool fingers/toes;Capillary refill greater than 3 sec 06/17/24 0800   Line Necessity Yes, meets criteria 06/17/24 0800   Dressing Type Transparent 06/17/24 0800   Dressing Status Clean, dry, intact 06/17/24 0800   Dressing Change Due 06/19/24 06/17/24 0800   Number of days: 5       Arterial Sheath  06/12/24 9 Fr Femoral (Active)    Specific Qualities Sutured;Stat Locked 06/17/24 0800   Site Assessment WDL 06/17/24 0800   Dressing Type Securing device;Sutured 06/17/24 0800   Dressing Intervention Dressing changed/new dressing 06/14/24 0000   Arterial Sheath Comment IABP 06/17/24 0800   Number of days: 5       CVC Triple Lumen Left Femoral Non - valved (open ended);Non - tunneled (Active)   Site Assessment WDL 06/17/24 0800   Dressing Chlorhexidine disk;Transparent 06/17/24 0800   Dressing Status clean;dry;intact 06/17/24 0800   Dressing Intervention dressing changed 06/14/24 0000   Dressing Change Due 06/21/24 06/17/24 0800   Line Necessity Yes, meets criteria 06/17/24 0800   Blue - Status saline locked 06/17/24 0800   Blue - Cap Change Due 06/20/24 06/17/24 0800   Blue - Intervention Tubing change;Cap change 06/16/24 1600   Brown - Status infusing 06/17/24 0800   Brown - Cap Change Due 06/20/24 06/17/24 0800   Brown - Intervention Tubing change;Cap change 06/16/24 1600   White - Status infusing 06/17/24 0800   White - Cap Change Due 06/20/24 06/17/24 0800   White - Intervention Tubing change;Cap change 06/16/24 1600   Phlebitis Scale 0-->no symptoms 06/17/24 0800   Infiltration? no 06/17/24 0800   CVC Comment CDI 06/14/24 0400   Number of days:        ECMO Cannula Arterial Right femoral artery (Active)   Site Assessment WDL;Sutured;Secure 06/17/24 1000   Skin Assessment Dry;Intact 06/17/24 1000   Dressing Type Silverlon;Ioban 06/17/24 1000   Dressing Status intact;dry;old drainage 06/17/24 1000   Site Intervention No intervention needed 06/17/24 1000   Number of days: 5       ECMO Cannula Venous Right femoral vein (Active)   Site Assessment WDL;Sutured;Secure 06/17/24 1000   Skin Assessment Dry;Intact 06/17/24 1000   Skin Intervention Foam dressing;Protective barrier applied 06/17/24 1000   Dressing Type Ioban;Silverlon 06/17/24 1000   Dressing Status dry;intact;old drainage 06/17/24 1000   Site Intervention No intervention needed 06/17/24  1000   Number of days: 5       Distal Perfusion Catheter Right superficial femoral artery (Active)   Site Assessment WDL;Sutured;Secure 06/17/24 1000   Dressing Type Silverlon;Ioban 06/17/24 1000   Dressing Status dry;intact;old drainage 06/17/24 1000   Site Intervention No intervention needed 06/17/24 1000   Number of days: 5       Hemodialysis Vascular Access AV fistula Left Forearm (Active)   Site Assessment UTV 06/17/24 0800   Dressing Intervention Other (Comment) 06/13/24 0400   Dressing Status Intact 06/17/24 0800   Number of days:        ETT Cuffed 8 mm (Active)   Secured at (cm) 24 cm 06/17/24 0850   Measured from Lips 06/17/24 0850   Position Center 06/17/24 1000   Secured by Commercial tube cardenas 06/17/24 0850   Bite Block None Present 06/17/24 0850   Site Appearance Clean;Dry 06/17/24 0850   Tube Care Inline suction catheter changed 06/17/24 0800   Cuff Assessment Minimal leak technique 06/17/24 0850   Safety Measures Manual resuscitator at bedside;Manual resuscitator/mask/valve in room;Manual resuscitator/PEEP valve in room 06/17/24 0850   Number of days: 5       NG/OG/NJ Tube Orogastric Center mouth (Active)   Site Description WDL 06/17/24 0800   Status Enteral Feedings 06/17/24 0800   Drainage Appearance Bile;Green 06/16/24 1200   Placement Confirmation [place] unchanged 06/17/24 0800   [place] (cm marking) at nare/mouth 67 cm 06/17/24 0800   Intake (ml) 40 ml 06/16/24 0800   Flush/Free Water (mL) 30 mL 06/17/24 0800   Container Amount 150 mL 06/16/24 1200   Output (ml) 50 ml 06/16/24 1200   Number of days: 5       Rectal Tube With balloon (Active)   Balloon fill volume  20 cc 06/17/24 0800   Stool Leakage Medium 06/17/24 0800   Rectal Tube Container Volume 200 ml 06/17/24 0800   Rectal Tube Output 200 ml 06/17/24 0800   Number of days: 5       Wound Arm (Active)   Wound Bed Other (Comment) 06/16/24 0800   Renée-wound Assessment Ecchymosis 06/17/24 0800   Drainage Amount UTV 06/17/24 0800   Drainage  Color/Characteristics UTV 06/17/24 0800   Wound Care/Cleansing Wound cleanser 06/15/24 0800   Dressing Foam 06/17/24 0800   Dressing Status Clean, dry, intact 06/17/24 0800   Number of days: 5       Wound Sternum Friction injury;Abrasion(s) (Active)   Wound Bed Erythema, non-blanchable, skin intact 06/17/24 0800   Renée-wound Assessment Erythema 06/17/24 0800   Drainage Amount None 06/17/24 0800   Wound Care/Cleansing Wound cleanser 06/13/24 2000   Dressing Open to air 06/17/24 0800   Number of days: 5       Wound Leg Abrasion(s) (Active)   Wound Bed Other (Comment) 06/17/24 0800   Renée-wound Assessment Edema 06/17/24 0800   Estimated Circumference ( if not measured tennis ball sized 06/17/24 0000   Drainage Amount UTV 06/17/24 0800   Dressing Foam 06/17/24 0800   Dressing Status Clean, dry, intact 06/17/24 0800   Dressing Change Due 06/19/24 06/17/24 0000   Number of days:        Wound Hand Skin tear (Active)   Wound Bed Other (Comment) 06/17/24 0800   Renée-wound Assessment Edema;Erythema;Ecchymosis 06/17/24 0800   Drainage Amount UTV 06/17/24 0800   Drainage Color/Characteristics Serosanguineous 06/17/24 0000   Wound Care/Cleansing Wound cleanser 06/17/24 0000   Dressing Foam 06/17/24 0800   Dressing Status Clean, dry, intact 06/17/24 0800   Dressing Change Due 06/19/24 06/17/24 0000   Number of days:       ICU  Feeding: Start feeding  Analgesia:  None  Sedation: none  Thrombopx: Heparin infusion  Head of bed: reverse trend   Ulcers: PPI  Glucose: Insulin infusion if needed  SBT: unable  Bowel reg: GIB, not indicated  Indwelling cath: need for I/O's  De-escalate meds: none  Medically Ready for Discharge: Anticipated in 5+ Days     Family will be updated by team    Patient seen and discussed with staff physician Dr. Toney.    Patient seen and note prepared with NP student Jaqui Coyne.     Rashaad Whiteside APRN, CNP  Critical Care Cardiology  Securely message with the Vocera Web Console (learn more here)  "    Objective:  Most recent vital signs:  /45   Pulse 81   Temp 98.1  F (36.7  C)   Resp 10   Ht 1.8 m (5' 10.87\")   Wt 110.3 kg (243 lb 2.7 oz)   SpO2 98%   BMI 34.04 kg/m    Temp:  [97.3  F (36.3  C)-98.6  F (37  C)] 98.1  F (36.7  C)  Pulse:  [65-91] 81  Resp:  [10] 10  MAP:  [54 mmHg-243 mmHg] 59 mmHg  Arterial Line BP: ()/(37-64) 92/38  FiO2 (%):  [40 %] 40 %  SpO2:  [83 %-100 %] 98 %    Physical exam:  General: critically-ill male, in NAD, VA ECMO, mechanically ventilated   HEENT: pupils non-reactive by pupillometry, ETT present obstructing airway exam  CARDIAC: RRR, IABP precludes exam. Peripheral pulses dopplered  RESP: Mechanical ventilation; diminished at bases, ECMO circuit precludes exam.  GI: soft, BS hyperactive.   : Indwelling catheter present.   EXTREMITIES: +3 L +2 R edema, pulses dopplerable x 4. Femoral access site w/o bleeding, dressing C/D/I.   SKIN: No acute lesions appreciated  NEURO: Intubated, not sedated, no cranial nerves present. No cough, no gag, no pupils, no overbreathing ventilator.    Imaging/procedure results:  XR Chest Port 1 View  Narrative: Exam: XR CHEST PORT 1 VIEW, 6/17/2024 1:22 AM    Comparison: 6/16/2024    History: Check endotracheal tube placement and ECLS cannula placement.  DO NOT log-roll patient.  Place film under patient using patient  safety handling process.    Findings:  Portable AP view of the chest. Stable endotracheal tube and inferior  approach ECMO cannula. Right chest wall pacemaker. Intra-aortic  balloon pump stable position with superior marker 1.2 cm above the  mathieu. Median sternotomy wires. Stable enlarged cardiac silhouette.  Left basilar and retrocardiac opacity. No pneumothorax. Minimal right  basilar opacities.   Impression: Impression: Stable support devices. Slightly increased bibasilar  opacities, greater on the left, likely atelectasis and edema.     I have personally reviewed the examination and initial interpretation  and " I agree with the findings.    DANIEL MOY MD         SYSTEM ID:  [id number]     Recent Results (from the past 24 hour(s))   CT Head w/o Contrast    Narrative    EXAM: Head CT without contrast 6/16/2024 2:31 PM    HISTORY: Continued loss of brain stem reflexes, interval changes.    COMPARISON: Head CT 6/14/2024, 6/12/2024.    TECHNIQUE: Using multidetector thin collimation helical acquisition  technique, axial, coronal and sagittal CT images from the skull base  to the vertex were obtained without intravenous contrast.   (topogram) image(s) also obtained and reviewed.    FINDINGS:  There is no intracranial hemorrhage, mass effect, midline shift, or  extra-axial fluid collection. Diffuse bilateral and symmetric loss of  gray-white matter differentiation in the cerebral hemispheres.  Ill-defined area of parenchymal hypodensity in the right frontal lobe  (series 3 image 30) appear similar to prior. Ventricles are  proportionate to the cerebral sulci. New minimal effacement of the  basal cisterns. No herniation. Abnormal hypoattenuation in the  brainstem, bilateral basal ganglia, and bilateral thalami similar to  CT 6/14/2024. Hypoattenuation is more conspicuous in the jaylin compared  to the prior study. Small incidental posterior fossa arachnoid cyst.  Vascular calcifications.    The bony calvaria and the bones of the skull base are normal. The  orbits are intact. Layering fluid in the right maxillary sinus and  sphenoid sinus. Mild lateral mastoid effusions.      Impression    IMPRESSION:   Slight progression of sequelae from diffuse anoxic-hypoxic brain  injury. No herniation.    I have personally reviewed the examination and initial interpretation  and I agree with the findings.    KEVON GOLDEN MD         SYSTEM ID:  [id number]   XR Chest Port 1 View    Narrative    Exam: XR CHEST PORT 1 VIEW, 6/17/2024 1:22 AM    Comparison: 6/16/2024    History: Check endotracheal tube placement and ECLS cannula  placement.  DO NOT log-roll patient.  Place film under patient using patient  safety handling process.    Findings:  Portable AP view of the chest. Stable endotracheal tube and inferior  approach ECMO cannula. Right chest wall pacemaker. Intra-aortic  balloon pump stable position with superior marker 1.2 cm above the  mathieu. Median sternotomy wires. Stable enlarged cardiac silhouette.  Left basilar and retrocardiac opacity. No pneumothorax. Minimal right  basilar opacities.       Impression    Impression: Stable support devices. Slightly increased bibasilar  opacities, greater on the left, likely atelectasis and edema.     I have personally reviewed the examination and initial interpretation  and I agree with the findings.    DANIEL MOY MD         SYSTEM ID:  [id number]

## 2024-06-17 NOTE — PROGRESS NOTES
CR[facility] STATUS NOTE    DATA:  Time:  0518  Pressures WNL:  YES  Filter Status:  WDL    Problems Reported/Alarms Noted:  none    Supplies Present:  YES    ASSESSMENT:    Patient Net Fluid Balance:  +62ml net since midnight       Intake/Output Summary (Last 24 hours) at 6/17/2024 0518  Last data filed at 6/17/2024 0400  Gross per 24 hour   Intake 3491.76 ml   Output 4320 ml   Net -828.24 ml       Vital Signs: Temp:  [97.3  F (36.3  C)-98.6  F (37  C)] 98.2  F (36.8  C)  Pulse:  [60-85] 68  Resp:  [10] 10  MAP:  [54 mmHg-243 mmHg] 72 mmHg  Arterial Line BP: ()/(37-60) 102/55  FiO2 (%):  [40 %] 40 %  SpO2:  [83 %-100 %] 95 %       Most Recent BMP's:  Recent Labs   Lab Test 06/17/24 0344 06/16/24 2204 06/16/24  1619 06/16/24  1157 06/16/24  1006 06/16/24  0403    136  136 139 138 138 141   POTASSIUM 3.3* 3.3*  3.3* 3.5 3.6 3.6 3.8   CHLORIDE 107 105  105 107 105 107 107   CO2 23 22  22 23 23 22 22   BUN 17.9 19.7  19.7 22.4 23.4* 24.6* 28.2*   CR 2.25* 2.51*  2.51* 2.85* 2.97* 3.11* 3.64*   ANIONGAP 8 9  9 9 10 9 12   NAVI 8.9 8.9  8.9 8.8 8.9 8.8 8.8     Most Recent CBC's:  Recent Labs   Lab Test 06/17/24 0344 06/16/24 2204 06/16/24  1619 06/16/24  1157   WBC 17.6* 16.9* 17.4* 17.0*   HGB 8.0* 8.0* 7.0* 7.6*   MCV 91 90 91 90   PLT 81* 101* 84* 97*     Most Recent ABG's:  Recent Labs   Lab Test 06/17/24 0345 06/17/24 0344 06/17/24  0000 06/16/24 2204   PH 7.42  --  7.39 7.40   PO2 138*  --  119* 110*   PCO2 39  --  42 41   HCO3 25  --  25 25   FORTINO 0.6 0.3 0.1 0.3       Goals of Therapy:  0-50ml/hr    INTERVENTIONS:   No interventions overnight. Circuit ran without alarms or problems.     Charting reviewed. Rounding completed. Treatment plan discussed with bedside nurse.     PLAN:    PRN potassium replacement is being used on 2K bath. May require a change to 4K bath.     Continue with current plan of care please contact [facility] resource with any questions or concerns via [employer].

## 2024-06-17 NOTE — PROGRESS NOTES
ECMO Attending Progress Note  2024    Ismael Quincy Hodgson is a 89 year old male who was cannulated for ECMO 2024 due to refractory VF arrest    Cannulation Site:  17 Fr in the R femoral artery  25 Fr in the R femoral vein    Interval events: ongoing bloody stool - bright red with clots at 100-200/hr    Pulsatilty (IABP paused if applicable): 20 mmHG     Physical Exam:  Temp:  [97.5  F (36.4  C)-98.4  F (36.9  C)] 98.4  F (36.9  C)  Pulse:  [65-91] 80  Resp:  [10] 10  MAP:  [54 mmHg-87 mmHg] 58 mmHg  Arterial Line BP: ()/(34-64) 89/37  FiO2 (%):  [40 %] 40 %  SpO2:  [91 %-100 %] 98 %    Intake/Output Summary (Last 24 hours) at 2024 1240  Last data filed at 2024 1200  Gross per 24 hour   Intake 2100.77 ml   Output 2618 ml   Net -517.23 ml      Vent Mode: CMV/AC  (Continuous Mandatory Ventilation/ Assist Control)  FiO2 (%): 40 %  Resp Rate (Set): 10 breaths/min  Tidal Volume (Set, mL): 450 mL  PEEP (cm H2O): (S) 5 cmH2O (Unknown who changed)  Resp: 10       Labs:  Recent Labs   Lab 24  1546 24  1413 24  1121 24  1011 24  0810   PH 7.41 7.41  --  7.37 7.39   PCO2 39 40  --  44 42   PO2 107* 107*  --  75* 136*   HCO3 25 25  --  26 26   O2PER 50  40  40 40 50 40 40      Recent Labs   Lab 24  1546 24  1011 24  0344 24  2204   WBC 18.3* 16.9* 17.6* 16.9*   HGB 7.3* 8.2* 8.0* 8.0*     Creatinine   Date Value Ref Range Status   2024 2.00 (H) 0.67 - 1.17 mg/dL Final   2024 2.03 (H) 0.67 - 1.17 mg/dL Final   2024 2.04 (H) 0.67 - 1.17 mg/dL Final   2024 2.25 (H) 0.67 - 1.17 mg/dL Final   Blood Flow (Circuit) LPM: 4.56 LPM  Sweep LPM: 1 LPM  Sweep FiO2   %: 40 %  ACT  (seconds): 148 seconds  Pulse Oximetry  (SpO2%): 100 %  Arterial Pressure  mmH mmHg    ECMO Issues including assessments and plan on DOS 2024:  Neuro:  No acute distress.  NIRS stable b/l  RASS goal: -1  CV: Cardiogenic shock.  Rising pressor  needs  Pulm: Keep vent settings at rest settings as above.  FEN/Renal: CRRT  Heme: Holding AC for GIB, multiple (6 prbcs in 24 hrs) Minimal oozing around the ECMO cannulas.  ID: Receiving empiric antibiotics  Cannulae: Position is acceptable on exam and the available imaging.  Distal perfusion cannula is in place and patent.  Extremities are well-perfused.     I have personally reviewed the ECMO flows, oxygenation and CO2 clearance, anticoagulation, and cannula position.  I have also personally assessed the patient's systemic response with hemodynamics, oxygenation, ventilation, and bleeding.       The patient requires continued ECMO support and management in the ICU.  I have discussed patient care and treatment plan with the primary team.      Subhash Toney MD  Critical Care Cardiology    June 17, 2024

## 2024-06-17 NOTE — PROGRESS NOTES
[facility]    ECLS Shift Summary:     ECMO Equipment:  Console Serial Number: [id number]  Circuit Lot Number: [id number]  Oxygenator Lot Number: [id number]    Circuit Assessment: Free of fibrin, clot, and air    Arterial ECMO Cannula: 17 Fr in the Right Femoral Artery  Venous ECMO Cannula: 25 Fr in the Right Femoral Vein  Distal Perfusion Catheter: 8 Fr in the Right Superficial Femoral Artery    ECMO Cannula Arterial Right femoral artery-Site Assessment: WDL, Sutured, Secure  ECMO Cannula Venous Right femoral vein-Site Assessment: WDL, Sutured, Secure  Distal Perfusion Catheter Right superficial femoral artery-Site Assessment: WDL, Sutured, Secure  ECMO Cannula Arterial Right femoral artery-Site Intervention: No intervention needed  ECMO Cannula Venous Right femoral vein-Site Intervention: No intervention needed  Distal Perfusion Catheter Right superficial femoral artery-Site Intervention: No intervention needed    Patient remains on V-A ECMO, all equipment is functioning and alarms are appropriately set. RPM's: 3700 with Blood Flow (Circuit) LPM  Av.5 LPM  Min: 4.48 LPM  Max: 4.49 LPM L/min. Sweep is at 0.5 LPM and (S) 40 %. Extremities are cool, swollen, weeping, with pedal pulses.     Significant Shift Events: Throughout the shift two episodes of j carlos red blood leakage from around rectal tube. No issues with circuit chugging or clotting.     Vent settings:  Vent Mode: CMV/AC  (Continuous Mandatory Ventilation/ Assist Control)  FiO2 (%): 40 %  Resp Rate (Set): 10 breaths/min  Tidal Volume (Set, mL): 450 mL  PEEP (cm H2O): 7 cmH2O  Resp: 10      Anticoagulation:  Dose (units/hr) HEParin: 500 Units/hr  Rate (mL/hr) HEParin: 5 mL/hr  Concentration HEParin: 100 Units/mL        Most recent: ACT  (seconds): 144 seconds    Urine output is 0 mL/hr, CRRT.  .  Blood loss was moderate via rectal tube leakage. Product given included PRBC x2 units, Plts x1 unit.      Intake/Output Summary (Last 24 hours) at 6/17/2024 0621  Last data filed at 6/17/2024 0557  Gross per 24 hour   Intake 3620.76 ml   Output 4240 ml   Net -619.24 ml       Labs:  Recent Labs   Lab 06/17/24  0559 06/17/24  0345 06/17/24  0344 06/17/24  0000 06/16/24  2204   PH 7.39 7.42  --  7.39 7.40   PCO2 43 39  --  42 41   PO2 101 138*  --  119* 110*   HCO3 26 25  --  25 25   O2PER 40 40 50  40 40 40       Lab Results   Component Value Date    HGB 8.0 (L) 06/17/2024    PHGB <30 06/17/2024    PLT 81 (L) 06/17/2024    FIBR 241 06/17/2024    INR 1.38 (H) 06/17/2024    PTT 43 (H) 06/17/2024    DD 2.73 (H) 06/17/2024    ANTCH 42 (L) 06/16/2024       Plan is for continued [facility] cares, possible turndown.    Cal Jung, JHOAN  ECMO Specialist  6/17/2024 6:21 AM

## 2024-06-18 LAB
BACTERIA BLD CULT: NO GROWTH
BACTERIA BLD CULT: NO GROWTH

## 2024-06-18 NOTE — PROGRESS NOTES
Death Note  Called to pronounce Tyler Dexter Marrufo dead.     Physical Exam: Unresponsive to noxious stimuli. Spontaneous respirations absent. Breath sounds absent. Carotid pulse absent. Heart sounds absent. Pupillary light reflex and corneal blink reflex absent.   Patient was pronounced dead at 10:23pm    Joshua Martinez MD  Cardiology Fellow

## 2024-06-18 NOTE — PLAN OF CARE
Major Shift Events:  Plan made by day team with family to withdraw care. Family at bedside (daughters and granddaughter), decided to withdraw care around . Night Provider, Dr Martinez notified. Orders placed for comfort care/DNR/DNI. RN gave 100 mcg IV fentanyl, 2 mg IV versed and 0.2mg IV robinul for comfort prior to extubation. Patient extubated at , CRRT stopped at , IABP stopped at , ECMO clamped out and drips stopped at . Provider at bedside to pronounce, time of death .     Family took home patient belongings, except for family pictures, patient blanket, patient patricia-shirt and dog plushie. Per family, these belongings to remain with patient until  home.     Family unsure of  home choice. RN gave family phone number for security with instructions to call once  home is known. Chica (daughter) aware of process.     [facility] notified prior to extubation/withdrawal of care. Patient not a candidate for organ donation. [facility] notified after patient death, not a candidate for tissue donation, awaiting answer to eye donation. Referral number: [id number]    For vital signs and complete assessments, please see documentation flowsheets.

## 2024-06-18 NOTE — PROGRESS NOTES
[facility]    ECLS Discontinuation Note:     ECLS was discontinued 6/17/2024 at 2213. Additionally, IABP was discontinued at 2213.    Cal Jung RN  ECMO Specialist  6/17/2024 10:24 PM

## 2024-06-18 NOTE — DISCHARGE SUMMARY
[facility]  Cardiology Heart Failure Service (Cards 2)  Discharge Summary       Patient Name: Tyler Renteria Jr.    Medical Record Number: [id number]    YOB: 1960  Date of admission:  6/12/2024  Date of discharge: 6/17/2024    Admitting provider: Paramjit Henley MD  Discharge provider: Subhash Toney  Primary provider: No primary care provider on file.    DISCHARGE DIAGNOSES:   1. Death     ITEMS FOR OUTPATIENT FOLLOW UP:   None     HPI: Please see the detailed H & P from 6/12/2024. Briefly, Tyler Renteria is 63 YO male with past medical history of end-stage kidney disease on hemodialysis MWF, coronary artery disease (status post CABG 2001, redo CABG 2011, multiple PCIs of unknown targets), afib, type 2 diabetes, ischemic cardiomyopathy (LVEF 30-35% on 4/19/2024) and trifascicular block status post PPM. He was recently admitted to [facility] from 5/21/24 to 5/31/24 for CHF exacerbation, hypotension, anemia and GI bleed. Underwent EGD 5/22/24 which showed 4 small non-bleeding angioectasias which were treated with APC and a single AVM in duodenum was treated with APC and clip. Colonoscopy on 5/24/24 showed multiple polyps and a large cecal polyp not amenable to removal endoscopically. Biopsy of mass consistent with tubulovillous adenoma. It was discussed that he would need hemicolectomy for removal of that mass but likely wouldn't tolerate the procedure currently as he didn't tolerate colonoscopy.      On 06/12 patient was [place]ooter [place] the free[place] when he collapsed, CPR initiated. No-flow time around 5 minutes.  Received 16 shocks in total, rhythm was asystole and VT/VF, total downtime 1 hour.  Rhythm and Cath Lab was asystole. Underwent VA ECMO cannulation and placement of intra-aortic balloon pump.  Coronary angiogram showed  of the ostial circumflex with a patent vein graft to OM1 and OM 2, small to medium caliber LAD with patent LIMA to distal LAD graft,  completely occluded large caliber diagonal vessel due to significant in-stent stenosis, diffusely diseased native RCA with extremely sluggish flow and a patent vein graft to RPDA. No intervention performed. Initial rhythm Asystole/VF.  During hospital stay patient patient developed multiorgan failre, CT head diffuse  anoxic-hypoxic brain injury. After discussing with family it was decided to proceed with comfort care. Patient  with family at the bedside at 10:23pm.         HOSPITAL COURSE BY PROBLEM:    Neurology   # Concern for anoxic brain injury    # Chronic appearing old infarct        Cardiovascular  # VF Cardiac arrest   # Cardiogenic shock   # CAD  # Cardiomyopathy  # Dyslipidemia  # CAD s/p CABG , redo CABG , multiple PCIs  # ICM - LVEF 30-35% on 2024       Pulmonary  # Acute hypoxemic respiratory failure requiring intubation  # Probable aspiration pneumonia  # Pulmonary edema           Gastrointestinal, Nutrition  # Concern for Shock liver / cardiac arrest  # Hypoalbuminemia   # Upper and lower GI bleed   No known medical hx.       Renal, Electrolytes  # Lactic acidosis  # ESRD on HD MWF       Infectious Disease  # Aspiration Pneumonia- in the setting of arrest.  CXR/CAP as above.   # Leukocytosis     Hematology  # Anemia of critical illness  # Tubulovillous adenoma of the cecum   # acute on chronic GI bleed  # Coagulopathy due to ecmo      Endocrinology  # Hyperglycemia    Integumentary:  Multiple bruising, weeping from sores on LE    Query request   Acute Blood Loss Anemia  Hyperkalemia resolved  -Hypocalcemia  Acute on Chronic Heart Failure with Reduced Ejection Fraction    PHYSICAL EXAM:  No Vital signs present.     PROCEDURES: VAECMO cannulation     CONSULTATIONS: Nephrology and palliative       IMAGING:  Results for orders placed or performed during the hospital encounter of 24   CT Head w/o Contrast    Narrative    CT HEAD W/O CONTRAST 2024 7:18 PM    Provided History:  arrest  ICD-10:    Comparison: None available.    Technique: Exam is obtained with dual-energy CT machine. Using  multidetector thin collimation helical acquisition technique, axial,  coronal and sagittal CT images from the skull base to the vertex were  obtained without intravenous contrast. Virtual nonenhanced CT images  were obtained.    Findings:  Residual contrast in the intracranial vasculature from same  day cardiac catheterization. Chronic appearing old infarct in the  right middle frontal gyrus gliosis of the underlying subcortical white  matter. Correlation with prior imaging would be helpful. No diffuse  cortical brain swelling/sulcal effacement or edema. Gray-white matter  differentiation is mostly preserved apart from the aforementioned  chronic finding within the limits of the exam. However virtual  nonenhanced CT shows diffuse loss of gray-white matter differentiation  loss throughout the parenchyma. This could be artifactual. Recommend  follow-up CT in 12-24 hours.  No intracranial hemorrhage, mass effect, or midline shift. The  ventricles are proportionate to the cerebral sulci. Opacification of  posterior nasopharyngeal airspace. Mild air fluid leveling in the  ethmoid sinuses and maxillary sinuses. Normal orbits. Normal soft  tissues.. Normal osseous structures.       Impression    Impression:  1.Chronic appearing old infarct in the right middle frontal gyrus  gliosis of the underlying subcortical white matter. Correlation with  prior imaging would be helpful if available.   2.Gray-white matter differentiation is mostly preserved apart from the  aforementioned chronic finding within the limits of the exam. However  virtual nonenhanced CT shows diffuse loss of gray-white matter  differentiation loss throughout the parenchyma. This could be  artifactual. Recommend follow-up CT in 12-24 hours.    HUMBLE BOOKER MD         SYSTEM ID:  [id number]   CT Chest Abdomen Pelvis w/o Contrast     Value     Radiologist flags T7 superior endplate fracture (Urgent)    Narrative    EXAMINATION: CT CHEST ABDOMEN PELVIS W/O CONTRAST, 6/12/2024 7:19 PM    TECHNIQUE:  Helical CT images from the thoracic inlet through the  symphysis pubis were obtained without IV contrast.     COMPARISON: None    HISTORY: arrest    FINDINGS:  Lines and Tubes: Endotracheal tube in the midthoracic trachea.  Intra-aortic balloon pump extends from the level of the juxtarenal  aorta to the proximal descending thoracic aorta. IVC ECMO cannula  terminates at the SVC. Left lower approach CVC terminates at the left  common iliac vein. Right lower approach arterial catheter terminates  at the right common iliac artery near the aortic bifurcation. Right  chest wall pacemaker with leads in the right atrium and ventricle.    CHEST:  THYROID: Multiple partially calcified thyroid nodules.     LUNGS/PLEURA: Diffuse dependent-predominant airspace opacities  bilaterally. Small bilateral pleural effusions. No suspicious lung  mass. No pneumothorax. Central tracheobronchial tree is patent with  layering intraluminal debris at the distal trachea and in the  posterior, inferior bronchi. Diffuse bilateral interlobular septal  thickening.    MEDIASTINUM: Cardiomegaly. No pericardial effusion. Surgical changes  of CABG. Severe coronary vessel atherosclerotic disease with  multilevel vessel bypass grafts. Normal caliber ascending aorta.  Dilated main pulmonary artery measuring 3.7 cm. No mediastinal  lymphadenopathy.    CHEST WALL: No suspicious axillary lymphadenopathy.    ABDOMEN/PELVIS:    Evaluation limited secondary lack contrast.    LIVER: No focal hepatic mass.    BILIARY: Cholelithiasis No intra- or extrahepatic biliary dilation.    PANCREAS: No focal pancreatic mass or ductal dilation. Mild  peripancreatic fat stranding greatest about the head.    SPLEEN: No splenic mass.    ADRENAL GLANDS: Within normal limits.    URINARY TRACT: Absent versus severely  atrophic left kidney. Moderately  atrophic right kidney. No appreciable suspicious renal mass.  Hyperattenuating material in the right renal pelvis, lower pole  calyces, and distal right ureter without hydronephrosis, likely  excreted iodinated contrast. Bladder is completely nondistended.    REPRODUCTIVE ORGANS: No suspicious pelvic mass.    STOMACH: Within normal limits.    BOWEL: Submucosal fat deposition along the ascending colon.   Normal  caliber of the small and large bowel. The appendix is normal in  diameter with possible appendicoliths.    PERITONEUM/FLUID: Small volume ascites. Diffuse mesenteric and  retroperitoneal fat stranding, particularly adjacent to the pancreatic  head, though this is partially increased due to motion. No free air or  focal collection.    VESSELS: Abdominal aorta is nonaneurysmal. Diffuse severe large vessel  calcific atherosclerosis.    LYMPH NODES: Diffuse prominent retroperitoneal lymph nodes do not meet  size criteria for enlargement. No mesenteric lymphadenopathy.    BONES/SOFT TISSUES: Multiple contiguous bilateral anterolateral rib  fractures consistent with changes from cardiopulmonary resuscitation.  Minimally displaced oblique T7 anterior superior endplate fracture  (series 10 image 103). Diffuse soft tissue anasarca. Bilateral  gynecomastia.      Impression    IMPRESSION:  1. Sequelae of cardiac arrest and cardiopulmonary resuscitation to  include bilateral pulmonary edema and pleural effusions, multiple  bilateral contiguous anterolateral rib fractures, small volume  ascites, diffuse body wall edema.  2. There are dependent-predominant airspace opacities felt to  represent edema as mentioned, though with debris in the trachea and  bronchi, this may also represent aspiration.  3. Severe diffuse large vessel calcific atherosclerosis.   4. Cholelithiasis.  5. Peripancreatic fat stranding greatest about the head is nonspecific  and appears worsened due to motion artifact.  This is favored to  represent edema in the setting of anasarca, though correlate for  pancreatitis.  6. Oblique minimally displaced fracture through the anterior superior  endplate of T7.    [Access Center: T7 superior endplate fracture]    This report will be copied to the [facility] to ensure a  provider acknowledges the finding. Access Center is available Monday  through Friday 8am-3:30 pm.     I have personally reviewed the examination and initial interpretation  and I agree with the findings.    BENTON DUNHAM DO         SYSTEM ID:  [id number]   XR Chest Port 1 View    Narrative    EXAM: XR CHEST PORT 1 VIEW 6/12/2024 8:41 PM    INDICATION: Check endotracheal tube placement and ECLS cannula  placement. DO NOT log-roll patient.  Place film under patient using  patient safety handling process.    COMPARISON: CT chest abdomen pelvis from 1853 hours    TECHNIQUE: Single AP view of the chest.    FINDINGS:   Endotracheal tube tip projects at the mid thoracic trachea. Gastric  tube tip and side-port project at the stomach. IVC approach ECMO  cannula terminates at the SVC. IABP superior marker terminates at the  aortic knob. Right chest cardiac pacemaker leads terminate at the  right atrium and ventricle. Postcardiac thoracic surgery with bypass  sutures/clips, intact sternotomy wires.    Interstitial and airspace opacities in the right lung and in the left  lung base. Cardiac silhouette is enlarged. Trace left costophrenic  angle blunting. No pneumothorax.      Impression    IMPRESSION:   1. Endotracheal tube tip terminates at the mid thoracic trachea. IVC  ECMO cannula terminates at the SVC. Superior IABP marker at the aortic  knob.  2. Moderate pulmonary edema.    I have personally reviewed the examination and initial interpretation  and I agree with the findings.    ABDI MUNOZ MD         SYSTEM ID:  [id number]   XR Chest Port 1 View    Narrative    XR CHEST PORT 1 VIEW  6/13/2024 1:38 AM     HISTORY:   Check endotracheal tube placement and ECLS cannula  placement. DO NOT log-roll patient.  Place film under patient using  patient safety handling process.       COMPARISON:  6/12/2024    TECHNIQUE: Portable, supine, frontal projection radiograph of the  chest.    FINDINGS:   Stable position of ET tube, right chest wall cardiac pacer, gastric  tube, intra-aortic balloon pump, and inferior approach ECMO cannula.  Trachea is midline. Stable cardiomegaly. Coronary artery stents. Trace  blunting of the left costophrenic angle. Sharp right costophrenic  angle. No pneumothorax. Bilateral perihilar hazy opacities.  Retrocardiac streaky opacities.        Impression    IMPRESSION:  Stable support devices. Stable opacities likely representing pulmonary  edema and atelectasis.    I have personally reviewed the examination and initial interpretation  and I agree with the findings.    BENTON DUNHAM DO         SYSTEM ID:  [id number]   US Lower Extremity Arterial Duplex Bilateral    Narrative    ULTRASOUND LOWER EXTREMITY ARTERIAL DUPLEX BILATERAL  6/12/2024 9:51  PM    CLINICAL HISTORY: Baseline to assess blood flow to Lower Extremities  (VA ECMO).     COMPARISONS: None available.    REFERRING PROVIDER: INGE HURLEY    TECHNIQUE: Bilateral leg arteries evaluated with color Doppler and  Doppler waveform ultrasound    FINDINGS: Abnormal waveforms consistent with ECMO and aortic balloon  pump.    RIGHT:       Common femoral artery: OBSCURED       Profundus femoral artery: OBSCURED         Superficial femoral artery, origin: OBSCURED       Superficial femoral artery, mid thigh: 17 cm/s, tardus parvus       Superficial femoral artery, distal thigh: 9 cm/s, tardus parvus         Popliteal artery: 12 cm/s, tardus parvus         Posterior tibial artery, ankle: 11 cm/s, tardus parvus       Anterior tibial artery, ankle: 0 cm/s, occluded       Dorsalis pedis artery: 0 cm/s, occluded    LEFT:       Common femoral artery: OBSCURED        Profundus femoral artery: OBSCURED         Superficial femoral artery, origin: OBSCURED       Superficial femoral artery, proximal: 8 cm/s, multiphasic       Superficial femoral artery, mid thigh: 11 cm/s, tardus parvus       Superficial femoral artery, distal thigh: 7 cm/s, tardus parvus         Popliteal artery: 10 cm/s, tardus parvus         Posterior tibial artery, ankle: 10 cm/s, tardus parvus       Anterior tibial artery, ankle: 9 cm/s, tardus parvus       Dorsalis pedis artery: 6 cm/s, tardus parvus      Impression    IMPRESSION:   1. No flow in the right anterior tibial and dorsalis pedis.    2. Tardus parvus waveforms suggest more proximal stenosis or  occlusion.    3. Slow Dopplerable flow in the right posterior tibial artery at the  ankle.    4. Slow Dopplerable flow in the left anterior tibial, posterior  tibial, and dorsalis pedis arteries.    I have personally reviewed the examination and initial interpretation  and I agree with the findings.    INGE SOARES MD         SYSTEM ID:  [id number]   US Carotid Bilateral    Narrative    ULTRASOUND CAROTID BILATERAL 6/12/2024 10:08 PM    CLINICAL HISTORY: Cardiac Arrest on ECMO    COMPARISON: None available.    REFERRING PROVIDER: INGE HURLEY    TECHNIQUE: Grayscale (B-mode) and duplex and spectral Doppler  ultrasound of the common carotid, extracranial internal carotid,  external carotid, and vertebral artery origins. Velocity measurements  obtained with angle correction at or less than 60 degrees.    FINDINGS: Abnormal waveforms consistent with ECMO and aortic balloon  pump.     RIGHT SIDE:     Plaque Morphology: Echogenic plaque causes less than 50% diameter  stenosis.       Proximal CCA: 90/28 cm/s     Mid CCA: 68/29 cm/s     Distal CCA: 74/21 cm/s           External CA: 110/32 cm/s       Proximal ICA: 141/59 cm/s     Mid ICA: 143/56 cm/s     Distal ICA: 154/62 cm/s       Vertebral artery: Antegrade: 85/40 cm/s     ICA/CCA ratio: 2.08     LEFT SIDE:      Plaque Morphology: Echogenic plaque causes less than 50% diameter  stenosis.        Proximal CCA: 107/43 cm/s     Mid CCA: 103/47 cm/s     Distal CCA: 93/34 cm/s           External CA: 89/22 cm/s       Proximal ICA: 152/49 cm/s     Mid ICA: 167/76 cm/s     Distal ICA: 132/70 cm/s       Vertebral artery: Antegrade: 70/16 cm/s     ICA/CCA ratio: 1.79       Impression    IMPRESSION:    1. RIGHT ICA: ICA/CCA ratio exceeds 2 but less than 50% diameter  stenosis suggested in grayscale and color Doppler images.    2. LEFT ICA:  Less than 50% diameter narrowing by grayscale imaging  and sonographic velocity criteria.    3. Abnormal waveforms consistent with ECMO and aortic balloon pump.    [facility] Commission Updated Recommendation for  Carotid Stenosis Interpretation Criteria - November 2023.  [email or web address]         Normal            ICA PSV: < 180 cm/s            Plaque Estimate: None            ICA/CCA PSV Ratio: < 2.0            ICA EDV: < 40 cm/s         < 50%            ICA PSV: < 180 cm/s            Plaque Estimate: < 50%            ICA/CCA PSV Ratio: < 2.0            ICA EDV: < 40 cm/s         50 - 69%            ICA PSV: 180 - 230 cm/s            Plaque Estimate: > 50%            ICA/CCA PSV Ratio: 2.0 - 4.0            ICA EDV: 40 - 100 cm/s     50 - 69%            ICA PSV: 125-180 cm/s            AND            ICA/CCA PSV Ratio: > or = 2.0         > 70% but less than near occlusion            ICA PSV: > 230 cm/s            Plaque Estimate: > 50%            AND either            ICA EDV: > 100 cm/s            or            ICA/CCA PSV Ratio: > 4.0         Near occlusion            ICA PSV: High, low, or undetectable            Plaque Estimate: Visible            ICA/CCA PSV Ratio: Variable            ICA EDV: Variable         Total occlusion            ICA PSV:  Undetectable            Plaque Estimate: Visible, no detectable lumen            ICA/CCA PSV Ratio: N/A            ICA EDV: N/A                                          Additional criteria from vascular surgery     > 80%       EDV > 120 cm/s    I have personally reviewed the examination and initial interpretation  and I agree with the findings.    INGE SOARES MD         SYSTEM ID:  [id number]   XR Chest Port 1 View    Narrative    XR CHEST PORT 1 VIEW  6/14/2024 2:08 AM     HISTORY:  Check endotracheal tube placement and ECLS cannula  placement. DO NOT log-roll patient.  Place film under patient using  patient safety handling process.       COMPARISON:  6/12/2024    TECHNIQUE: Portable, supine, frontal projection radiograph of the  chest.    FINDINGS:   Stable position of ET tube, right chest wall cardiac pacer, gastric  tube, intra-aortic balloon pump, and inferior approach ECMO cannula.     Trachea is midline. Stable cardiomegaly. Trace blunting of the left  costophrenic angle. Sharp right costophrenic angle. No pneumothorax.  Bilateral perihilar hazy opacities. Dense retrocardiac opacities.        Impression    IMPRESSION:  Stable support devices. Stable perihilar hazy opacities likely  representing pulmonary edema and atelectasis.    I have personally reviewed the examination and initial interpretation  and I agree with the findings.    ABDI MUNOZ MD         SYSTEM ID:  [id number]   US Lower Extremity Arterial Duplex Bilateral    Narrative    ULTRASOUND LOWER EXTREMITY ARTERIAL DUPLEX BILATERAL  6/13/2024 6:49  PM    CLINICAL HISTORY: Loss of pulses even with pressors down and  normothermic.     COMPARISONS: Ultrasound lower extremity arterial duplex bilateral  6/12/2024    REFERRING PROVIDER: JULIANNE BROOKS    TECHNIQUE: Bilateral leg arteries evaluated with color Doppler and  Doppler waveform ultrasound    FINDINGS:     RIGHT:       Common femoral artery: OBSCURED       Profundus femoral artery: OBSCURED          Superficial femoral artery, origin: OBSCURED       Superficial femoral artery, mid thigh: 12/5 cm/s, tardus parvus        Superficial femoral artery, distal thigh: 9/2 cm/s, tardus parvus         Popliteal artery: 13/6 cm/s, tardus parvus         Posterior tibial artery, ankle: 11/4 cm/s, tardus parvus       Anterior tibial artery, ankle: 24/10 cm/s, tardus parvus    LEFT:       Common femoral artery: OBSCURED       Profundus femoral artery: OBSCURED         Superficial femoral artery, origin: OBSCURED       Superficial femoral artery, mid thigh: 20/5 cm/s, tardus parvus       Superficial femoral artery, distal thigh: 17/5 cm/s, tardus  parvus         Popliteal artery: 12/5 cm/s, tardus parvus         Posterior tibial artery, ankle: 9/2 cm/s, tardus parvus       Anterior tibial artery, ankle: 12/5 cm/s, tardus parvus      Impression    IMPRESSION:  1. Flow restored to the right anterior tibial artery.    2. Waveforms suggest more proximal stenosis or occlusion. Slow  Dopplerable flow in bilateral posterior and anterior tibial arteries  at the ankles.    I have personally reviewed the examination and initial interpretation  and I agree with the findings.    INGE SOARES MD         SYSTEM ID:  [id number]   XR Chest Port 1 View    Narrative    XR CHEST PORT 1 VIEW  6/15/2024 2:28 AM     HISTORY:  Check endotracheal tube placement and ECLS cannula  placement. DO NOT log-roll patient.  Place film under patient using  patient safety handling process.       COMPARISON:  6/14/2024    TECHNIQUE: Portable, supine, frontal projection radiograph of the  chest.    FINDINGS:   Stable position of ET tube, inferior approach ECMO cannula,  intra-aortic balloon pump, right chest wall cardiac pacer, gastric  tube.    Trachea is midline. Stable cardiomegaly. Left costophrenic angle is  excluded from the field-of-view. No right-sided pleural effusion. No  appreciable pneumothorax. Slightly decreased since left basilar  hazy  opacities.        Impression    IMPRESSION:  Stable support devices. Slightly decreased left basilar hazy  opacities, likely atelectasis versus edema.    I have personally reviewed the examination and initial interpretation  and I agree with the findings.    DANIEL MOY MD         SYSTEM ID:  [id number]   CT Head w/o Contrast    Narrative    EXAM: CT HEAD W/O CONTRAST  6/14/2024 3:22 PM     HISTORY: please eval for herniation/anoxic injury       COMPARISON: 6/12/2024    TECHNIQUE: Using multidetector thin collimation helical acquisition  technique, axial, coronal and sagittal CT images from the skull base  to the vertex were obtained without intravenous contrast.   (topogram) image(s) also obtained and reviewed.    FINDINGS: Diffuse softening of the gray-white matter differentiation  of the cerebral parenchyma. Of note, there is new hypoattenuating foci  developing within the bilateral basal ganglia (series 3, image 21),  which are consistent with progressive changes of anoxic brain injury.  No acute hemorrhage, mass effect, or midline shift. There is no  evidence of herniation. Basal cisterns are clear. No hydrocephalus.    The calvarium and bones of the skull base are within normal limits.  Scattered mucosal thickening of the paranasal sinuses. The mastoid air  cells are clear. The orbits are unremarkable.      Impression    IMPRESSION:   1. Diffuse softening of the gray-white matter differentiation with  more advanced changes noted within the bilateral basal ganglia.  Findings are consistent with prolonged anoxic brain injury.a  2. No acute infarction, mass effect or midline shift. Specifically, no  evidence of herniation or elevated intracranial pressure.    I have personally reviewed the examination and initial interpretation  and I agree with the findings.    HUMBLE BOOKER MD         SYSTEM ID:  [id number]   XR Chest Port 1 View    Narrative    XR CHEST PORT 1 VIEW  6/16/2024 3:01 AM     HISTORY:   Check endotracheal tube placement and ECLS cannula  placement. DO NOT log-roll patient.  Place film under patient using  patient safety handling process.       COMPARISON:  6/15/2024    TECHNIQUE: Portable, supine, frontal projection radiograph of the  chest.    FINDINGS:   Stable position of inferior approach ECMO cannula, right chest wall  cardiac pacer, gastric tube, ET tube, and intra-aortic balloon pump.    Trachea is midline. Stable cardiomegaly. Coronary stent/coronary  atherosclerosis. Stable retrocardiac opacity silhouetting the left  hemidiaphragm. Trace blunting of the left costophrenic angle. Sharp  right costophrenic angle. No pneumothorax.        Impression    IMPRESSION:  Stable support devices. Similar left basilar opacification, likely  atelectasis versus edema.    I have personally reviewed the examination and initial interpretation  and I agree with the findings.    DANIEL MOY MD         SYSTEM ID:  [id number]   XR Chest Port 1 View    Narrative    Exam: XR CHEST PORT 1 VIEW, 6/17/2024 1:22 AM    Comparison: 6/16/2024    History: Check endotracheal tube placement and ECLS cannula placement.  DO NOT log-roll patient.  Place film under patient using patient  safety handling process.    Findings:  Portable AP view of the chest. Stable endotracheal tube and inferior  approach ECMO cannula. Right chest wall pacemaker. Intra-aortic  balloon pump stable position with superior marker 1.2 cm above the  mathieu. Median sternotomy wires. Stable enlarged cardiac silhouette.  Left basilar and retrocardiac opacity. No pneumothorax. Minimal right  basilar opacities.       Impression    Impression: Stable support devices. Slightly increased bibasilar  opacities, greater on the left, likely atelectasis and edema.     I have personally reviewed the examination and initial interpretation  and I agree with the findings.    DANIEL MOY MD         SYSTEM ID:  [id number]   CT Head w/o Contrast    Narrative     EXAM: Head CT without contrast 6/16/2024 2:31 PM    HISTORY: Continued loss of brain stem reflexes, interval changes.    COMPARISON: Head CT 6/14/2024, 6/12/2024.    TECHNIQUE: Using multidetector thin collimation helical acquisition  technique, axial, coronal and sagittal CT images from the skull base  to the vertex were obtained without intravenous contrast.   (topogram) image(s) also obtained and reviewed.    FINDINGS:  There is no intracranial hemorrhage, mass effect, midline shift, or  extra-axial fluid collection. Diffuse bilateral and symmetric loss of  gray-white matter differentiation in the cerebral hemispheres.  Ill-defined area of parenchymal hypodensity in the right frontal lobe  (series 3 image 30) appear similar to prior. Ventricles are  proportionate to the cerebral sulci. New minimal effacement of the  basal cisterns. No herniation. Abnormal hypoattenuation in the  brainstem, bilateral basal ganglia, and bilateral thalami similar to  CT 6/14/2024. Hypoattenuation is more conspicuous in the jaylin compared  to the prior study. Small incidental posterior fossa arachnoid cyst.  Vascular calcifications.    The bony calvaria and the bones of the skull base are normal. The  orbits are intact. Layering fluid in the right maxillary sinus and  sphenoid sinus. Mild lateral mastoid effusions.      Impression    IMPRESSION:   Slight progression of sequelae from diffuse anoxic-hypoxic brain  injury. No herniation.    I have personally reviewed the examination and initial interpretation  and I agree with the findings.    KEVON GOLDEN MD         SYSTEM ID:  [id number]   CTA Head Neck with Contrast    Narrative    EXAM: CTA HEAD NECK W CONTRAST  6/17/2024 12:09 PM     HISTORY: f/u stroke, arrest on ECMO       COMPARISON: MA CT head    TECHNIQUE:  HEAD and NECK CTA: During rapid bolus intravenous injection of  nonionic contrast material, axial images were obtained using thin  collimation multidetector helical  technique from the base of the upper  aortic arch through the [place]. This CT angiogram data was  reconstructed at thin intervals with mild overlap. Images were sent to  the 3D workstation, and 3D reconstructions were obtained. The axial  source images, multiplanar reformations, 3D reconstructions in both  maximum intensity projection display and volume rendered models were  reviewed, with reconstructions performed by the technologist.    CONTRAST: iopamidol (ISOVUE-370) solution 67 mL    FINDINGS:  Head CTA demonstrates no large vessel arterial occlusion. No aneurysm.  Significant venous contamination. Irregularity is present at least  moderate degree stenosis in the basilar artery. Concentric moderate  stenosis in the cavernous internal carotid artery secondary to  concentric calcified plaques.    Neck CTA demonstrates patent great vessel origins. Mild-to-moderate  atherosclerotic plaque at the right carotid bifurcation without  high-grade stenosis. Moderate atherosclerotic plaque at the left  carotid bulb with approximately 50% stenosis in the proximal ICA. The  normal distal right internal carotid artery is patent measuring 4 mm.  The normal distal left internal carotid artery is patent measuring 4  mm. Scattered atherosclerotic plaque in the cavernous and clinoid  segments of the internal carotid arteries bilaterally with mild  stenosis in the right ICA terminus. Diminutive V1-2 segments of the  left vertebral artery. Multifocal atherosclerotic plaque multifocal  high-grade stenoses and occlusion within the V3 through V4 segments of  the left vertebral artery.     No acute finding in the visualized intracranial, orbital, and skull  base structures. Mild scattered paranasal sinus mucosal thickening and  bilateral mastoid effusions.    Endotracheal tube in the midthoracic trachea. Dependent debris  layering throughout the trachea. Partially visualized moderate to  large bilateral pleural effusions with  left atelectasis and/or  infiltrate.         Impression    IMPRESSION:  1. Head CTA demonstrates patent major intracranial arteries. No  aneurysm. Moderate stenosis in the basilar artery. Moderate stenosis  in the bilateral cavernous internal carotid arteries  2. Neck CTA demonstrates multifocal high-grade stenoses and occlusion  of the left vertebral artery V3 and V4 segments. Approximately 50%  stenosis in the left proximal internal carotid artery.    I have personally reviewed the examination and initial interpretation  and I agree with the findings.    HUMBLE BOOKER MD         SYSTEM ID:  [id number]   CT Head w/o Contrast    Narrative    EXAM: CT HEAD W/O CONTRAST  6/17/2024 12:09 PM     HISTORY: f/u stroke, arrest on ECMO       COMPARISON: CT head 6/16/2024    TECHNIQUE: Using multidetector thin collimation helical acquisition  technique, axial, coronal and sagittal CT images from the skull base  to the vertex were obtained without intravenous contrast.   (topogram) image(s) also obtained and reviewed.    FINDINGS:  No acute intracranial hemorrhage, mass effect, or midline shift.  Continued diffuse bilateral symmetric loss of gray-white matter  differentiation in the cerebral hemispheres. Abnormal hypoattenuation  of the brainstem, bilateral thalami, and bilateral basal ganglia,  similar to prior. Ventricles are proportionate to the cerebral sulci.  Minimal effacement of the basal cisterns, similar to prior. No  herniation. Small posterior fossa arachnoid cyst.    The bony calvaria and the bones of the skull base are normal. Mild  mucosal thickening within the sphenoid sinuses. Small left mastoid  effusion.. Grossly normal orbits.       Impression    IMPRESSION: Stable to slightly progressed sequela of diffuse  anoxic-hypoxic brain injury. No herniation.    I have personally reviewed the examination and initial interpretation  and I agree with the findings.    HUMBLE BOOKER MD         SYSTEM ID:  [id number]    Echocardiogram Complete     Value    LVEF  10-15% (severely reduced)    [facility]    [id number]  [id number]  [id number]  [id number]^MEI^INGE^NEGRO     [facility],[place]  Echocardiography Laboratory  [address] [place] [id number]     Name: JOANNA VILA JR.  MRN: [id number]  : 1960  Study Date: 2024 07:41 AM  Age: 64 yrs  Gender: Male  Patient Location: [facility]  Reason For Study: Cardiac Arrest  Ordering Physician: INGE HURLEY  Performed By: Mena Joseph RDCS     BSA: 2.2 m2  Height: 70 in  Weight: 236 lb  HR: 76  BP: 108/40 mmHg  ______________________________________________________________________________  Procedure  Complete Portable Echo Adult.  ______________________________________________________________________________  Interpretation Summary  VA ECMO 4.6 LPM, IABP  Left ventricular function is decreased. The ejection fraction is 10-15%  (severely reduced). Septum is deviated to the left.  Global right ventricular function is severely reduced.  The aortic valve opens with every cardiac cycle. No aortic regurgitation is  present.  No pericardial effusion is present.  ______________________________________________________________________________  Left Ventricle  Left ventricular size is normal. Left ventricular function is decreased. The  ejection fraction is 10-15% (severely reduced). Septum is deviated to the  left.     Right Ventricle  Global right ventricular function is severely reduced. A right heart catheter  is noted in the right ventricle.     Atria  Mild biatrial enlargement is present.     Mitral Valve  The mitral valve is normal.     Aortic Valve  The aortic valve opens with every cardiac cycle. No aortic regurgitation is  present.     Tricuspid Valve  The tricuspid valve is normal. Trace tricuspid insufficiency is present.     Pulmonic Valve  Mild pulmonic insufficiency is present.     Vessels  Sinuses of Valsalva 4.3 cm. Ascending aorta  4.3 cm. Dilation of the inferior  vena cava is present with abnormal respiratory variation in diameter. Unable  to assess mean RA pressure given the patient is on a ventilator. ECMO cannula  is present in the IVC. IABP in the abdominal aorta.     Pericardium  No pericardial effusion is present.     Miscellaneous  A right pleural effusion is present.  ______________________________________________________________________________  MMode/2D Measurements & Calculations     RVDd: 5.4 cm  IVSd: 1.8 cm  LVIDd: 5.0 cm  LVIDs: 5.1 cm  LVPWd: 1.3 cm  FS: -1.6 %  LV mass(C)d: 338.4 grams  LV mass(C)dI: 151.1 grams/m2  Ao root diam: 4.3 cm  asc Aorta Diam: 4.3 cm  LVOT diam: 2.2 cm  LVOT area: 4.0 cm2  RVOT diam: 3.7 cm  Ao root diam index Ht(cm/m): 2.4  Ao root diam index BSA (cm/m2): 1.9  Asc Ao diam index BSA (cm/m2): 1.9  Asc Ao diam index Ht(cm/m): 2.4  RWT: 0.53     Doppler Measurements & Calculations  LV V1 max P.4 mmHg  LV V1 max: 59.6 cm/sec  LV V1 VTI: 6.0 cm     SV(LVOT): 23.9 ml  SI(LVOT): 10.7 ml/m2  Qp/Qs: 1.6/1.0     ______________________________________________________________________________  Report approved by: Paz Rose 2024 09:25 AM         Cardiac Catheterization    Narrative      Ost Cx to Dist Cx lesion is 100% stenosed.    1st Diag lesion is 100% stenosed.    2nd Mrg lesion is 100% stenosed.    Central Venous Catheter  was placed. Ultrasound was used to visualize   the left femoral vein. Placement was successful.    Arterial Line was placed. Ultrasound was used to visualize the right   radial artery right radial artery. Placement was successful.    VT/V-fib arrest.  Successful ECMO cannulation with 17 Thai right common femoral arterial   and 25 Thai right femoral venous cannulas.  Successful intra-aortic balloon pump placement via 9 Thai left common   femoral arterial sheath.  Successful placement of triple-lumen central venous catheter in the left   femoral vein.  Successful  placement of right radial arterial line.      Severe multivessel coronary disease status post coronary bypass grafting.  Ostial circumflex is completely occluded with patent vein graft to OM1 and   OM 2.  Small to medium caliber LAD with patent LIMA to distal LAD graft. Distal   LAD supplies very small territory.  Completely occluded large caliber diagonal vessel due to significant   in-stent restenosis.  Native RCA is diffusely disease with extremely sluggish flow.  Patent vein graft to RPDA.     Cardiac Device Check - Inpatient     Value    Date Time Interrogation Session 20,240,613,084,651    Implantable Pulse Generator  Medtronic    Implantable Pulse Generator Model [id number] Tena XT DR MRI    Implantable Pulse Generator Serial Number [id number]    Type Interrogation Session In Clinic    Clinic Name [facility]    Implantable Pulse Generator Type Pacemaker    Implantable Pulse Generator Implant Date 20,180,925    Implantable Lead  Medtronic    Implantable Lead Model 5076 CapSureFix Novus MRI SureScan    Implantable Lead Serial Number [id number]    Implantable Lead Implant Date 20,180,925    Implantable Lead Polarity Type Bipolar Lead    Implantable Lead Location Detail 1 UNKNOWN    Implantable Lead Location Right Atrium    Implantable Lead Connection Status Connected    Implantable Lead  Medtronic    Implantable Lead Model 5076 CapSureFix Novus MRI SureScan    Implantable Lead Serial Number [id number]    Implantable Lead Implant Date 20180925    Implantable Lead Polarity Type Bipolar Lead    Implantable Lead Location Detail 1 UNKNOWN    Implantable Lead Location Right Ventricle    Implantable Lead Connection Status Connected    Toni Setting Mode (NBG Code) DDDR    Toni Setting Lower Rate Limit 60    Toni Setting Maximum Tracking Rate 130    Toni Setting Maximum Sensor Rate 130    Toni Setting Hysterisis Rate DISABLED    Toni Setting FABIOLA Delay Low 150     Toni Setting PAV Delay Low 180    Toni Setting AT Mode Switch Rate 171    Lead Channel Setting Sensing Polarity Bipolar    Lead Channel Setting Sensing Anode Location Right Atrium    Lead Channel Setting Sensing Anode Terminal Ring    Lead Channel Setting Sensing Cathode Location Right Atrium    Lead Channel Setting Sensing Cathode Terminal Tip    Lead Channel Setting Sensing Sensitivity 0.15    Lead Channel Setting Sensing Polarity Bipolar    Lead Channel Setting Sensing Anode Location Right Ventricle    Lead Channel Setting Sensing Anode Terminal Ring    Lead Channel Setting Sensing Cathode Location Right Ventricle    Lead Channel Setting Sensing Cathode Terminal Tip    Lead Channel Setting Sensing Sensitivity 0.9    Lead Channel Setting Pacing Polarity Bipolar    Lead Channel Setting Pacing Anode Location Right Atrium    Lead Channel Setting Pacing Anode Terminal Ring    Lead Channel Setting Sensing Cathode Location Right Atrium    Lead Channel Setting Sensing Cathode Terminal Tip    Lead Channel Setting Pacing Pulse Width 0.4    Lead Channel Setting Pacing Amplitude 1.75    Lead Channel Setting Pacing Capture Mode Adaptive    Lead Channel Setting Pacing Polarity Bipolar    Lead Channel Setting Pacing Anode Location Right Ventricle    Lead Channel Setting Pacing Anode Terminal Ring    Lead Channel Setting Sensing Cathode Location Right Ventricle    Lead Channel Setting Sensing Cathode Terminal Tip    Lead Channel Setting Pacing Pulse Width 0.4    Lead Channel Setting Pacing Amplitude 3.75    Lead Channel Setting Pacing Capture Mode Adaptive    Zone Setting Type Category VF    Zone Setting Vendor Type Category V High Rate    Zone Setting Type Category VT    Zone Setting Vendor Type Category FastVT    Zone Setting Type Category VT    Zone Setting Vendor Type Category VT    Zone Setting Type Category VT    Zone Setting Vendor Type Category MonVT    Zone Setting Status Monitor    Zone Setting Detection Interval  350    Zone Setting Type Category ATRIAL_FIBRILLATION    Zone Setting Vendor Type Category FastATAF    Zone Setting Type Category AT/AF    Zone Setting Status Monitor    Zone Setting Detection Interval 350    Lead Channel Impedance Value 285    Lead Channel Impedance Value 228    Lead Channel Sensing Intrinsic Amplitude 0.5    Lead Channel Pacing Threshold Amplitude 1.0    Lead Channel Pacing Threshold Pulse Width 0.4    Lead Channel Impedance Value 285    Lead Channel Impedance Value 228    Lead Channel Pacing Threshold Amplitude 1.75    Lead Channel Pacing Threshold Pulse Width 0.4    Battery Date Time of Measurements 20,240,613,081,502    Battery Status Middle of Service    Battery RRT Trigger 2.625    Battery Remaining Longevity 42    Battery Voltage 2.95    Toni Statistic Date Time Start 20,240,110,092,728    Toni Statistic Date Time End 20,240,613,084,651    Toni Statistic RA Percent Paced 35.97    Toni Statistic RV Percent Paced 84.03    Toni Statistic AP  Percent 40.26    Toni Statistic AS  Percent 45.35    Toni Statistic AP VS Percent 0.91    Toni Statistic AS VS Percent 13.44    Atrial Tachy Statistic Date Time Start 20,240,110,092,728    Atrial Tachy Statistic Date Time End 20,240,613,084,651    Atrial Tachy Statistic AT/AF [place] Percent 36.7    Therapy Statistic Recent Date Time Start 20,240,110,092,728    Therapy Statistic Recent Date Time End 20,240,613,084,651    Therapy Statistic Total  Date Time Start 20,180,925,174,508    Therapy Statistic Total  Date Time End 20,240,613,084,651    Episode Statistic Recent Count 5,199    Episode Statistic Type Category AT/AF    Episode Statistic Recent Count 0    Episode Statistic Type Category Patient Activated    Episode Statistic Recent Count 0    Episode Statistic Type Category SVT    Episode Statistic Recent Count 26    Episode Statistic Type Category VT    Episode Statistic Recent Count 1    Episode Statistic Type Category VT    Episode Statistic  Recent Date Time Start 20,240,110,092,728    Episode Statistic Recent Date Time End 20,240,613,084,651    Episode Statistic Recent Date Time Start 83898462197099    Episode Statistic Recent Date Time End 72325646220187    Episode Statistic Recent Date Time Start 87482983498626    Episode Statistic Recent Date Time End 63339430405029    Episode Statistic Recent Date Time Start 81964387578883    Episode Statistic Recent Date Time End 80981111670604    Episode Statistic Recent Date Time Start 16834748937507    Episode Statistic Recent Date Time End 72102953490690    Episode Statistic Total Count 10,840    Episode Statistic Type Category AT/AF    Episode Statistic Total Count 0    Episode Statistic Type Category Patient Activated    Episode Statistic Total Count 0    Episode Statistic Type Category SVT    Episode Statistic Total Count 43    Episode Statistic Type Category VT    Episode Statistic Total Count 1    Episode Statistic Type Category VT    Episode Statistic Total Date Time Start 20,180,925,174,508    Episode Statistic Total Date Time End 20,240,613,084,651    Episode Statistic Total Date Time Start 55673493201819    Episode Statistic Total Date Time End 61997682468694    Episode Statistic Total Date Time Start 64443517054857    Episode Statistic Total Date Time End 47902374168187    Episode Statistic Total Date Time Start 78858098501595    Episode Statistic Total Date Time End 05290449591661    Episode Statistic Total Date Time Start 57432726515887    Episode Statistic Total Date Time End 96211111774774    Episode Identifier 10,852    Episode Type Category VT1_MONITOR    Episode Date Time 20,240,612,155,150    Episode Duration 25    Episode Type Induced Flag NO    Episode Identifier 69311    Episode Type Category VT    Episode Date Time [id number]    Episode Duration 1    Episode Type Induced Flag NO    Episode Identifier 20501    Episode Type Category VT    Episode Date Time [id number]    Episode  Duration 1    Episode Type Induced Flag NO    Episode Identifier 86368    Episode Type Category VT    Episode Date Time [id number]    Episode Duration 1    Episode Type Induced Flag NO    Episode Identifier 96110    Episode Type Category VT    Episode Date Time [id number]    Episode Duration 1    Episode Type Induced Flag NO    Episode Identifier 48316    Episode Type Category VT    Episode Date Time [id number]    Episode Duration 0    Episode Type Induced Flag NO    Episode Identifier 31919    Episode Type Category VT    Episode Date Time 01812366151517    Episode Duration 1    Episode Type Induced Flag NO    Episode Identifier 63380    Episode Type Category VT    Episode Date Time [id number]    Episode Duration 1    Episode Type Induced Flag NO    Episode Identifier 48104    Episode Type Category VT    Episode Date Time [id number]    Episode Duration 0    Episode Type Induced Flag NO    Episode Identifier 93205    Episode Type Category VT    Episode Date Time 72383921006075    Episode Duration 1    Episode Type Induced Flag NO    Episode Identifier 35729    Episode Type Category VT    Episode Date Time [id number]    Episode Duration 1    Episode Type Induced Flag NO    Episode Identifier 81024    Episode Type Category VT    Episode Date Time [id number]    Episode Duration 1    Episode Type Induced Flag NO    Episode Identifier 10389    Episode Type Category VT    Episode Date Time [id number]    Episode Duration 0    Episode Type Induced Flag NO    Episode Identifier 11170    Episode Type Category VT    Episode Date Time [id number]    Episode Duration 0    Episode Type Induced Flag NO    Episode Identifier 35069    Episode Type Category VT    Episode Date Time [id number]    Episode Duration 1    Episode Type Induced Flag NO    Episode Identifier 69635    Episode Type Category VT    Episode Date Time [id number]    Episode Duration 1    Episode Type Induced Flag NO     Episode Identifier 42182    Episode Type Category Monitor    Episode Date Time [id number]    Episode Duration 54    Episode Type Induced Flag NO    Episode Identifier 06689    Episode Type Category Monitor    Episode Date Time [id number]    Episode Duration 89    Episode Type Induced Flag NO    Episode Identifier 05366    Episode Type Category Monitor    Episode Date Time [id number]    Episode Duration 119    Episode Type Induced Flag NO    Episode Identifier 91198    Episode Type Category Monitor    Episode Date Time [id number]    Episode Duration 34    Episode Type Induced Flag NO    Episode Identifier 07558    Episode Type Category Monitor    Episode Date Time [id number]    Episode Duration 45    Episode Type Induced Flag NO    Episode Identifier 93061    Episode Type Category Monitor    Episode Date Time [id number]    Episode Duration 30    Episode Type Induced Flag NO    Episode Identifier 55924    Episode Type Category Monitor    Episode Date Time [id number]    Episode Duration 477    Episode Type Induced Flag NO    Episode Identifier 82751    Episode Type Category Monitor    Episode Date Time [id number]    Episode Duration 88    Episode Type Induced Flag NO    Episode Identifier 38137    Episode Type Category Monitor    Episode Date Time [id number]    Episode Duration 13    Episode Type Induced Flag NO    Episode Identifier 81532    Episode Type Category Monitor    Episode Date Time 94559202506496    Episode Duration 92    Episode Type Induced Flag NO    Episode Identifier 62217    Episode Type Category Monitor    Episode Date Time [id number]    Episode Duration 137    Episode Type Induced Flag NO    Episode Identifier 40548    Episode Type Category Monitor    Episode Date Time [id number]    Episode Duration 37    Episode Type Induced Flag NO    Episode Identifier 75373    Episode Type Category Monitor    Episode Date Time [id number]    Episode Duration 169     Episode Type Induced Flag NO    Episode Identifier 52514    Episode Type Category Monitor    Episode Date Time [id number]    Episode Duration 31    Episode Type Induced Flag NO    Episode Identifier 10624    Episode Type Category Monitor    Episode Date Time [id number]    Episode Duration 223    Episode Type Induced Flag NO    Episode Identifier 74820    Episode Type Category Monitor    Episode Date Time [id number]    Episode Duration 3    Episode Type Induced Flag NO    Episode Identifier 46415    Episode Type Category Monitor    Episode Date Time [id number]    Episode Duration 286    Episode Type Induced Flag NO    Episode Identifier 86458    Episode Type Category Monitor    Episode Date Time [id number]    Episode Duration 35    Episode Type Induced Flag NO    Episode Identifier 99346    Episode Type Category Monitor    Episode Date Time [id number]    Episode Duration 18    Episode Type Induced Flag NO    Episode Identifier 98434    Episode Type Category Monitor    Episode Date Time [id number]    Episode Duration 178    Episode Type Induced Flag NO    Episode Identifier 94809    Episode Type Category Monitor    Episode Date Time [id number]    Episode Duration 270    Episode Type Induced Flag NO    Episode Identifier 55615    Episode Type Category Monitor    Episode Date Time [id number]    Episode Duration 125    Episode Type Induced Flag NO    Episode Identifier [id number]    Episode Type Category Monitor    Episode Date Time [id number]    Episode Duration 98    Episode Type Induced Flag NO    Episode Identifier [id number]    Episode Type Category Monitor    Episode Date Time [id number]    Episode Duration 142    Episode Type Induced Flag NO    Episode Identifier [id number]    Episode Type Category Monitor    Episode Date Time [id number]    Episode Duration 128    Episode Type Induced Flag NO    Episode Identifier [id number]    Episode Type Category Monitor    Episode Date Time  [id number]    Episode Duration 58    Episode Type Induced Flag NO    Episode Identifier [id number]    Episode Type Category Monitor    Episode Date Time [id number]    Episode Duration 27    Episode Type Induced Flag NO    Episode Identifier [id number]    Episode Type Category Monitor    Episode Date Time [id number]    Episode Duration 203    Episode Type Induced Flag NO    Episode Identifier [id number]    Episode Type Category Monitor    Episode Date Time [id number]    Episode Duration 347    Episode Type Induced Flag NO    Episode Identifier 95075    Episode Type Category Monitor    Episode Date Time [id number]    Episode Duration 18    Episode Type Induced Flag NO    Episode Identifier 13424    Episode Type Category Monitor    Episode Date Time [id number]    Episode Duration 640    Episode Type Induced Flag NO    Episode Identifier 54905    Episode Type Category Monitor    Episode Date Time [id number]    Episode Duration 24    Episode Type Induced Flag NO    Episode Identifier 32742    Episode Type Category Monitor    Episode Date Time [id number]    Episode Duration 201    Episode Type Induced Flag NO    Episode Identifier 11596    Episode Type Category Monitor    Episode Date Time [id number]    Episode Duration 384    Episode Type Induced Flag NO    Episode Identifier 80905    Episode Type Category Monitor    Episode Date Time [id number]    Episode Duration 105    Episode Type Induced Flag NO    Episode Identifier 19026    Episode Type Category Monitor    Episode Date Time [id number]    Episode Duration 84    Episode Type Induced Flag NO    Episode Identifier 01591    Episode Type Category Monitor    Episode Date Time [id number]    Episode Duration 481    Episode Type Induced Flag NO    Episode Identifier 76747    Episode Type Category Monitor    Episode Date Time [id number]    Episode Duration 59    Episode Type Induced Flag NO    Episode Identifier 75244    Episode Type  Category Monitor    Episode Date Time [id number]    Episode Duration 1,012    Episode Type Induced Flag NO    Episode Identifier 03593    Episode Type Category Monitor    Episode Date Time [id number]    Episode Duration 27    Episode Type Induced Flag NO    Episode Identifier 86723    Episode Type Category Monitor    Episode Date Time [id number]    Episode Duration 199    Episode Type Induced Flag NO    Episode Identifier 51640    Episode Type Category Monitor    Episode Date Time [id number]    Episode Duration 162    Episode Type Induced Flag NO    Episode Identifier 45475    Episode Type Category Monitor    Episode Date Time [id number]    Episode Duration 319    Episode Type Induced Flag NO    Episode Identifier 74754    Episode Type Category Monitor    Episode Date Time [id number]    Episode Duration 47    Episode Type Induced Flag NO    Episode Identifier 71339    Episode Type Category Monitor    Episode Date Time [id number]    Episode Duration 57    Episode Type Induced Flag NO    Episode Identifier 73142    Episode Type Category Monitor    Episode Date Time [id number]    Episode Duration 49    Episode Type Induced Flag NO    Episode Identifier 78613    Episode Type Category Monitor    Episode Date Time [id number]    Episode Duration 153    Episode Type Induced Flag NO    Episode Identifier 50103    Episode Type Category Monitor    Episode Date Time [id number]    Episode Duration 74    Episode Type Induced Flag NO    Episode Identifier 51204    Episode Type Category Monitor    Episode Date Time [id number]    Episode Duration 197    Episode Type Induced Flag NO    Episode Identifier 99874    Episode Type Category Monitor    Episode Date Time [id number]    Episode Duration 146    Episode Type Induced Flag NO    Narrative    Patient seen in [address] for evaluation and iterative programming of their   pacemaker per MD orders.    Device: Medtronic [id number] [place] XT   MRI  Normal device function  Mode: DDDR  bpm  AP: 36%  : 84%  Intrinsic Rhythm: CHB; AS 75 bpm/ 30 bpm with frequent PVCs   Short V-V intervals: 14   Lead Trends Appear Stable   Estimated battery longevity to RRT = 3.5 years/Battery voltage = 2.95 V.   Atrial Arrhythmia: Ongoing AF from 4/2024 to 6/12/24.   AF [place] = 36.7%  Anticoagulant: heparin  Ventricular Arrhythmia: 27 VT episodes recorded. Available data reveals VT   episode first occurring at 1551 on 6/12/24. EGM shows AP/ markers at 122   bpm  followed by monomorphic VT lasting >25 sec- rhythm then degrades into   a disorganized ventricular arrhythmia and continues.      DEANDRE Crowley RN    Dual lead pacemaker    I have reviewed and interpreted the device interrogation, settings,   programming and nurse's summary. The device is functioning within normal   device parameters. I agree with the current findings, assessment and plan.         DISCHARGE MEDICATIONS:  Current Discharge Medication List        CONTINUE these medications which have NOT CHANGED    Details   apixaban ANTICOAGULANT (ELIQUIS) 2.5 MG tablet Take 2.5 mg by mouth 2 times daily      atorvastatin (LIPITOR) 40 MG tablet Take 40 mg by mouth daily      calcitRIOL (ROCALTROL) 0.25 MCG capsule Take 0.75 mcg by mouth three times a week      gabapentin (NEURONTIN) 300 MG capsule Take 300 mg by mouth 3 times daily      lidocaine-prilocaine (EMLA) 2.5-2.5 % external cream Apply topically as needed for moderate pain Apply over dialysis access 1/2 hour prior to each dialysis treatment      midodrine (PROAMATINE) 10 MG tablet Take 10 mg by mouth daily      nitroGLYcerin (NITROSTAT) 0.4 MG sublingual tablet Place 0.4 mg under the tongue every 5 minutes as needed for chest pain For chest pain place 1 tablet under the tongue every 5 minutes for 3 doses. If symptoms persist 5 minutes after 1st dose call 911.      Vitamin D3 (CHOLECALCIFEROL) 25 mcg (1000 units) tablet Take 2 tablets by mouth daily              DISCHARGE DISPOSITION: Death     DISCHARGE INSTRUCTIONS:  No discharge procedures on file.    It was our pleasure to care for Tyler Renteria  during this hospitalization. Please do not hesitate to contact me should there be questions regarding the hospital course or discharge plan.      Patient was discussed and evaluated with Dr. Toney, Subhash Ascencio, *, attending physician, who agrees with the assessment and plan above.    Joshua Martinez MD  Cardiology Fellow

## 2024-06-19 LAB
BACTERIA BLD CULT: NO GROWTH
BACTERIA SPT CULT: ABNORMAL
GRAM STAIN RESULT: ABNORMAL
GRAM STAIN RESULT: ABNORMAL
S100 CA BINDING PROTEIN B SER-MCNC: 1087 NG/L
S100 CA BINDING PROTEIN B SER-MCNC: 1318 NG/L

## 2024-06-20 LAB
BACTERIA BLD CULT: NO GROWTH
BACTERIA SPT CULT: ABNORMAL
GRAM STAIN RESULT: ABNORMAL
GRAM STAIN RESULT: ABNORMAL

## 2024-06-25 LAB
AMPHET BLD CFM-MCNC: NEGATIVE NG/ML
APAP BLD-MCNC: NEGATIVE UG/ML
BARBITURATES SPEC-MCNC: NEGATIVE UG/ML
BENZODIAZ SPEC-MCNC: NEGATIVE NG/ML
BUPRENORPHINE SERPL-MCNC: NEGATIVE NG/ML
BZE BLD CFM-MCNC: NEGATIVE NG/ML
CARBOXYTHC BLD-MCNC: NEGATIVE NG/ML
CARISOPRODOL IA: NEGATIVE UG/ML
DECLARED MEDICATIONS: ABNORMAL
DRUGS BLD SCN NOM: ABNORMAL
DRUGS FLD: ABNORMAL
DRUGS FLD: ABNORMAL
DRUGS FLD: POSITIVE
ETHANOL BLD-MCNC: NEGATIVE GM/DL
FENTANYL IA: NEGATIVE NG/ML
GABAPENTIN IA: ABNORMAL UG/ML
MEPERIDINE SERPLBLD-MCNC: NEGATIVE NG/ML
METHADONE SAL CFM-MCNC: NEGATIVE NG/ML
OPIATES SPEC-MCNC: NEGATIVE NG/ML
OXYCODONE SERPLBLD SCN-MCNC: NEGATIVE NG/ML
PCP SPEC-MCNC: NEGATIVE NG/ML
PROPOXYPH SPEC-MCNC: NEGATIVE NG/ML
TRAMADOL BLD-MCNC: NEGATIVE NG/ML

## 2024-06-26 LAB — S100 CA BINDING PROTEIN B SER-MCNC: 3261 NG/L

## (undated) DEVICE — TUBING PRESSURE 30"

## (undated) DEVICE — INTRO SHEATH 9FRX10CM PINNACLE RSS902

## (undated) DEVICE — GLOVE BIOGEL PI MICRO INDICATOR UNDERGLOVE SZ 8.0 48980

## (undated) DEVICE — KIT HAND CONTROL ACIST 016795

## (undated) DEVICE — KIT MICROINTRODUCER VASCULAR  4FRX21GAX4CM

## (undated) DEVICE — PACK HEART LEFT CUSTOM

## (undated) DEVICE — CANNULA VENOUS 25FR LONG

## (undated) DEVICE — CATH ANGIO SUPERTORQUE IM 6FRX100CM 533660

## (undated) DEVICE — GLOVE BIOGEL PI SZ 8.0 40880

## (undated) DEVICE — CATH ANGIO SUPERTORQUE PLUS JL4 6FRX100CM 533620

## (undated) DEVICE — GW VASC .035IN DIA 260CML 7CML 3 MM RADIUS J CURVE 502455

## (undated) DEVICE — GLIDEWIRE TERUMO .035X180CM 1.5,, J-TIP GR3525

## (undated) DEVICE — WIRE GUIDE 0.035"X145CM AMPLATZ XSTIFF J THSCF-35-145-3-AES

## (undated) DEVICE — GW 0.014IN (0.01IN TAPER) X 19

## (undated) DEVICE — WIRE GUIDE 0.035"X150CM EMERALD J TIP 502521

## (undated) DEVICE — VALVE HEMOSTASIS .096" COPILOT MECH 1003331

## (undated) DEVICE — INTRODUCER SHEATH 8FRX24CM ARROW FLEX CL-07824

## (undated) DEVICE — KIT ARTERIAL LINE 2GA 12CM INTRO NDL ASK-04510-HF

## (undated) DEVICE — Device

## (undated) DEVICE — CATH ANGIO INFINITI 3DRC 6FRX100CM 534676T

## (undated) DEVICE — CANNULA ART 17FR 3/8IN 23CM 2 SH BIOLINE 70105.3082

## (undated) DEVICE — INTRO SHEATH 8FRX10CM PINNACLE RSS802

## (undated) DEVICE — MANIFOLD KIT ANGIO AUTOMATED 014613

## (undated) DEVICE — INTRO SHEATH 6FRX25CM PINNACLE RSS606

## (undated) DEVICE — INTRO SHTH INTRO SHTH 8FR X 11

## (undated) DEVICE — KIT MICROINTRODUCER VASCULAR VSI 4FRX0.018INX40CM 7195X

## (undated) DEVICE — CATH GUIDING BLUE YELLOW PTFE XB3.5 6FRX100CM 67005400

## (undated) DEVICE — GUIDEWIRE VASC 0.014INX180CM RUNTHROUGH 25-1011